# Patient Record
Sex: FEMALE | Race: WHITE | NOT HISPANIC OR LATINO | Employment: FULL TIME | URBAN - METROPOLITAN AREA
[De-identification: names, ages, dates, MRNs, and addresses within clinical notes are randomized per-mention and may not be internally consistent; named-entity substitution may affect disease eponyms.]

---

## 2017-03-24 ENCOUNTER — OFFICE VISIT (OUTPATIENT)
Dept: SLEEP MEDICINE | Facility: CLINIC | Age: 42
End: 2017-03-24
Attending: INTERNAL MEDICINE
Payer: COMMERCIAL

## 2017-03-24 VITALS
HEART RATE: 76 BPM | HEIGHT: 64 IN | DIASTOLIC BLOOD PRESSURE: 89 MMHG | BODY MASS INDEX: 28.85 KG/M2 | WEIGHT: 169 LBS | RESPIRATION RATE: 16 BRPM | SYSTOLIC BLOOD PRESSURE: 130 MMHG | OXYGEN SATURATION: 98 %

## 2017-03-24 DIAGNOSIS — J31.0 OTHER RHINITIS: ICD-10-CM

## 2017-03-24 DIAGNOSIS — F32.89 OTHER DEPRESSION: ICD-10-CM

## 2017-03-24 DIAGNOSIS — G47.50 PARASOMNIA: Primary | ICD-10-CM

## 2017-03-24 DIAGNOSIS — R06.83 SNORING: ICD-10-CM

## 2017-03-24 NOTE — PATIENT INSTRUCTIONS
actigraphy  3-4 wks after actigraphy or with sleep diaries  Your BMI is Body mass index is 29.01 kg/(m^2).  Weight management is a personal decision.  If you are interested in exploring weight loss strategies, the following discussion covers the approaches that may be successful. Body mass index (BMI) is one way to tell whether you are at a healthy weight, overweight, or obese. It measures your weight in relation to your height.  A BMI of 18.5 to 24.9 is in the healthy range. A person with a BMI of 25 to 29.9 is considered overweight, and someone with a BMI of 30 or greater is considered obese. More than two-thirds of American adults are considered overweight or obese.  Being overweight or obese increases the risk for further weight gain. Excess weight may lead to heart disease and diabetes.  Creating and following plans for healthy eating and physical activity may help you improve your health.  Weight control is part of healthy lifestyle and includes exercise, emotional health, and healthy eating habits. Careful eating habits lifelong are the mainstay of weight control. Though there are significant health benefits from weight loss, long-term weight loss with diet alone may be very difficult to achieve- studies show long-term success with dietary management in less than 10% of people. Attaining a healthy weight may be especially difficult to achieve in those with severe obesity. In some cases, medications, devices and surgical management might be considered.  What can you do?  If you are overweight or obese and are interested in methods for weight loss, you should discuss this with your provider.     Consider reducing daily calorie intake by 500 calories.     Keep a food journal.     Avoiding skipping meals, consider cutting portions instead.    Diet combined with exercise helps maintain muscle while optimizing fat loss. Strength training is particularly important for building and maintaining muscle mass. Exercise  helps reduce stress, increase energy, and improves fitness. Increasing exercise without diet control, however, may not burn enough calories to loose weight.       Start walking three days a week 10-20 minutes at a time    Work towards walking thirty minutes five days a week     Eventually, increase the speed of your walking for 1-2 minutes at time    In addition, we recommend that you review healthy lifestyles and methods for weight loss available through the National Institutes of Health patient information sites:  http://win.niddk.nih.gov/publications/index.htm    And look into health and wellness programs that may be available through your health insurance provider, employer, local community center, or gifty club.    Weight management plan: Patient was referred to their PCP to discuss a diet and exercise plan.

## 2017-03-24 NOTE — PROGRESS NOTES
Allergies:    Allergies   Allergen Reactions     Phenergan Vc [Promethazine Vc] Visual Disturbance       Medications:    Current Outpatient Prescriptions   Medication Sig Dispense Refill     rizatriptan (MAXALT-MLT) 5 MG disintegrating tablet Take 1-2 tablets (5-10 mg) by mouth at onset of headache for migraine May repeat dose in 2 hours.  Do not exceed 30 mg in 24 hours 10 tablet 1     norgestimate-ethinyl estradiol (ORTHO-CYCLEN, SPRINTEC) 0.25-35 MG-MCG per tablet Take 1 tablet by mouth daily 84 tablet 3     norethindrone (MICRONOR) 0.35 MG per tablet Take 1 tablet (0.35 mg) by mouth daily 84 tablet 3     ketoconazole (NIZORAL) 2 % shampoo Apply topically to scalp, lather, leave on for 3-5 minutes, then rinse.  Use ever other day 120 mL 12     loratadine (CLARITIN) 10 MG tablet Take 10 mg by mouth daily       Fluvoxamine Maleate 100 MG CP24 Take 200 mg by mouth daily.       amphetamine-dextroamphetamine (ADDERALL) 30 MG tablet Take 30 mg by mouth daily.       buPROPion (WELLBUTRIN XL) 150 MG 24 hr tablet Take 150 mg by mouth every morning.       CALCIUM PO Take  by mouth.       Multiple Vitamins-Minerals (MULTIPLE VITAMINS/WOMENS PO) Take  by mouth.       COD LIVER OIL PO Take  by mouth.       Probiotic Product (PROBIOTIC PO) Take  by mouth.         Problem List:  Patient Active Problem List    Diagnosis Date Noted     Hair loss 01/08/2015     Priority: Medium     Contraception -- abstinence 09/04/2013     Priority: Medium     Nexplanon info given.       Acne 01/08/2015     Cystic. RX spironolactone 1/8/15: start 25 mg x 7day, then 50 mg daily       Plantar fascial fibromatosis 11/10/2014     Diaphoresis 09/04/2013     Unknown cause.       Depression 02/08/2012     Controlled on meds followed by Psych  Problem list name updated by automated process. Provider to review and confirm       OCD (obsessive compulsive disorder) 02/08/2012     Controlled on meds w Psychiatrist Tanisha Becker       IBS (irritable bowel  "syndrome) 02/08/2012     Diarrhea-type       Migraines 02/08/2012     Exacerbated with menses. Currently 1-2 per month, lasting 3-4 days. Does have aura.       Hyperlipidemia 02/08/2012        Past Medical/Surgical History:  Past Medical History:   Diagnosis Date     Abnormal Pap smear 2006?    dysplasia  X 1; paps OK since then...     Arm DVT (deep venous thromboembolism), acute (H) 2008    hx with phlebitis in IVs after a surgery     Cholesterol serum elevated     runs in family     Complication of anesthesia 2000?, 2016    very slow to awake from both gen. anesth. & conscious sedati     Depression      Encounter for insertion of mirena IUD 2011    D/C'd w increased acne     IBS (irritable bowel syndrome) 2002    under control, better with probiotic     Menarche age 15    cycles q 1-2 months Xs 3-4 d w bad cramps     Migraines 2008    a lot better now, may be stress related     OCD (obsessive compulsive disorder)      Seasonal allergies      Past Surgical History:   Procedure Laterality Date     HEAD & NECK SURGERY  1990?, 2016    wisdom teeth extracted, gum tissue grafting/oral surgery     LASIK Bilateral 2008     MAMMOPLASTY REDUCTION BILATERAL  1197           Physical Examination:  Vitals: /89 (BP Location: Right arm, Patient Position: Supine, Cuff Size: Adult Regular)  Pulse 76  Resp 16  Ht 1.626 m (5' 4\")  Wt 76.7 kg (169 lb)  SpO2 98%  BMI 29.01 kg/m2  BMI= Body mass index is 29.01 kg/(m^2).         [place] Total Score 3/24/2017   Total score - [place] 12       GENERAL APPEARANCE: healthy, alert and mild distress  EYES: Eyes grossly normal to inspection  HENT: oropharynx crowded, soft palate dependent and redundant tissue, nares mildly congested  NECK: thyroid normal to palpation  RESP: lungs clear to auscultation - no rales, rhonchi or wheezes  CV: regular rates and rhythm, normal S1 S2, no S3 or S4 and no murmur, click or rub  Musculoskeletal:  Mallampati Class: III.  Tonsillar Stage: 1  hidden " by pillars.  Dental: Dentition in good condition.  Good advancement of lower jaw without tmd  Skin: without facial rash        CC: Jazzy Llamas

## 2017-03-24 NOTE — MR AVS SNAPSHOT
After Visit Summary   3/24/2017    Mireille Walls    MRN: [id number]           Patient Information     Date Of Birth          1975        Visit Information        Provider Department      3/24/2017 11:00 AM Brenna Avelar APRN [facility], [place], Sleep Study        Today's Diagnoses     Parasomnia    -  1    Chronic fatigue          Care Instructions      Your BMI is Body mass index is 29.01 kg/(m^2).  Weight management is a personal decision.  If you are interested in exploring weight loss strategies, the following discussion covers the approaches that may be successful. Body mass index (BMI) is one way to tell whether you are at a healthy weight, overweight, or obese. It measures your weight in relation to your height.  A BMI of 18.5 to 24.9 is in the healthy range. A person with a BMI of 25 to 29.9 is considered overweight, and someone with a BMI of 30 or greater is considered obese. More than two-thirds of American adults are considered overweight or obese.  Being overweight or obese increases the risk for further weight gain. Excess weight may lead to heart disease and diabetes.  Creating and following plans for healthy eating and physical activity may help you improve your health.  Weight control is part of healthy lifestyle and includes exercise, emotional health, and healthy eating habits. Careful eating habits lifelong are the mainstay of weight control. Though there are significant health benefits from weight loss, long-term weight loss with diet alone may be very difficult to achieve- studies show long-term success with dietary management in less than 10% of people. Attaining a healthy weight may be especially difficult to achieve in those with severe obesity. In some cases, medications, devices and surgical management might be considered.  What can you do?  If you are overweight or obese and are interested in methods for weight loss, you should discuss this with your provider.      Consider reducing daily calorie intake by 500 calories.     Keep a food journal.     Avoiding skipping meals, consider cutting portions instead.    Diet combined with exercise helps maintain muscle while optimizing fat loss. Strength training is particularly important for building and maintaining muscle mass. Exercise helps reduce stress, increase energy, and improves fitness. Increasing exercise without diet control, however, may not burn enough calories to loose weight.       Start walking three days a week 10-20 minutes at a time    Work towards walking thirty minutes five days a week     Eventually, increase the speed of your walking for 1-2 minutes at time    In addition, we recommend that you review healthy lifestyles and methods for weight loss available through the National Institutes of Health patient information sites:  http://win.niddk.nih.gov/publications/index.htm    And look into health and wellness programs that may be available through your health insurance provider, employer, local community center, or gifty club.    Weight management plan: Patient was referred to their PCP to discuss a diet and exercise plan.          Follow-ups after your visit        Follow-up notes from your care team     Return for actigraphy-1st available anywhere.      Future tests that were ordered for you today     Open Future Orders        Priority Expected Expires Ordered    Comprehensive Sleep Study Routine  9/20/2017 3/24/2017            Who to contact     If you have questions or need follow up information about today's clinic visit or your schedule [facility] directly at [phone].  Normal or non-critical lab and imaging results will be communicated to you by MyChart, letter or phone within 4 business days after the clinic has received the results. If you do not hear from us within 7 days, please contact the clinic through MyChart or phone. If you have a critical or abnormal lab  "result, we will notify you by phone as soon as possible.  Submit refill requests through [facility] or call your pharmacy and they will forward the refill request to us. Please allow 3 business days for your refill to be completed.          Additional Information About Your Visit        [facility] and [facility] Information     [facility] gives you secure access to your electronic health record. If you see a primary care provider, you can also send messages to your care team and make appointments. If you have questions, please call your primary care clinic.  If you do not have a primary care provider, please call [phone] and they will assist you.        Care EveryWhere ID     This is your Care EveryWhere ID. This could be used by other organizations to access your [facility] medical records  [id number]        Your Vitals Were     Pulse Respirations Height Pulse Oximetry BMI (Body Mass Index)       76 16 1.626 m (5' 4\") 98% 29.01 kg/m2        Blood Pressure from Last 3 Encounters:   03/24/17 130/89   09/16/16 124/86   09/09/16 (!) 131/91    Weight from Last 3 Encounters:   03/24/17 76.7 kg (169 lb)   09/16/16 73.4 kg (161 lb 14.4 oz)   09/09/16 73.9 kg (162 lb 14.4 oz)              We Performed the Following     SLEEP EVALUATION & MANAGEMENT REFERRAL - ADULT        Primary Care Provider    No Ref-Primary Verified       No address on file        Thank you!     Thank you for choosing [facility]  for your care. Our goal is always to provide you with excellent care. Hearing back from our patients is one way we can continue to improve our services. Please take a few minutes to complete the written survey that you may receive in the mail after your visit with us. Thank you!             Your Updated Medication List - Protect others around you: Learn how to safely use, store and throw away your medicines at www.disposemymeds.org.          This list is accurate as of: 3/24/17 12:26 PM.  Always use your most recent med list.    "                Brand Name Dispense Instructions for use    ADDERALL 30 MG per tablet   Generic drug:  amphetamine-dextroamphetamine      Take 30 mg by mouth daily.       CALCIUM PO      Take  by mouth.       CLARITIN 10 MG tablet   Generic drug:  loratadine      Take 10 mg by mouth daily       COD LIVER OIL PO      Take  by mouth.       fluvoxaMINE Maleate 100 MG 24 hr capsule    LUVOX CR     Take 200 mg by mouth daily.       ketoconazole 2 % shampoo    NIZORAL    120 mL    Apply topically to scalp, lather, leave on for 3-5 minutes, then rinse.  Use ever other day       MULTIPLE VITAMINS/WOMENS PO      Take  by mouth.       norethindrone 0.35 MG per tablet    MICRONOR    84 tablet    Take 1 tablet (0.35 mg) by mouth daily       norgestimate-ethinyl estradiol 0.25-35 MG-MCG per tablet    ORTHO-CYCLEN, SPRINTEC    84 tablet    Take 1 tablet by mouth daily       PROBIOTIC PO      Take  by mouth.       rizatriptan 5 MG ODT tab    MAXALT-MLT    10 tablet    Take 1-2 tablets (5-10 mg) by mouth at onset of headache for migraine May repeat dose in 2 hours.  Do not exceed 30 mg in 24 hours       WELLBUTRIN  MG 24 hr tablet   Generic drug:  buPROPion      Take 150 mg by mouth every morning.

## 2017-03-24 NOTE — NURSING NOTE
"Chief Complaint   Patient presents with     Consult     Discuss chronic fatigue, talking and yelling in sleep       Initial Ht 1.626 m (5' 4\")  Wt 76.7 kg (169 lb)  BMI 29.01 kg/m2 Estimated body mass index is 29.01 kg/(m^2) as calculated from the following:    Height as of this encounter: 1.626 m (5' 4\").    Weight as of this encounter: 76.7 kg (169 lb).  Medication Reconciliation: complete       Neck circumference: 14 inches.  35 cm      SHAE Styles        "

## 2017-03-25 NOTE — PROGRESS NOTES
DATE OF VISIT:  03/24/2017       CHIEF COMPLAINT:  Dr. Llamas has requested Ms. Mireille Walls to be seen in consultation for difficulties with sleep.      HISTORY OF PRESENT ILLNESS:  The patient reports continuing persistent fatigue associated with talking and yelling in her sleep.  She had a previous sleep study done at [facility] approximately 15 years ago without recommendations, was diagnosed with idiopathic hypersomnia.  Currently some reports from relatives that there may be some light snoring that occurs.  It is rare for her to wake up with a snort arousal.  Also reports a dry throat and trouble breathing through her nose.  There have been no witnessed apneas.  When she does have arousals from sleep she notices that she has been on her back.  She also sleeps on her sides.  No signs of orexin deficiency.  Behaviors at night can occur 1-2 times a week and they include talking, screaming, cussing, motioning, flailing about it.  If she has gone to bed at 1:00, 2:00 or 3:00 in the morning, they can occur somewhere between 3:00 and 5:00 a.m.  She sometimes will awaken after having heard perhaps a loud scream.  Otherwise, unless she is sleeping at her mother's, she is not aware of the frequency of these events.  She feels there has been no intentional re-creation of activities during a dream.  She flails.  She has kicked the wall and if she flails and connects with something, that may cause a wakeup. She is difficult to arouse when sleeping at her mother's after a screaming or flailing event.      She has been working for 6 years doing the p.m. shift, starting work, I believe somewhere around 3:00 and working until 11:00 or 12:00, then getting home and enters bed somewhere between 1:00 a.m. or 2:30 in the morning.  If she works the next day she exits bed at 11:00 a.m.  If she does not work her sleep schedule shows bedtime somewhere around 9:00 p.m. and she will have a wakeup around 11:00 a.m., take  her medications, go back to sleep and stay in bed until around 2:00 p.m.  Her staying asleep and spending a lot of time in bed is related to her depression and her OCD diagnosis and she is between providers for these concerns.  She will feel as if she just has not gotten enough sleep or quality of sleep.      Sleep latency 20-30 minutes.  One bathroom break.  It takes 5 minutes to fall back asleep with a wakeup.  On work nights she may get 10 hours, on weekends or days off ,17-22 hours.  There is some uncertainty as to how long she has been asleep.  Four to 5 days a week she may take what she considers to be a nap.  She may take a brief nap before she goes into work or on days off, her naps may be 3-5 hours, that is included in her total sleep time previously mentioned.  No activities in bed such as TV or screens.      SOCIAL AND PERSONAL HISTORY:  She is single.  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      SLEEP ENVIRONMENT:  Conducive to good sleep.  She denies any noise that may be triggers.        Family members have been diagnosed with ALIVIA; however, her brother is obese.  No use of alcohol, marijuana or over-the-counter sleep aids.  Does have some minimal caffeine.      [place] Sleepiness score today is 12/24 without difficulties with driving.  Sleepiness does affect her work, has had problems with attendance, she has used FMLA and currently her FMLA is not available to her any longer.  Thirty out of 30 days, problems with OCD and depression.  She is encouraged to get a new provider.  At this point, she feels provider is in the throes of jail.  Thirty out of 30 days tired and fatigued.        REVIEW OF SYSTEMS:  A 14-point comprehensive review of systems was completed and negative with the exception of the following:  She has allergies to dust, it is year around.  Takes Claritin.  Does also get some congestion if her Claritin has not  been taken where her eyes start running.        MENTAL HEALTH:  Depression, anxiety and OCD.      Allergies:    Allergies   Allergen Reactions     Phenergan Vc [Promethazine Vc] Visual Disturbance       Medications:    Current Outpatient Prescriptions   Medication Sig Dispense Refill     rizatriptan (MAXALT-MLT) 5 MG disintegrating tablet Take 1-2 tablets (5-10 mg) by mouth at onset of headache for migraine May repeat dose in 2 hours.  Do not exceed 30 mg in 24 hours 10 tablet 1     norgestimate-ethinyl estradiol (ORTHO-CYCLEN, SPRINTEC) 0.25-35 MG-MCG per tablet Take 1 tablet by mouth daily 84 tablet 3     norethindrone (MICRONOR) 0.35 MG per tablet Take 1 tablet (0.35 mg) by mouth daily 84 tablet 3     ketoconazole (NIZORAL) 2 % shampoo Apply topically to scalp, lather, leave on for 3-5 minutes, then rinse.  Use ever other day 120 mL 12     loratadine (CLARITIN) 10 MG tablet Take 10 mg by mouth daily       Fluvoxamine Maleate 100 MG CP24 Take 200 mg by mouth daily.       amphetamine-dextroamphetamine (ADDERALL) 30 MG tablet Take 30 mg by mouth daily.       buPROPion (WELLBUTRIN XL) 150 MG 24 hr tablet Take 150 mg by mouth every morning.       CALCIUM PO Take  by mouth.       Multiple Vitamins-Minerals (MULTIPLE VITAMINS/WOMENS PO) Take  by mouth.       COD LIVER OIL PO Take  by mouth.       Probiotic Product (PROBIOTIC PO) Take  by mouth.         Problem List:  Patient Active Problem List    Diagnosis Date Noted     Hair loss 01/08/2015     Priority: Medium     Contraception -- abstinence 09/04/2013     Priority: Medium     Nexplanon info given.       Acne 01/08/2015     Cystic. RX spironolactone 1/8/15: start 25 mg x 7day, then 50 mg daily       Plantar fascial fibromatosis 11/10/2014     Diaphoresis 09/04/2013     Unknown cause.       Depression 02/08/2012     Controlled on meds followed by Psych  Problem list name updated by automated process. Provider to review and confirm       OCD (obsessive compulsive  "disorder) 02/08/2012     Controlled on meds w Psychiatrist Tanisha Becker       IBS (irritable bowel syndrome) 02/08/2012     Diarrhea-type       Migraines 02/08/2012     Exacerbated with menses. Currently 1-2 per month, lasting 3-4 days. Does have aura.       Hyperlipidemia 02/08/2012        Past Medical/Surgical History:  Past Medical History:   Diagnosis Date     Abnormal Pap smear 2006?    dysplasia  X 1; paps OK since then...     Arm DVT (deep venous thromboembolism), acute (H) 2008    hx with phlebitis in IVs after a surgery     Cholesterol serum elevated     runs in family     Complication of anesthesia 2000?, 2016    very slow to awake from both gen. anesth. & conscious sedati     Depression      Encounter for insertion of mirena IUD 2011    D/C'd w increased acne     IBS (irritable bowel syndrome) 2002    under control, better with probiotic     Menarche age 15    cycles q 1-2 months Xs 3-4 d w bad cramps     Migraines 2008    a lot better now, may be stress related     OCD (obsessive compulsive disorder)      Seasonal allergies      Past Surgical History:   Procedure Laterality Date     HEAD & NECK SURGERY  1990?, 2016    wisdom teeth extracted, gum tissue grafting/oral surgery     LASIK Bilateral 2008     MAMMOPLASTY REDUCTION BILATERAL  1197           Physical Examination:  Vitals: /89 (BP Location: Right arm, Patient Position: Supine, Cuff Size: Adult Regular)  Pulse 76  Resp 16  Ht 1.626 m (5' 4\")  Wt 76.7 kg (169 lb)  SpO2 98%  BMI 29.01 kg/m2  BMI= Body mass index is 29.01 kg/(m^2).         [place] Total Score 3/24/2017   Total score - [place] 12       GENERAL APPEARANCE: healthy, alert and mild distress  EYES: Eyes grossly normal to inspection  HENT: oropharynx crowded, soft palate dependent and redundant tissue, nares mildly congested  NECK: thyroid normal to palpation  RESP: lungs clear to auscultation - no rales, rhonchi or wheezes  CV: regular rates and rhythm, normal S1 S2, no S3 " or S4 and no murmur, click or rub  Musculoskeletal:  Mallampati Class: III.  Tonsillar Stage: 1  hidden by pillars.  Dental: Dentition in good condition.  Good advancement of lower jaw without tmd  Skin: without facial rash    ASSESSMENT AND PLAN:  Ms. De La Pazs STOP-Bang score is likely, at her age, is 2 giving her no to an exceptionally low probability of obstructive sleep apnea; however, her sleep is disturbed by arousals of which she does not usually come to full consciousness unless she has hurt herself by thrashing and hitting her surroundings and sometimes there may be a vague dream of protecting relatives.  They occur in the early part of the evening and of interest the start of these behaviors (with our review today) an awareness that they have come upon her as she shifted her work from daytime to working evenings and sleeping into the daytime hours.  When coupled with her depression and anxiety and feeling that she cannot get enough sleep, she is spending most of her time in bed.The following plan of care has been developed:     1.  Parasomnias.  They do sound to be more of a night terror than confusional arousals with no evidence of leaving her bed with minimal signs of injury and nothing displaced in her home.  Will use actigraphy or sleep logs to identify patterns and frequency of events. She is willing to drive to any center to  actigraphy. To that end, we also discussed the possibility of going back to working day shift and she seems interested in that and will make some inquiry as to what that may look like considering the fact that she fully enjoys her role in her work.  She has MyChart and will get in touch with me if she is able to be provided actigraphy and we will keep in touch as she pursues gathering information about changing to day shift. Followup will be based on actigraphy availability.  Our plan is being seen again roughly in 3-4 weeks.   2.  Snoring and sometimes with daytime  congestion, I encouraged her to continue with her Claritin and treat some rhinitis.   3  Rhinitis.  I have demonstrated the use of Flonase.     4.  Depression with obsessive compulsive disorder.  She needs to be seen regularly so that we can add degree of activity to her daytime function when she is not working.      Thank you for allowing me to participate in this kind woman's care.  I will see her roughly in 1 month's time, sooner if needed.  We will pursue actigraphy; however, with the association of her change in shifts of sleep, it does sound like this is a case of misalignment of her circadian rhythm resulting in parasomnias.  Time spent with patient 60 minutes, of which greater than 50% was spent in counseling, education and coordination of care.         MINNIE PAVON, CNP             D: 2017 08:45   T: 2017 12:28   MT: sujatha      Name:     XI LYLES   MRN:      [id number]        Account:      [id number]   :      1975           Visit Date:   2017      Document: [id number]       cc: Jazzy Llamas MD

## 2017-04-11 ENCOUNTER — TELEPHONE (OUTPATIENT)
Dept: SLEEP MEDICINE | Facility: CLINIC | Age: 42
End: 2017-04-11

## 2017-04-11 NOTE — TELEPHONE ENCOUNTER
Record information rev'd from [facility] [place] stating that there are no records on pt's previous sleep study.  Sleep study done 10 years ago.    SHAE Styles

## 2017-04-26 DIAGNOSIS — G43.009 MIGRAINE WITHOUT AURA AND WITHOUT STATUS MIGRAINOSUS, NOT INTRACTABLE: ICD-10-CM

## 2017-04-26 RX ORDER — RIZATRIPTAN BENZOATE 5 MG/1
5-10 TABLET, ORALLY DISINTEGRATING ORAL
Qty: 10 TABLET | Refills: 1 | Status: SHIPPED | OUTPATIENT
Start: 2017-04-26 | End: 2017-12-04

## 2017-07-06 DIAGNOSIS — Z30.41 SURVEILLANCE OF PREVIOUSLY PRESCRIBED CONTRACEPTIVE PILL: Primary | ICD-10-CM

## 2017-07-06 RX ORDER — NORGESTIMATE AND ETHINYL ESTRADIOL 0.25-0.035
1 KIT ORAL DAILY
Qty: 30 TABLET | Refills: 0 | Status: SHIPPED | OUTPATIENT
Start: 2017-07-06 | End: 2017-07-28

## 2017-07-06 NOTE — TELEPHONE ENCOUNTER
Received refill request for OCP.  Last in clinic 6/2016.    Tried to reach Mireille but received personal voicemail.  Left message that one month refill can be sent to pharmacy but annual exam is due with midwives. Please call to schedule.

## 2017-07-13 ENCOUNTER — TELEPHONE (OUTPATIENT)
Dept: OBGYN | Facility: CLINIC | Age: 42
End: 2017-07-13

## 2017-07-13 DIAGNOSIS — Z12.31 ENCOUNTER FOR SCREENING MAMMOGRAM FOR BREAST CANCER: Primary | ICD-10-CM

## 2017-07-13 NOTE — TELEPHONE ENCOUNTER
Spoke with Mireille who is having mammogram tomorrow. In the past she has had regular mammograms but due to history of breast reduction, scar tissue shows up and then they need to do the 3D mammogram. So this year she scheduled the 3D one right away. She checked with her insurance and they state that it will be covered if there is a standing order for the 3D mammogram from her MD. Spoke with Dr. Tilley and she agreed to order. Will pend to her to sign.

## 2017-07-19 ENCOUNTER — TELEPHONE (OUTPATIENT)
Dept: SLEEP MEDICINE | Facility: CLINIC | Age: 42
End: 2017-07-19

## 2017-07-20 ENCOUNTER — TELEPHONE (OUTPATIENT)
Dept: SLEEP MEDICINE | Facility: CLINIC | Age: 42
End: 2017-07-20

## 2017-07-21 ENCOUNTER — RADIANT APPOINTMENT (OUTPATIENT)
Dept: MAMMOGRAPHY | Facility: CLINIC | Age: 42
End: 2017-07-21

## 2017-07-21 DIAGNOSIS — Z12.31 ENCOUNTER FOR SCREENING MAMMOGRAM FOR BREAST CANCER: ICD-10-CM

## 2017-07-27 ASSESSMENT — ENCOUNTER SYMPTOMS
LOSS OF CONSCIOUSNESS: 0
DIZZINESS: 1
DECREASED LIBIDO: 0
INSOMNIA: 0
DISTURBANCES IN COORDINATION: 0
HEADACHES: 1
DEPRESSION: 1
SEIZURES: 0
DECREASED CONCENTRATION: 0
NERVOUS/ANXIOUS: 1
MEMORY LOSS: 0
SPEECH CHANGE: 0
PARALYSIS: 0
TREMORS: 0
NUMBNESS: 0
WEAKNESS: 0
HOT FLASHES: 0
TINGLING: 0
PANIC: 0

## 2017-07-27 ASSESSMENT — ANXIETY QUESTIONNAIRES
7. FEELING AFRAID AS IF SOMETHING AWFUL MIGHT HAPPEN: NOT AT ALL
GAD7 TOTAL SCORE: 3
7. FEELING AFRAID AS IF SOMETHING AWFUL MIGHT HAPPEN: NOT AT ALL
4. TROUBLE RELAXING: SEVERAL DAYS
GAD7 TOTAL SCORE: 3
6. BECOMING EASILY ANNOYED OR IRRITABLE: NOT AT ALL
3. WORRYING TOO MUCH ABOUT DIFFERENT THINGS: NOT AT ALL
1. FEELING NERVOUS, ANXIOUS, OR ON EDGE: MORE THAN HALF THE DAYS
5. BEING SO RESTLESS THAT IT IS HARD TO SIT STILL: NOT AT ALL
2. NOT BEING ABLE TO STOP OR CONTROL WORRYING: NOT AT ALL
GAD7 TOTAL SCORE: 3

## 2017-07-27 ASSESSMENT — PATIENT HEALTH QUESTIONNAIRE - PHQ9
SUM OF ALL RESPONSES TO PHQ QUESTIONS 1-9: 11
10. IF YOU CHECKED OFF ANY PROBLEMS, HOW DIFFICULT HAVE THESE PROBLEMS MADE IT FOR YOU TO DO YOUR WORK, TAKE CARE OF THINGS AT HOME, OR GET ALONG WITH OTHER PEOPLE: EXTREMELY DIFFICULT
SUM OF ALL RESPONSES TO PHQ QUESTIONS 1-9: 11

## 2017-07-28 DIAGNOSIS — Z30.41 SURVEILLANCE OF PREVIOUSLY PRESCRIBED CONTRACEPTIVE PILL: ICD-10-CM

## 2017-07-28 RX ORDER — NORGESTIMATE AND ETHINYL ESTRADIOL 0.25-0.035
1 KIT ORAL DAILY
Qty: 30 TABLET | Refills: 0 | Status: SHIPPED | OUTPATIENT
Start: 2017-07-28 | End: 2017-08-08

## 2017-07-28 ASSESSMENT — PATIENT HEALTH QUESTIONNAIRE - PHQ9: SUM OF ALL RESPONSES TO PHQ QUESTIONS 1-9: 11

## 2017-07-28 ASSESSMENT — ANXIETY QUESTIONNAIRES: GAD7 TOTAL SCORE: 3

## 2017-08-08 ENCOUNTER — OFFICE VISIT (OUTPATIENT)
Dept: FAMILY MEDICINE | Facility: CLINIC | Age: 42
End: 2017-08-08
Attending: FAMILY MEDICINE
Payer: COMMERCIAL

## 2017-08-08 VITALS
DIASTOLIC BLOOD PRESSURE: 86 MMHG | HEIGHT: 64 IN | SYSTOLIC BLOOD PRESSURE: 138 MMHG | HEART RATE: 84 BPM | WEIGHT: 166 LBS | BODY MASS INDEX: 28.34 KG/M2

## 2017-08-08 DIAGNOSIS — F33.1 MAJOR DEPRESSIVE DISORDER, RECURRENT EPISODE, MODERATE (H): ICD-10-CM

## 2017-08-08 DIAGNOSIS — Z13.1 SCREENING FOR DIABETES MELLITUS: ICD-10-CM

## 2017-08-08 DIAGNOSIS — G47.10 HYPERSOMNIA: ICD-10-CM

## 2017-08-08 DIAGNOSIS — Z13.29 SCREENING FOR THYROID DISORDER: ICD-10-CM

## 2017-08-08 DIAGNOSIS — Z30.41 SURVEILLANCE OF PREVIOUSLY PRESCRIBED CONTRACEPTIVE PILL: ICD-10-CM

## 2017-08-08 DIAGNOSIS — Z00.00 ANNUAL PHYSICAL EXAM: Primary | ICD-10-CM

## 2017-08-08 DIAGNOSIS — E78.1 HYPERTRIGLYCERIDEMIA: ICD-10-CM

## 2017-08-08 PROBLEM — F41.8 ANXIETY ASSOCIATED WITH DEPRESSION: Status: ACTIVE | Noted: 2017-08-08

## 2017-08-08 PROCEDURE — 99213 OFFICE O/P EST LOW 20 MIN: CPT | Mod: ZF

## 2017-08-08 RX ORDER — NORGESTIMATE AND ETHINYL ESTRADIOL 0.25-0.035
1 KIT ORAL DAILY
Qty: 90 TABLET | Refills: 0 | Status: SHIPPED | OUTPATIENT
Start: 2017-08-08 | End: 2017-10-17

## 2017-08-08 ASSESSMENT — PAIN SCALES - GENERAL: PAINLEVEL: NO PAIN (0)

## 2017-08-08 NOTE — LETTER
2017       RE: Mireille Walls  [address] [place] [address]     Dear Colleague,    Thank you for referring your patient, Mireille Walls, to [facility] at [facility]. Please see a copy of my visit note below.    Mireille is a 42 year old female  that presents today for annual exam:   HPI:  Hypersomnia and difficulty with sleep:    Works evenings and gets home at midnight    Sleep late into the morning and no energy [In bed with cat is the best place].     Sleep specialist  Depression, longing, chronic/OCD    Luvox 200 mg     Wellbutrin 150     Adderall 30 mg daily   [medications from psychiatrist - Dr. Tanisha Becker]. Has a consult with [facility] in September.   HCM: mammogram/tomosynthesis: 2017; Pap/HPV 2016;    ROS:  General: long standing depression   Head/Eyes: none  Ears/Nose/Throat: none  Cardiovascular: none  Respiratory: none  Sexual Function: none  Musculoskeletal: body is sensitive   Skin: none  Neurological: none  Mental Health: none  Endocrine: none  PROBLEM LIST:  Patient Active Problem List   Diagnosis     Depression     OCD (obsessive compulsive disorder)     IBS (irritable bowel syndrome)     Migraines     Hyperlipidemia     Contraception -- abstinence     Diaphoresis     Plantar fascial fibromatosis     Acne     Hair loss   OB/GYN HISTORY:   Menses: OCP's continuously. Spotting off and on.   Obstetric History       T0      L0     SAB0   TAB0   Ectopic0   Multiple0   Live Births0       # Outcome Date GA Lbr Jeremiah/2nd Weight Sex Delivery Anes PTL Lv   1 AB               PAST MEDICAL HISTORY:  Past Medical History:   Diagnosis Date     Abnormal Pap smear ?    dysplasia  X 1; paps OK since then...     Arm DVT (deep venous thromboembolism), acute (H)     hx with phlebitis in IVs after a surgery     Cholesterol serum elevated     runs in family     Complication of anesthesia ?, 2016    very  slow to awake from both gen. anesth. & conscious sedati     Depression      Encounter for insertion of mirena IUD 2011    D/C'd w increased acne     IBS (irritable bowel syndrome) 2002    under control, better with probiotic     Menarche age 15    cycles q 1-2 months Xs 3-4 d w bad cramps     Migraines 2008    a lot better now, may be stress related     OCD (obsessive compulsive disorder)      Seasonal allergies    Life Style Modifiers:   Tobacco:  reports that she has never smoked. She has never used smokeless tobacco.   Alcohol:  reports that she does not drink alcohol.   Drug use:  reports that she does not use illicit drugs.  Exercise: limited because of fatigue. Very active at work.                    PAST SURGICAL HISTORY:  Past Surgical History:   Procedure Laterality Date     HEAD & NECK SURGERY  1990?, 2016    wisdom teeth extracted, gum tissue grafting/oral surgery     LASIK Bilateral 2008     MAMMOPLASTY REDUCTION BILATERAL  1197   FAMILY HISTORY:  Family History   Problem Relation Age of Onset     CANCER Father 57     bladder ca     Genitourinary Problems Father      Polycystic kidney     Cancer - colorectal Paternal Grandmother 75     MENTAL ILLNESS Other      depression, schizophrenia     CANCER Mother      skin cancer     Hyperlipidemia Mother      runs in Mom's "[employer].-fam. members meds high choleste     Alcohol/Drug Paternal Aunt      Xs 2     Thyroid Disease Maternal Grandmother      hypothyroid     Coronary Artery Disease Maternal Grandfather      heart attack     MENTAL ILLNESS Other      Schizophrenia     MENTAL ILLNESS Cousin      OCD     Depression Other      chronic and episodic     C.A.D. No family hx of      Hypertension No family hx of      Breast Cancer No family hx of    SOCIAL HISTORY:  Single. Loves animals.  Mother alive and lives in [place].   MEDICATIONS:  Current Outpatient Prescriptions   Medication Sig Dispense Refill     norgestimate-ethinyl estradiol (ORTHO-CYCLEN, SPRINTEC)  "0.25-35 MG-MCG per tablet Take 1 tablet by mouth daily 30 tablet 0     rizatriptan (MAXALT-MLT) 5 MG ODT tab Take 1-2 tablets (5-10 mg) by mouth at onset of headache for migraine May repeat dose in 2 hours.  Do not exceed 30 mg in 24 hours 10 tablet 1     norethindrone (MICRONOR) 0.35 MG per tablet Take 1 tablet (0.35 mg) by mouth daily 84 tablet 3     ketoconazole (NIZORAL) 2 % shampoo Apply topically to scalp, lather, leave on for 3-5 minutes, then rinse.  Use ever other day 120 mL 12     loratadine (CLARITIN) 10 MG tablet Take 10 mg by mouth daily       Fluvoxamine Maleate 100 MG CP24 Take 200 mg by mouth daily.       amphetamine-dextroamphetamine (ADDERALL) 30 MG tablet Take 30 mg by mouth daily.       buPROPion (WELLBUTRIN XL) 150 MG 24 hr tablet Take 150 mg by mouth every morning.       CALCIUM PO Take  by mouth.       Multiple Vitamins-Minerals (MULTIPLE VITAMINS/WOMENS PO) Take  by mouth.       COD LIVER OIL PO Take  by mouth.       Probiotic Product (PROBIOTIC PO) Take  by mouth.     ALLERGIES:  Phenergan vc [promethazine vc]  VITALS:  /86  Pulse 84  Ht 1.626 m (5' 4\")  Wt 75.3 kg (166 lb)  BMI 28.49 kg/m2  PHYSICAL EXAM:  Constitutional: Well appearing woman in no acute distress.   Psychological: appropriate mood.  Eyes: anicteric, normal extra-ocular movements,  pupils are equal and reactive to light.   Ears, Nose and Throat: tympanic membranes clear, nose clear and free of lesions, throat clear, moist mucous membrames, neck supple with full range of motion.    Neck: No thyroidmegaly. No jugular venous distension, no carotid bruits.  Cardiovascular: regular rate and rhythm, normal S1 and S2, no murmurs, rubs or gallops, peripheral pulses full and symmetric   Respiratory: clear to auscultation, no wheezes or crackles, normal breath sounds.  Breast: Symmetrical without visible distortion or swelling. No masses noted. No nipple inversion, no breast dimpling or puckering. Axillary area without " masses or lympadenapathy.   Gastrointestinal: positive bowel sounds, nontender, no hepatosplenomegaly, no masses. No guarding or rebound.  Genitourinary: N/A   Musculoskeletal: full range of motion    Skin: no concerning lesions, no jaundice.  Neurological: normal gait, no tremor.   Diagnoses and associated orders for this visit:  Annual physical exam  - Vitamin D every other day and Cod liver oil daily  - Mammogram up to date  - PAP/HPV completed 6/2016  Surveillance of previously prescribed contraceptive pill  -     norgestimate-ethinyl estradiol (ORTHO-CYCLEN, SPRINTEC) 0.25-35 MG-MCG per tablet; Take 1 tablet by mouth daily Continuously.  Hypersomnia  -  Follow with sleep clinic  Major depressive disorder, recurrent episode, moderate (H)  -  Seeing psychiatry   Hypertriglyceridemia  -     Lipid Panel; Future  Screening for diabetes mellitus  -     Basic Metabolic Panel; Future  Screening for thyroid disorder  -     TSH; Future  -     T3 Free; Future  -     T4 free; Future    Again, thank you for allowing me to participate in the care of your patient.      Sincerely,    Krupa Tilley MD

## 2017-08-08 NOTE — PROGRESS NOTES
Mireille is a 42 year old female  that presents today for annual exam:   HPI:  Hypersomnia and difficulty with sleep:    Works evenings and gets home at midnight    Sleep late into the morning and no energy [In bed with cat is the best place].     Sleep specialist  Depression, longing, chronic/OCD    Luvox 200 mg     Wellbutrin 150     Adderall 30 mg daily   [medications from psychiatrist - Dr. Tanisha Becker]. Has a consult with [facility] in September.   HCM: mammogram/tomosynthesis: 2017; Pap/HPV 2016;    ROS:  General: long standing depression   Head/Eyes: none  Ears/Nose/Throat: none  Cardiovascular: none  Respiratory: none  Sexual Function: none  Musculoskeletal: body is sensitive   Skin: none  Neurological: none  Mental Health: none  Endocrine: none  PROBLEM LIST:  Patient Active Problem List   Diagnosis     Depression     OCD (obsessive compulsive disorder)     IBS (irritable bowel syndrome)     Migraines     Hyperlipidemia     Contraception -- abstinence     Diaphoresis     Plantar fascial fibromatosis     Acne     Hair loss   OB/GYN HISTORY:   Menses: OCP's continuously. Spotting off and on.   Obstetric History       T0      L0     SAB0   TAB0   Ectopic0   Multiple0   Live Births0       # Outcome Date GA Lbr Jeremiah/2nd Weight Sex Delivery Anes PTL Lv   1 AB 1998              PAST MEDICAL HISTORY:  Past Medical History:   Diagnosis Date     Abnormal Pap smear ?    dysplasia  X 1; paps OK since then...     Arm DVT (deep venous thromboembolism), acute (H)     hx with phlebitis in IVs after a surgery     Cholesterol serum elevated     runs in family     Complication of anesthesia ?, 2016    very slow to awake from both gen. anesth. & conscious sedati     Depression      Encounter for insertion of mirena IUD     D/C'd w increased acne     IBS (irritable bowel syndrome)     under control, better with probiotic     Menarche age 15    cycles q 1-2 months Xs 3-4 d w bad cramps      Migraines 2008    a lot better now, may be stress related     OCD (obsessive compulsive disorder)      Seasonal allergies    Life Style Modifiers:   Tobacco:  reports that she has never smoked. She has never used smokeless tobacco.   Alcohol:  reports that she does not drink alcohol.   Drug use:  reports that she does not use illicit drugs.  Exercise: limited because of fatigue. Very active at work.                    PAST SURGICAL HISTORY:  Past Surgical History:   Procedure Laterality Date     HEAD & NECK SURGERY  1990?, 2016    wisdom teeth extracted, gum tissue grafting/oral surgery     LASIK Bilateral 2008     MAMMOPLASTY REDUCTION BILATERAL  1197   FAMILY HISTORY:  Family History   Problem Relation Age of Onset     CANCER Father 57     bladder ca     Genitourinary Problems Father      Polycystic kidney     Cancer - colorectal Paternal Grandmother 75     MENTAL ILLNESS Other      depression, schizophrenia     CANCER Mother      skin cancer     Hyperlipidemia Mother      runs in Mom's fam.-fam. members meds high choleste     Alcohol/Drug Paternal Aunt      Xs 2     Thyroid Disease Maternal Grandmother      hypothyroid     Coronary Artery Disease Maternal Grandfather      heart attack     MENTAL ILLNESS Other      Schizophrenia     MENTAL ILLNESS Cousin      OCD     Depression Other      chronic and episodic     C.A.D. No family hx of      Hypertension No family hx of      Breast Cancer No family hx of    SOCIAL HISTORY:  Single. Loves animals.  Mother alive and lives in [place].   MEDICATIONS:  Current Outpatient Prescriptions   Medication Sig Dispense Refill     norgestimate-ethinyl estradiol (ORTHO-CYCLEN, SPRINTEC) 0.25-35 MG-MCG per tablet Take 1 tablet by mouth daily 30 tablet 0     rizatriptan (MAXALT-MLT) 5 MG ODT tab Take 1-2 tablets (5-10 mg) by mouth at onset of headache for migraine May repeat dose in 2 hours.  Do not exceed 30 mg in 24 hours 10 tablet 1     norethindrone (MICRONOR) 0.35 MG per  "tablet Take 1 tablet (0.35 mg) by mouth daily 84 tablet 3     ketoconazole (NIZORAL) 2 % shampoo Apply topically to scalp, lather, leave on for 3-5 minutes, then rinse.  Use ever other day 120 mL 12     loratadine (CLARITIN) 10 MG tablet Take 10 mg by mouth daily       Fluvoxamine Maleate 100 MG CP24 Take 200 mg by mouth daily.       amphetamine-dextroamphetamine (ADDERALL) 30 MG tablet Take 30 mg by mouth daily.       buPROPion (WELLBUTRIN XL) 150 MG 24 hr tablet Take 150 mg by mouth every morning.       CALCIUM PO Take  by mouth.       Multiple Vitamins-Minerals (MULTIPLE VITAMINS/WOMENS PO) Take  by mouth.       COD LIVER OIL PO Take  by mouth.       Probiotic Product (PROBIOTIC PO) Take  by mouth.     ALLERGIES:  Phenergan vc [promethazine vc]  VITALS:  /86  Pulse 84  Ht 1.626 m (5' 4\")  Wt 75.3 kg (166 lb)  BMI 28.49 kg/m2  PHYSICAL EXAM:  Constitutional: Well appearing woman in no acute distress.   Psychological: appropriate mood.  Eyes: anicteric, normal extra-ocular movements,  pupils are equal and reactive to light.   Ears, Nose and Throat: tympanic membranes clear, nose clear and free of lesions, throat clear, moist mucous membrames, neck supple with full range of motion.    Neck: No thyroidmegaly. No jugular venous distension, no carotid bruits.  Cardiovascular: regular rate and rhythm, normal S1 and S2, no murmurs, rubs or gallops, peripheral pulses full and symmetric   Respiratory: clear to auscultation, no wheezes or crackles, normal breath sounds.  Breast: Symmetrical without visible distortion or swelling. No masses noted. No nipple inversion, no breast dimpling or puckering. Axillary area without masses or lympadenapathy.   Gastrointestinal: positive bowel sounds, nontender, no hepatosplenomegaly, no masses. No guarding or rebound.  Genitourinary: N/A   Musculoskeletal: full range of motion    Skin: no concerning lesions, no jaundice.  Neurological: normal gait, no tremor.   Diagnoses and " associated orders for this visit:  Annual physical exam  - Vitamin D every other day and Cod liver oil daily  - Mammogram up to date  - PAP/HPV completed 6/2016  Surveillance of previously prescribed contraceptive pill  -     norgestimate-ethinyl estradiol (ORTHO-CYCLEN, SPRINTEC) 0.25-35 MG-MCG per tablet; Take 1 tablet by mouth daily Continuously.  Hypersomnia  -  Follow with sleep clinic  Major depressive disorder, recurrent episode, moderate (H)  -  Seeing psychiatry   Hypertriglyceridemia  -     Lipid Panel; Future  Screening for diabetes mellitus  -     Basic Metabolic Panel; Future  Screening for thyroid disorder  -     TSH; Future  -     T3 Free; Future  -     T4 free; Future

## 2017-08-08 NOTE — MR AVS SNAPSHOT
After Visit Summary   8/8/2017    Mireille Walls    MRN: [id number]           Patient Information     Date Of Birth          1975        Visit Information        Provider Department      8/8/2017 12:40 PM Krupa Tilley MD [facility]        Today's Diagnoses     Annual physical exam    -  1    Surveillance of previously prescribed contraceptive pill        Hypersomnia        Major depressive disorder, recurrent episode, moderate (H)        Hypertriglyceridemia        Screening for diabetes mellitus        Screening for thyroid disorder           Follow-ups after your visit        Future tests that were ordered for you today     Open Future Orders        Priority Expected Expires Ordered    TSH Routine 8/9/2017 12/8/2017 8/8/2017    T3 Free Routine 8/9/2017 12/8/2017 8/8/2017    T4 free Routine 8/9/2017 12/8/2017 8/8/2017    Basic Metabolic Panel Routine 8/9/2017 12/8/2017 8/8/2017    Lipid Panel Routine 8/9/2017 12/8/2017 8/8/2017            Who to contact     Please call your clinic at [phone] to:    Ask questions about your health    Make or cancel appointments    Discuss your medicines    Learn about your test results    Speak to your doctor   If you have compliments or concerns about an experience at your clinic, or if you wish to file a complaint, please contact [facility] Patient Relations at [phone] or email us at [email or web address]         Additional Information About Your Visit        MyChart Information     [employer] gives you secure access to your electronic health record. If you see a primary care provider, you can also send messages to your care team and make appointments. If you have questions, please call your primary care clinic.  If you do not have a primary care provider, please call [phone] and they will assist you.      [employer] is an electronic gateway that provides easy, online access to your  "medical records. With [employer], you can request a clinic appointment, read your test results, renew a prescription or communicate with your care team.     To access your existing account, please contact your [facility] or call [phone] for assistance.        Care EveryWhere ID     This is your Care EveryWhere ID. This could be used by other organizations to access your [facility] medical records  [id number]        Your Vitals Were     Pulse Height BMI (Body Mass Index)             84 1.626 m (5' 4\") 28.49 kg/m2          Blood Pressure from Last 3 Encounters:   08/08/17 138/86   03/24/17 130/89   09/16/16 124/86    Weight from Last 3 Encounters:   08/08/17 75.3 kg (166 lb)   03/24/17 76.7 kg (169 lb)   09/16/16 73.4 kg (161 lb 14.4 oz)                 Today's Medication Changes          These changes are accurate as of: 8/8/17  2:35 PM.  If you have any questions, ask your nurse or doctor.               These medicines have changed or have updated prescriptions.        Dose/Directions    norgestimate-ethinyl estradiol 0.25-35 MG-MCG per tablet   Commonly known as:  ORTHO-CYCLEN, SPRINTEC   This may have changed:  additional instructions   Used for:  Surveillance of previously prescribed contraceptive pill   Changed by:  Krupa Tilley MD        Dose:  1 tablet   Take 1 tablet by mouth daily Continuously.   Quantity:  90 tablet   Refills:  0            Where to get your medicines      These medications were sent to [facility] [place], [place] - [address]  [address] [address]     Phone:  [phone]     norgestimate-ethinyl estradiol 0.25-35 MG-MCG per tablet                Primary Care Provider    No Ref-Primary Verified       No address on file        Equal Access to Services     ZULEMA PADILLA AH: Reynaldo Guzman, rivera ramirez, devika villa, karen finley. So [facility] " [phone].    ATENCIÓN: Si mariam bland, tiene a flores disposición servicios gratuitos de asistencia lingüística. Concetta julio [phone].    We comply with applicable federal civil rights laws and [place] laws. We do not discriminate on the basis of race, color, national origin, age, disability sex, sexual orientation or gender identity.            Thank you!     Thank you for choosing [facility]  for your care. Our goal is always to provide you with excellent care. Hearing back from our patients is one way we can continue to improve our services. Please take a few minutes to complete the written survey that you may receive in the mail after your visit with us. Thank you!             Your Updated Medication List - Protect others around you: Learn how to safely use, store and throw away your medicines at www.disposemymeds.org.          This list is accurate as of: 8/8/17  2:35 PM.  Always use your most recent med list.                   Brand Name Dispense Instructions for use Diagnosis    ADDERALL 30 MG per tablet   Generic drug:  amphetamine-dextroamphetamine      Take 30 mg by mouth daily.        CALCIUM PO      Take  by mouth.        CLARITIN 10 MG tablet   Generic drug:  loratadine      Take 10 mg by mouth daily        COD LIVER OIL PO      Take  by mouth.        fluvoxaMINE Maleate 100 MG 24 hr capsule    LUVOX CR     Take 200 mg by mouth daily.        ketoconazole 2 % shampoo    NIZORAL    120 mL    Apply topically to scalp, lather, leave on for 3-5 minutes, then rinse.  Use ever other day    Dermatitis, seborrheic       MULTIPLE VITAMINS/WOMENS PO      Take  by mouth.        norgestimate-ethinyl estradiol 0.25-35 MG-MCG per tablet    ORTHO-CYCLEN, SPRINTEC    90 tablet    Take 1 tablet by mouth daily Continuously.    Surveillance of previously prescribed contraceptive pill       PROBIOTIC PO      Take  by mouth.        rizatriptan 5 MG ODT tab    MAXALT-MLT    10 tablet    Take 1-2  tablets (5-10 mg) by mouth at onset of headache for migraine May repeat dose in 2 hours.  Do not exceed 30 mg in 24 hours    Migraine without aura and without status migrainosus, not intractable       WELLBUTRIN  MG 24 hr tablet   Generic drug:  buPROPion      Take 150 mg by mouth every morning.

## 2017-08-22 DIAGNOSIS — Z13.29 SCREENING FOR THYROID DISORDER: ICD-10-CM

## 2017-08-22 DIAGNOSIS — E78.1 HYPERTRIGLYCERIDEMIA: ICD-10-CM

## 2017-08-22 DIAGNOSIS — Z13.1 SCREENING FOR DIABETES MELLITUS: ICD-10-CM

## 2017-08-22 LAB
ANION GAP SERPL CALCULATED.3IONS-SCNC: 11 MMOL/L (ref 3–14)
BUN SERPL-MCNC: 13 MG/DL (ref 7–30)
CALCIUM SERPL-MCNC: 8.3 MG/DL (ref 8.5–10.1)
CHLORIDE SERPL-SCNC: 109 MMOL/L (ref 94–109)
CHOLEST SERPL-MCNC: 213 MG/DL
CO2 SERPL-SCNC: 24 MMOL/L (ref 20–32)
CREAT SERPL-MCNC: 0.66 MG/DL (ref 0.52–1.04)
GFR SERPL CREATININE-BSD FRML MDRD: >90 ML/MIN/1.7M2
GLUCOSE SERPL-MCNC: 88 MG/DL (ref 70–99)
HDLC SERPL-MCNC: 79 MG/DL
LDLC SERPL CALC-MCNC: 99 MG/DL
NONHDLC SERPL-MCNC: 134 MG/DL
POTASSIUM SERPL-SCNC: 4.2 MMOL/L (ref 3.4–5.3)
SODIUM SERPL-SCNC: 144 MMOL/L (ref 133–144)
T3FREE SERPL-MCNC: 2.6 PG/ML (ref 2.3–4.2)
T4 FREE SERPL-MCNC: 0.89 NG/DL (ref 0.76–1.46)
TRIGL SERPL-MCNC: 176 MG/DL
TSH SERPL DL<=0.005 MIU/L-ACNC: 2.36 MU/L (ref 0.4–4)

## 2017-08-22 PROCEDURE — 84439 ASSAY OF FREE THYROXINE: CPT | Performed by: FAMILY MEDICINE

## 2017-08-22 PROCEDURE — 80048 BASIC METABOLIC PNL TOTAL CA: CPT | Performed by: FAMILY MEDICINE

## 2017-08-22 PROCEDURE — 80061 LIPID PANEL: CPT | Performed by: FAMILY MEDICINE

## 2017-08-22 PROCEDURE — 36415 COLL VENOUS BLD VENIPUNCTURE: CPT | Performed by: FAMILY MEDICINE

## 2017-08-22 PROCEDURE — 84443 ASSAY THYROID STIM HORMONE: CPT | Performed by: FAMILY MEDICINE

## 2017-08-22 PROCEDURE — 84481 FREE ASSAY (FT-3): CPT | Performed by: FAMILY MEDICINE

## 2017-10-17 DIAGNOSIS — Z30.41 SURVEILLANCE OF PREVIOUSLY PRESCRIBED CONTRACEPTIVE PILL: ICD-10-CM

## 2017-10-17 RX ORDER — NORGESTIMATE AND ETHINYL ESTRADIOL 0.25-0.035
1 KIT ORAL DAILY
Qty: 90 TABLET | Refills: 3 | Status: SHIPPED | OUTPATIENT
Start: 2017-10-17 | End: 2018-07-10

## 2017-10-17 NOTE — TELEPHONE ENCOUNTER
Received refill request for OCP.  Last in clinic 8/2017 and PAP and HPV up to date. Refill sent per protocol.

## 2017-12-04 DIAGNOSIS — G43.009 MIGRAINE WITHOUT AURA AND WITHOUT STATUS MIGRAINOSUS, NOT INTRACTABLE: ICD-10-CM

## 2017-12-04 RX ORDER — RIZATRIPTAN BENZOATE 5 MG/1
5-10 TABLET, ORALLY DISINTEGRATING ORAL
Qty: 10 TABLET | Refills: 1 | Status: SHIPPED | OUTPATIENT
Start: 2017-12-04 | End: 2018-06-12

## 2017-12-04 NOTE — TELEPHONE ENCOUNTER
Received refill request for maxalt.  Last in clinic 8/2017 but this med not discussed. Prescribed previously by Dr. Kennedy in 9/2016.

## 2018-06-12 DIAGNOSIS — G43.009 MIGRAINE WITHOUT AURA AND WITHOUT STATUS MIGRAINOSUS, NOT INTRACTABLE: ICD-10-CM

## 2018-06-12 RX ORDER — RIZATRIPTAN BENZOATE 5 MG/1
5-10 TABLET, ORALLY DISINTEGRATING ORAL
Qty: 10 TABLET | Refills: 1 | Status: SHIPPED | OUTPATIENT
Start: 2018-06-12 | End: 2018-12-31

## 2018-06-18 ENCOUNTER — TELEPHONE (OUTPATIENT)
Dept: OBGYN | Facility: CLINIC | Age: 43
End: 2018-06-18

## 2018-06-18 NOTE — TELEPHONE ENCOUNTER
----- Message from Aarti Elias sent at 6/15/2018  1:27 PM CDT -----  Regarding: New Onset spotting on pill  Contact: [phone]  Takes 'dysmenorrhea' meds for a while now; last 2-3 months spotting every day.  Is that normal?

## 2018-06-18 NOTE — TELEPHONE ENCOUNTER
Mireille calling because she takes her OCP daily for painful periods . She has been taking it continuously for about 2 yrs. She doesn't stop every 3 months for bleed.She has had occasional times of spotting until the last two months in which she now has daily spotting.     Discussed with Dr Solis in clinic and recommended her first try to have a withdrawal bleed and if that doesn't help, call back for US with OB/GYN provider appt after.     Above Instructions given.Pt indicated understanding and agreed with plan.

## 2018-06-18 NOTE — TELEPHONE ENCOUNTER
Pt called and left message on triage voicemail re: spotting daily on OCP. Returned patient call but reached voicemail -- left message on patient voicemail to call triage to discuss symptoms

## 2018-07-10 DIAGNOSIS — Z30.41 SURVEILLANCE OF PREVIOUSLY PRESCRIBED CONTRACEPTIVE PILL: ICD-10-CM

## 2018-07-10 RX ORDER — NORGESTIMATE AND ETHINYL ESTRADIOL 0.25-0.035
1 KIT ORAL DAILY
Qty: 28 TABLET | Refills: 0 | Status: SHIPPED | OUTPATIENT
Start: 2018-07-10 | End: 2018-08-02

## 2018-07-10 NOTE — TELEPHONE ENCOUNTER
Received refill request for OCP.  Last in clinic 8/2017.    Tried to reach Mireille but received voicemail.  Left message that refill request was received and a one month supply can be sent to pharmacy but office visit is due for further refills. Please call [phone] to schedule.

## 2018-08-01 ENCOUNTER — RADIANT APPOINTMENT (OUTPATIENT)
Dept: MAMMOGRAPHY | Facility: CLINIC | Age: 43
End: 2018-08-01
Attending: FAMILY MEDICINE

## 2018-08-01 DIAGNOSIS — Z12.31 VISIT FOR SCREENING MAMMOGRAM: ICD-10-CM

## 2018-08-02 ENCOUNTER — TELEPHONE (OUTPATIENT)
Dept: OBGYN | Facility: CLINIC | Age: 43
End: 2018-08-02

## 2018-08-02 DIAGNOSIS — Z30.41 SURVEILLANCE OF PREVIOUSLY PRESCRIBED CONTRACEPTIVE PILL: ICD-10-CM

## 2018-08-02 RX ORDER — NORGESTIMATE AND ETHINYL ESTRADIOL 0.25-0.035
1 KIT ORAL DAILY
Qty: 84 TABLET | Refills: 0 | Status: SHIPPED | OUTPATIENT
Start: 2018-08-02 | End: 2018-09-24

## 2018-08-02 NOTE — TELEPHONE ENCOUNTER
Patient cancelled appt. Today but we still received PHQ9 which came back with elevated responses. Nurse left message for patient to call back nurse line and discuss rescheduling appt. With a provider ASAP. Has seen Dr. Tilley in the past.

## 2018-08-02 NOTE — TELEPHONE ENCOUNTER
Spoke with Mireille about her PHQ9 she is thankful for the concern but assures that she has had depression for a long time and she is under the care of a psychiatrist. She would like to reschedule her annual. Will need a few more refills of ocp until she can be seen. Rx sent.

## 2018-08-03 ENCOUNTER — TELEPHONE (OUTPATIENT)
Dept: BEHAVIORAL HEALTH | Facility: CLINIC | Age: 43
End: 2018-08-03

## 2018-08-03 NOTE — TELEPHONE ENCOUNTER
This writer reviewed the file and learned the RN contacted the patient and the patient was able to state her safety and her plan to address her long standing depression-in addition looks like she rescheduled the missed appointment.

## 2018-08-26 ENCOUNTER — TELEPHONE (OUTPATIENT)
Dept: URGENT CARE | Facility: URGENT CARE | Age: 43
End: 2018-08-26

## 2018-08-26 ENCOUNTER — NURSE TRIAGE (OUTPATIENT)
Dept: NURSING | Facility: CLINIC | Age: 43
End: 2018-08-26

## 2018-08-26 ENCOUNTER — OFFICE VISIT (OUTPATIENT)
Dept: URGENT CARE | Facility: URGENT CARE | Age: 43
End: 2018-08-26
Payer: COMMERCIAL

## 2018-08-26 VITALS
SYSTOLIC BLOOD PRESSURE: 136 MMHG | DIASTOLIC BLOOD PRESSURE: 88 MMHG | WEIGHT: 169.5 LBS | OXYGEN SATURATION: 97 % | TEMPERATURE: 98.9 F | HEART RATE: 74 BPM | BODY MASS INDEX: 29.09 KG/M2

## 2018-08-26 DIAGNOSIS — M54.9 ACUTE MID BACK PAIN: Primary | ICD-10-CM

## 2018-08-26 DIAGNOSIS — M54.9 ACUTE MID BACK PAIN: ICD-10-CM

## 2018-08-26 PROCEDURE — 99214 OFFICE O/P EST MOD 30 MIN: CPT | Performed by: PHYSICIAN ASSISTANT

## 2018-08-26 RX ORDER — NAPROXEN 500 MG/1
500 TABLET ORAL 2 TIMES DAILY PRN
Qty: 30 TABLET | Refills: 1 | Status: SHIPPED | OUTPATIENT
Start: 2018-08-26 | End: 2018-08-26

## 2018-08-26 RX ORDER — NAPROXEN 500 MG/1
500 TABLET ORAL 2 TIMES DAILY PRN
Qty: 30 TABLET | Refills: 1 | Status: SHIPPED | OUTPATIENT
Start: 2018-08-26 | End: 2018-12-04

## 2018-08-26 RX ORDER — METAXALONE 800 MG/1
800 TABLET ORAL 3 TIMES DAILY PRN
Qty: 30 TABLET | Refills: 1 | Status: SHIPPED | OUTPATIENT
Start: 2018-08-26 | End: 2018-08-26

## 2018-08-26 RX ORDER — METAXALONE 800 MG/1
800 TABLET ORAL 3 TIMES DAILY PRN
Qty: 30 TABLET | Refills: 1 | Status: SHIPPED | OUTPATIENT
Start: 2018-08-26 | End: 2018-12-04

## 2018-08-26 NOTE — TELEPHONE ENCOUNTER
Seen at [facility] today. 3 Rx sent to [facility] but when pt arrived the pharmacy had already closed. Wants to get at [facility], [facility]. Rxs for metaxalone, naproxen and diclofenac gel sent to [facility] per orders in EHR. Rabia Treviño RN/FNA

## 2018-08-26 NOTE — PROGRESS NOTES
SUBJECTIVE  HPI: Mireille Walsl is a 43 year old female who presents for evaluation of back pain  Symptoms began 11 day(s) ago, have been onset acute and are worse over the past 3 days .  Pain is located in the middle of back right region, with radiation to does not radiate, and are at worst a 8 on a scale of 1-10.  Recent injury: swivel hip move on a trampline  Personal hx of back pain is no prior back problems.  Pain is exacerbated by: sneezing, lifting, walking, lying, sitting, bending and changing position.  Pain is relieved by: OTC NSAIDs[unfilled] sx include: denies.  Red flag symptoms: negative.    Past Medical History:   Diagnosis Date     Abnormal Pap smear 2006?    dysplasia  X 1; paps OK since then...     Arm DVT (deep venous thromboembolism), acute (H) 2008    hx with phlebitis in IVs after a surgery     Cholesterol serum elevated     runs in family     Complication of anesthesia 2000?, 2016    very slow to awake from both gen. anesth. & conscious sedati     Depression      Encounter for insertion of mirena IUD 2011    D/C'd w increased acne     IBS (irritable bowel syndrome) 2002    under control, better with probiotic     Menarche age 15    cycles q 1-2 months Xs 3-4 d w bad cramps     Migraines 2008    a lot better now, may be stress related     OCD (obsessive compulsive disorder)      Seasonal allergies      Current Outpatient Prescriptions   Medication Sig Dispense Refill     vortioxetine (TRINTELLIX) 10 MG tablet Take 1 tablet (10 mg) by mouth daily       buPROPion (WELLBUTRIN XL) 150 MG 24 hr tablet Take 150 mg by mouth every morning.       CALCIUM PO Take  by mouth.       COD LIVER OIL PO Take  by mouth.       Fluvoxamine Maleate 100 MG CP24 Take 200 mg by mouth daily.       ketoconazole (NIZORAL) 2 % shampoo Apply topically to scalp, lather, leave on for 3-5 minutes, then rinse.  Use ever other day 120 mL 12     loratadine (CLARITIN) 10 MG tablet Take 10 mg by mouth daily       Multiple  Vitamins-Minerals (MULTIPLE VITAMINS/WOMENS PO) Take  by mouth.       norgestimate-ethinyl estradiol (ORTHO-CYCLEN, SPRINTEC) 0.25-35 MG-MCG per tablet Take 1 tablet by mouth daily Continuously. 84 tablet 0     Probiotic Product (PROBIOTIC PO) Take  by mouth.       rizatriptan (MAXALT-MLT) 5 MG ODT tab Take 1-2 tablets (5-10 mg) by mouth at onset of headache for migraine May repeat dose in 2 hours.  Do not exceed 30 mg in 24 hours 10 tablet 1     Social History   Substance Use Topics     Smoking status: Never Smoker     Smokeless tobacco: Never Used     Alcohol use No       ROS:  CONSTITUTIONAL:NEGATIVE for fever, chills, change in weight  MUSCULOSKELETAL: back pain    OBJECTIVE:  /88 (BP Location: Left arm, Cuff Size: Adult Regular)  Pulse 74  Temp 98.9  F (37.2  C) (Oral)  Wt 169 lb 8 oz (76.9 kg)  SpO2 97%  BMI 29.09 kg/m2  Back examination: positive findings: tenderness to palpation right mid back. FROM with moderate tenderness flexion, extension, lateral bend and rotation.   [unfilled] leg raise test: not done  GENERAL APPEARANCE: healthy, alert and no distress    ASSESSMENT/IMPRESSION:      1. Acute mid back pain    - naproxen (NAPROSYN) 500 MG tablet; Take 1 tablet (500 mg) by mouth 2 times daily as needed for moderate pain  Dispense: 30 tablet; Refill: 1  - metaxalone (SKELAXIN) 800 MG tablet; Take 1 tablet (800 mg) by mouth 3 times daily as needed for moderate pain  Dispense: 30 tablet; Refill: 1  - diclofenac (VOLTAREN) 1 % GEL topical gel; Apply 4 grams to back 3x daily  Dispense: 100 g; Refill: 1  - PHYSICAL THERAPY REFERRAL    PLAN:1) PLEASE SEE ORDER SUMMARY  [unfilled]:      1.  Continue stretching and strengthening exercises.       2.  Continue prn heat or ice application. As above. She will make PT appointment.

## 2018-08-26 NOTE — TELEPHONE ENCOUNTER
"Needed 3 Rxs from today at  to be sent to different pharmacy. [facility] closed. Sent to [facility]. See 8/26 ELHAM Treviño RN/FNA    Additional Information    Negative: Drug overdose and nurse unable to answer question    Negative: Caller requesting information not related to medicine    Negative: Caller requesting a prescription for Strep throat and has a positive culture result    Negative: Rash while taking a medication or within 3 days of stopping it    Negative: Immunization reaction suspected    Negative: [1] Asthma and [2] having symptoms of asthma (cough, wheezing, etc)    Negative: MORE THAN A DOUBLE DOSE of a prescription or over-the-counter (OTC) drug    Negative: [1] DOUBLE DOSE (an extra dose or lesser amount) of over-the-counter (OTC) drug AND [2] any symptoms (e.g., dizziness, nausea, pain, sleepiness)    Negative: [1] DOUBLE DOSE (an extra dose or lesser amount) of prescription drug AND [2] any symptoms (e.g., dizziness, nausea, pain, sleepiness)    Negative: Took another person's prescription drug    Negative: [1] DOUBLE DOSE (an extra dose or lesser amount) of prescription drug AND [2] NO symptoms (Exception: a double dose of antibiotics)    Negative: Diabetes drug error or overdose (e.g., insulin or extra dose)    Negative: [1] Request for URGENT new prescription or refill of \"essential\" medication (i.e., likelihood of harm to patient if not taken) AND [2] triager unable to fill per unit policy    Negative: [1] Prescription not at pharmacy AND [2] was prescribed today by PCP    Negative: Pharmacy calling with prescription questions and triager unable to answer question    Negative: Caller has URGENT medication question about med that PCP prescribed and triager unable to answer question    Negative: Caller has NON-URGENT medication question about med that PCP prescribed and triager unable to answer question    Negative: Caller requesting a NON-URGENT new prescription or refill and triager " unable to refill per unit policy    Negative: Caller has medication question about med not prescribed by PCP and triager unable to answer question (e.g., compatibility with other med, storage)    Negative: [1] DOUBLE DOSE (an extra dose or lesser amount) of over-the-counter (OTC) drug AND [2] NO symptoms (all triage questions negative)    Negative: [1] DOUBLE DOSE (an extra dose or lesser amount) of antibiotic drug AND [2] NO symptoms (all triage questions negative)    Negative: Caller has medication question only, adult not sick, and triager answers question    Caller has medication question, adult has minor symptoms, caller declines triage, and triager answers question    Protocols used: MEDICATION QUESTION CALL-ADULT-

## 2018-08-26 NOTE — MR AVS SNAPSHOT
"              After Visit Summary   8/26/2018    Mireille Walls    MRN: [id number]           Patient Information     Date Of Birth          1975        Visit Information        Provider Department      8/26/2018 6:00 PM Pb Guadarrama PA-C [facility]        Today's Diagnoses     Acute mid back pain    -  1       Follow-ups after your visit        Additional Services     PHYSICAL THERAPY REFERRAL       *This therapy referral will be filtered to a centralized scheduling office at [facility] and the patient will receive a call to schedule an appointment at a [place] location most convenient for them. *     [facility] provides Physical Therapy evaluation and treatment and many specialty services across the [place] system.  If requesting a specialty program, please choose from the list below.    If you have not heard from the scheduling office within 2 business days, please call [phone] for all locations, with the exception of [place], please call [phone] and [facility], please call [phone]  Treatment: Evaluation & Treatment  Special Instructions/Modalities:   Special Programs: None    Please be aware that coverage of these services is subject to the terms and limitations of your health insurance plan.  Call member services at your health plan with any benefit or coverage questions.      **Note to Provider:  If you are referring outside of [place] for the therapy appointment, please list the name of the location in the \"special instructions\" above, print the referral and give to the patient to schedule the appointment.                  Who to contact     If you have questions or need follow up information about today's clinic visit or your schedule please contact [facility] directly at [phone].  Normal or non-critical lab and imaging results will be communicated to you by MyChart, letter or phone within " 4 business days after the clinic has received the results. If you do not hear from us within 7 days, please contact the clinic through [facility] or phone. If you have a critical or abnormal lab result, we will notify you by phone as soon as possible.  Submit refill requests through Edgar or call your pharmacy and they will forward the refill request to us. Please allow 3 business days for your refill to be completed.          Additional Information About Your Visit        [facility] Information     Edgar gives you secure access to your electronic health record. If you see a primary care provider, you can also send messages to your care team and make appointments. If you have questions, please call your primary care clinic.  If you do not have a primary care provider, please call [phone] and they will assist you.        Care EveryWhere ID     This is your Care EveryWhere ID. This could be used by other organizations to access your [facility] medical records  [id number]        Your Vitals Were     Pulse Temperature Pulse Oximetry BMI (Body Mass Index)          74 98.9  F (37.2  C) (Oral) 97% 29.09 kg/m2         Blood Pressure from Last 3 Encounters:   08/26/18 136/88   08/08/17 138/86   03/24/17 130/89    Weight from Last 3 Encounters:   08/26/18 169 lb 8 oz (76.9 kg)   08/08/17 166 lb (75.3 kg)   03/24/17 169 lb (76.7 kg)              We Performed the Following     PHYSICAL THERAPY REFERRAL          Today's Medication Changes          These changes are accurate as of 8/26/18  6:28 PM.  If you have any questions, ask your nurse or doctor.               Start taking these medicines.        Dose/Directions    diclofenac 1 % Gel topical gel   Commonly known as:  VOLTAREN   Used for:  Acute mid back pain   Started by:  Pb Guadarrama PA-C        Apply 4 grams to back 3x daily   Quantity:  100 g   Refills:  1       metaxalone 800 MG tablet   Commonly known as:  SKELAXIN   Used for:  Acute mid back pain   Started by:   Pb Guadarrama PA-C        Dose:  800 mg   Take 1 tablet (800 mg) by mouth 3 times daily as needed for moderate pain   Quantity:  30 tablet   Refills:  1       naproxen 500 MG tablet   Commonly known as:  NAPROSYN   Used for:  Acute mid back pain   Started by:  Pb Guadarrama PA-C        Dose:  500 mg   Take 1 tablet (500 mg) by mouth 2 times daily as needed for moderate pain   Quantity:  30 tablet   Refills:  1            Where to get your medicines      These medications were sent to [id number] - [place], [place] - [address] AT [facility] & [address]  [address], [place] [address]     Phone:  [phone]     diclofenac 1 % Gel topical gel    metaxalone 800 MG tablet    naproxen 500 MG tablet                Primary Care Provider Fax #    Physician No Ref-Primary [phone]       No address on file        Equal Access to Services     [facility] : Reynaldo Guzman, waaxda luradha, qaybta kaalmada allen, karen preciado . So [facility] [phone].    ATENCIÓN: Si habla español, tiene a flores disposición servicios gratuitos de asistencia lingüística. Concetta al [phone].    We comply with applicable federal civil rights laws and [place] laws. We do not discriminate on the basis of race, color, national origin, age, disability, sex, sexual orientation, or gender identity.            Thank you!     Thank you for choosing [facility]  for your care. Our goal is always to provide you with excellent care. Hearing back from our patients is one way we can continue to improve our services. Please take a few minutes to complete the written survey that you may receive in the mail after your visit with us. Thank you!             Your Updated Medication List - Protect others around you: Learn how to safely use, store and throw away your medicines at www.disposemymeds.org.          This list is accurate as of 8/26/18  6:28 PM.  Always  use your most recent med list.                   Brand Name Dispense Instructions for use Diagnosis    CALCIUM PO      Take  by mouth.        CLARITIN 10 MG tablet   Generic drug:  loratadine      Take 10 mg by mouth daily        COD LIVER OIL PO      Take  by mouth.        diclofenac 1 % Gel topical gel    VOLTAREN    100 g    Apply 4 grams to back 3x daily    Acute mid back pain       fluvoxaMINE Maleate 100 MG 24 hr capsule    LUVOX CR     Take 200 mg by mouth daily.        ketoconazole 2 % shampoo    NIZORAL    120 mL    Apply topically to scalp, lather, leave on for 3-5 minutes, then rinse.  Use ever other day    Dermatitis, seborrheic       metaxalone 800 MG tablet    SKELAXIN    30 tablet    Take 1 tablet (800 mg) by mouth 3 times daily as needed for moderate pain    Acute mid back pain       MULTIPLE VITAMINS/WOMENS PO      Take  by mouth.        naproxen 500 MG tablet    NAPROSYN    30 tablet    Take 1 tablet (500 mg) by mouth 2 times daily as needed for moderate pain    Acute mid back pain       norgestimate-ethinyl estradiol 0.25-35 MG-MCG per tablet    ORTHO-CYCLEN, SPRINTEC    84 tablet    Take 1 tablet by mouth daily Continuously.    Surveillance of previously prescribed contraceptive pill       PROBIOTIC PO      Take  by mouth.        rizatriptan 5 MG ODT tab    MAXALT-MLT    10 tablet    Take 1-2 tablets (5-10 mg) by mouth at onset of headache for migraine May repeat dose in 2 hours.  Do not exceed 30 mg in 24 hours    Migraine without aura and without status migrainosus, not intractable       TRINTELLIX 10 MG tablet   Generic drug:  vortioxetine      Take 1 tablet (10 mg) by mouth daily        WELLBUTRIN  MG 24 hr tablet   Generic drug:  buPROPion      Take 150 mg by mouth every morning.

## 2018-08-28 ENCOUNTER — THERAPY VISIT (OUTPATIENT)
Dept: PHYSICAL THERAPY | Facility: CLINIC | Age: 43
End: 2018-08-28
Attending: PHYSICIAN ASSISTANT
Payer: COMMERCIAL

## 2018-08-28 DIAGNOSIS — M54.50 RIGHT-SIDED LOW BACK PAIN WITHOUT SCIATICA: Primary | ICD-10-CM

## 2018-08-28 PROBLEM — M54.6 MIDLINE THORACIC BACK PAIN: Status: ACTIVE | Noted: 2018-08-28

## 2018-08-28 PROBLEM — M54.6 MIDLINE THORACIC BACK PAIN: Status: RESOLVED | Noted: 2018-08-28 | Resolved: 2018-08-28

## 2018-08-28 PROCEDURE — 97161 PT EVAL LOW COMPLEX 20 MIN: CPT | Mod: GP | Performed by: PHYSICAL THERAPIST

## 2018-08-28 PROCEDURE — 97110 THERAPEUTIC EXERCISES: CPT | Mod: GP | Performed by: PHYSICAL THERAPIST

## 2018-08-28 PROCEDURE — 97530 THERAPEUTIC ACTIVITIES: CPT | Mod: GP | Performed by: PHYSICAL THERAPIST

## 2018-08-28 NOTE — PROGRESS NOTES
"Physical Therapy Initial Evaluation  August 28, 2018     MD Instructions/Precautions/Restrictions: PT eval and treat.     Therapist Impression:   Mireille Walls presents with findings consistent with mechanical LBP, with related impairments limiting her ability to sit, bend, lie down/sleep. Skilled PT services are necessary in order to reduce impairments and improve independent function.     Subjective:   Date of Onset: 8/15/18  C/C: Mid/lower R back pain, denies radiation to buttocks or down leg. Had one episode of severe spasms a few days ago.    Quality of pain is dull and aching. Pains are described as intermittent in nature. Pain is worse: not dependent on time of day. Pain is rated 7/10.   History of symptoms: Pains began gradually as the result of insidious onset - possibly related to \"swivel hips\" maneuver at [employer]. Since onset, symptoms are same.  Worsened by: Being in bed for too long, sitting>standing, bending down especially if to the R, cough/sneeze, transitional movements rolling in bed, lying on either side.    Alleviated by: Lying on back, standing. Was given naproxen and muscle relaxant and seem to be helping.    General health as reported by patient: good  Pertinent medical/surgical history: Refer to health history in EMR. Imaging: none. Current occupational status: Nursing station technician. Patient's goals are: decrease pain. Return to MD:  PRN.     Objective:  LUMBAR EXAMINATION    Posture: Slightly increased lumbar lordosis. No lateral shift present.     Active ROM Limitation   Flexion Pain during movement near end of motion, no loss of motion   Extension Pain during middle of motion, no loss of motion    L R   Rotation     Sidebend     Sideglide Nil, NE Nil, NE   REIL: decreases pain, better after    Neurological testing (myotomes, sensation, reflexes, nerve tension) not indicated at this time.    Spine Education: Discussed with patient postural correction with instruction in use of " lumbar/towel roll and sitting posture when unsupported. Education provided regarding impact of posture and body mechanics on symptom production. Patient encouraged to monitor this correlation as part of overall self management. Also discussed goal of avoiding postures/activities/stretches that at this time as may exacerbate current symptoms. (Therapeutic Activities: 12min)    Assessment/Plan:    The patient is a 43 year old female with chief complaint of lumbar pain.    The patient has the following significant findings with corresponding treatment plan.  Diagnosis 1:  Mechanical LBP    Pain -  hot/cold therapy, US, electric stimulation, mechanical traction, manual therapy, splint/taping/bracing/orthotics, self management, education, directional preference exercise and home program  Decreased ROM/flexibility - manual therapy, therapeutic exercise and home program  Decreased joint mobility - manual therapy, therapeutic exercise and home program  Decreased strength - therapeutic exercise, therapeutic activities and home program  Impaired muscle performance - neuro re-education and home program  Decreased function - therapeutic activities and home program  Impaired posture - neuro re-education, therapeutic activities and home program  Instability -  Therapeutic Activity, Therapeutic Exercise, Neuromuscular Re-education, Splinting/Taping/Bracing/Orthotic, home program    Therapy Evaluation Codes:   1) History comprised of:   Personal factors that impact the plan of care:      Please refer to health history in EMR.    Comorbidity factors that impact the plan of care are:      Please refer to health history in EMR.     Medications impacting care: None.  2) Examination of Body Systems comprised of:   Body structures and functions that impact the plan of care:      Lumbar spine.   Activity limitations that impact the plan of care are:      Bending, Sitting, Squatting/kneeling, Sleeping and Laying down.   Clinical  presentation characteristics are:    Stable/Uncomplicated.  3) Presentation comprised of:   Presentation scored as Low complexity with uncomplicated characteristics..  4) Decision-Making    Low complexity using standardized patient assessment instrument and/or measureable assessment of functional outcome.  Cumulative Therapy Evaluation is: Low complexity.    Previous and current functional limitations:  (See Goal Flow Sheet for this information)    Short term and Long term goals: (See Goal Flow Sheet for this information)     Communication ability:  Patient appears to be able to clearly communicate and understand verbal and written communication and follow directions correctly.  Treatment Explanation - The following has been discussed with the patient: RX ordered/plan of care, anticipated outcomes, and possible risks and side effects.  This patient would benefit from PT intervention to resume normal activities.   Rehab potential is good.    Frequency:  1 X week, once daily  Duration:  for 4 weeks  Discharge Plan: Achieve all LTGs, be independent in home treatment program, and reach maximal therapeutic benefit.    Please refer to the daily flowsheet for treatment today, total treatment time and time spent performing 1:1 timed codes.

## 2018-08-28 NOTE — MR AVS SNAPSHOT
After Visit Summary   8/28/2018    Mireille Walls    MRN: [id number]           Patient Information     Date Of Birth          1975        Visit Information        Provider Department      8/28/2018 12:50 PM Jovanni De Jesus PT [facility] [place]        Today's Diagnoses     Right-sided low back pain without sciatica    -  1       Follow-ups after your visit        Your next 10 appointments already scheduled     Sep 10, 2018 12:40 PM CDT   [facility] with Jovanni De Jesus PT   [facility] [place] ([address] [id number] [phone]              Who to contact     If you have questions or need follow up information about today's clinic visit or your schedule please contact [facility] directly at [phone].  Normal or non-critical lab and imaging results will be communicated to you by [facility], letter or phone within 4 business days after the clinic has received the results. If you do not hear from us within 7 days, please contact the clinic through [facility] or phone. If you have a critical or abnormal lab result, we will notify you by phone as soon as possible.  Submit refill requests through [employer] or call your pharmacy and they will forward the refill request to us. Please allow 3 business days for your refill to be completed.          Additional Information About Your Visit        MyChart Information     [employer] gives you secure access to your electronic health record. If you see a primary care provider, you can also send messages to your care team and make appointments. If you have questions, please call your primary care clinic.  If you do not have a primary care provider, please call [phone] and they will assist you.        Care EveryWhere ID     This is your Care EveryWhere ID. This could be used by other organizations to access your [facility]  records  [id number]         Blood Pressure from Last 3 Encounters:   08/26/18 136/88   08/08/17 138/86   03/24/17 130/89    Weight from Last 3 Encounters:   08/26/18 76.9 kg (169 lb 8 oz)   08/08/17 75.3 kg (166 lb)   03/24/17 76.7 kg (169 lb)              We Performed the Following     HC PT EVAL, LOW COMPLEXITY     [facility] INITIAL EVAL REPORT     THERAPEUTIC ACTIVITIES     THERAPEUTIC EXERCISES        Primary Care Provider Fax #    Physician No Ref-Primary [phone]       No address on file        Equal Access to Services     [facility] : Hadii phill Guzman, wagabino ramirez, devika villa, karen preciado . So [facility] [phone].    ATENCIÓN: Si habla español, tiene a flores disposición servicios gratuitos de asistencia lingüística. Ll[facility] [phone].    We comply with applicable federal civil rights laws and [place] laws. We do not discriminate on the basis of race, color, national origin, age, disability, sex, sexual orientation, or gender identity.            Thank you!     Thank you for choosing [facility]  for your care. Our goal is always to provide you with excellent care. Hearing back from our patients is one way we can continue to improve our services. Please take a few minutes to complete the written survey that you may receive in the mail after your visit with us. Thank you!             Your Updated Medication List - Protect others around you: Learn how to safely use, store and throw away your medicines at www.disposemymeds.org.          This list is accurate as of 8/28/18  1:24 PM.  Always use your most recent med list.                   Brand Name Dispense Instructions for use Diagnosis    CALCIUM PO      Take  by mouth.        CLARITIN 10 MG tablet   Generic drug:  loratadine      Take 10 mg by mouth daily        COD LIVER OIL PO      Take  by mouth.        diclofenac 1 % Gel topical gel    VOLTAREN    100 g    Apply  4 grams to back 3x daily    Acute mid back pain       fluvoxaMINE Maleate 100 MG 24 hr capsule    LUVOX CR     Take 200 mg by mouth daily.        ketoconazole 2 % shampoo    NIZORAL    120 mL    Apply topically to scalp, lather, leave on for 3-5 minutes, then rinse.  Use ever other day    Dermatitis, seborrheic       metaxalone 800 MG tablet    SKELAXIN    30 tablet    Take 1 tablet (800 mg) by mouth 3 times daily as needed for moderate pain    Acute mid back pain       MULTIPLE VITAMINS/WOMENS PO      Take  by mouth.        naproxen 500 MG tablet    NAPROSYN    30 tablet    Take 1 tablet (500 mg) by mouth 2 times daily as needed for moderate pain    Acute mid back pain       norgestimate-ethinyl estradiol 0.25-35 MG-MCG per tablet    ORTHO-CYCLEN, SPRINTEC    84 tablet    Take 1 tablet by mouth daily Continuously.    Surveillance of previously prescribed contraceptive pill       PROBIOTIC PO      Take  by mouth.        rizatriptan 5 MG ODT tab    MAXALT-MLT    10 tablet    Take 1-2 tablets (5-10 mg) by mouth at onset of headache for migraine May repeat dose in 2 hours.  Do not exceed 30 mg in 24 hours    Migraine without aura and without status migrainosus, not intractable       TRINTELLIX 10 MG tablet   Generic drug:  vortioxetine      Take 1 tablet (10 mg) by mouth daily        WELLBUTRIN  MG 24 hr tablet   Generic drug:  buPROPion      Take 150 mg by mouth every morning.

## 2018-09-24 DIAGNOSIS — Z30.41 SURVEILLANCE OF PREVIOUSLY PRESCRIBED CONTRACEPTIVE PILL: ICD-10-CM

## 2018-09-24 RX ORDER — NORGESTIMATE AND ETHINYL ESTRADIOL 0.25-0.035
1 KIT ORAL DAILY
Qty: 84 TABLET | Refills: 4 | OUTPATIENT
Start: 2018-09-24 | End: 2018-10-02

## 2018-09-24 NOTE — TELEPHONE ENCOUNTER
Received refill request for PRISCILLA.  Last in clinic 8/2017. HPV neg PAP NIL 2016 so due 2021, refill sent.

## 2018-10-02 ENCOUNTER — TELEPHONE (OUTPATIENT)
Dept: OBGYN | Facility: CLINIC | Age: 43
End: 2018-10-02

## 2018-10-02 DIAGNOSIS — Z30.41 SURVEILLANCE OF PREVIOUSLY PRESCRIBED CONTRACEPTIVE PILL: ICD-10-CM

## 2018-10-02 RX ORDER — NORGESTIMATE AND ETHINYL ESTRADIOL 0.25-0.035
1 KIT ORAL DAILY
Qty: 112 TABLET | Refills: 3 | Status: SHIPPED | OUTPATIENT
Start: 2018-10-02 | End: 2018-12-04

## 2018-10-02 NOTE — TELEPHONE ENCOUNTER
"----- Message from Aarti Elias sent at 10/2/2018 11:21 AM CDT -----  Regarding: BC refill was sent to pharm as \"injection\"??  [facility] calling - pts bc refill was apparently sent as injection so never made it to their refill pool.  Can her bc refill be resent please?  "

## 2018-10-02 NOTE — TELEPHONE ENCOUNTER
Tried to reach Mireille but received voicemail.  Left message that a new order is being sent to [facility]. Please check with them later today and call us with any questions.

## 2018-11-06 ASSESSMENT — ANXIETY QUESTIONNAIRES
1. FEELING NERVOUS, ANXIOUS, OR ON EDGE: NEARLY EVERY DAY
3. WORRYING TOO MUCH ABOUT DIFFERENT THINGS: NOT AT ALL
7. FEELING AFRAID AS IF SOMETHING AWFUL MIGHT HAPPEN: NOT AT ALL
GAD7 TOTAL SCORE: 5
7. FEELING AFRAID AS IF SOMETHING AWFUL MIGHT HAPPEN: NOT AT ALL
GAD7 TOTAL SCORE: 5
6. BECOMING EASILY ANNOYED OR IRRITABLE: NOT AT ALL
4. TROUBLE RELAXING: MORE THAN HALF THE DAYS
2. NOT BEING ABLE TO STOP OR CONTROL WORRYING: NOT AT ALL
5. BEING SO RESTLESS THAT IT IS HARD TO SIT STILL: NOT AT ALL

## 2018-11-06 ASSESSMENT — ENCOUNTER SYMPTOMS
NERVOUS/ANXIOUS: 1
PANIC: 1
DEPRESSION: 1
DECREASED CONCENTRATION: 0
DECREASED LIBIDO: 0
HOT FLASHES: 0
INSOMNIA: 0

## 2018-11-06 ASSESSMENT — PATIENT HEALTH QUESTIONNAIRE - PHQ9
10. IF YOU CHECKED OFF ANY PROBLEMS, HOW DIFFICULT HAVE THESE PROBLEMS MADE IT FOR YOU TO DO YOUR WORK, TAKE CARE OF THINGS AT HOME, OR GET ALONG WITH OTHER PEOPLE: EXTREMELY DIFFICULT
SUM OF ALL RESPONSES TO PHQ QUESTIONS 1-9: 14
SUM OF ALL RESPONSES TO PHQ QUESTIONS 1-9: 14

## 2018-11-08 ENCOUNTER — OFFICE VISIT (OUTPATIENT)
Dept: OBGYN | Facility: CLINIC | Age: 43
End: 2018-11-08
Attending: OBSTETRICS & GYNECOLOGY
Payer: COMMERCIAL

## 2018-11-08 VITALS
SYSTOLIC BLOOD PRESSURE: 123 MMHG | WEIGHT: 175 LBS | BODY MASS INDEX: 29.88 KG/M2 | HEART RATE: 81 BPM | DIASTOLIC BLOOD PRESSURE: 84 MMHG | HEIGHT: 64 IN

## 2018-11-08 DIAGNOSIS — Z01.419 ENCOUNTER FOR GYNECOLOGICAL EXAMINATION WITHOUT ABNORMAL FINDING: Primary | ICD-10-CM

## 2018-11-08 DIAGNOSIS — Z13.220 LIPID SCREENING: ICD-10-CM

## 2018-11-08 DIAGNOSIS — Z82.49 FAMILY HISTORY OF DEEP VENOUS THROMBOSIS: ICD-10-CM

## 2018-11-08 DIAGNOSIS — Z83.2 FAMILY HISTORY OF FACTOR V LEIDEN MUTATION: ICD-10-CM

## 2018-11-08 DIAGNOSIS — N93.9 ABNORMAL UTERINE BLEEDING (AUB): ICD-10-CM

## 2018-11-08 DIAGNOSIS — Z13.21 ENCOUNTER FOR VITAMIN DEFICIENCY SCREENING: ICD-10-CM

## 2018-11-08 DIAGNOSIS — Z13.1 SCREENING FOR DIABETES MELLITUS: ICD-10-CM

## 2018-11-08 LAB
CHOLEST SERPL-MCNC: 256 MG/DL
GLUCOSE SERPL-MCNC: 77 MG/DL (ref 70–99)
HBA1C MFR BLD: 4.9 % (ref 0–5.6)
HDLC SERPL-MCNC: 66 MG/DL
LDLC SERPL CALC-MCNC: 119 MG/DL
NONHDLC SERPL-MCNC: 190 MG/DL
PROLACTIN SERPL-MCNC: 6 UG/L (ref 3–27)
TRIGL SERPL-MCNC: 357 MG/DL
TSH SERPL DL<=0.005 MIU/L-ACNC: 3.6 MU/L (ref 0.4–4)

## 2018-11-08 PROCEDURE — 80061 LIPID PANEL: CPT | Performed by: OBSTETRICS & GYNECOLOGY

## 2018-11-08 PROCEDURE — 81241 F5 GENE: CPT | Performed by: OBSTETRICS & GYNECOLOGY

## 2018-11-08 PROCEDURE — 84443 ASSAY THYROID STIM HORMONE: CPT | Performed by: OBSTETRICS & GYNECOLOGY

## 2018-11-08 PROCEDURE — G0463 HOSPITAL OUTPT CLINIC VISIT: HCPCS | Mod: ZF

## 2018-11-08 PROCEDURE — 82306 VITAMIN D 25 HYDROXY: CPT | Performed by: OBSTETRICS & GYNECOLOGY

## 2018-11-08 PROCEDURE — 83036 HEMOGLOBIN GLYCOSYLATED A1C: CPT | Performed by: OBSTETRICS & GYNECOLOGY

## 2018-11-08 PROCEDURE — 84146 ASSAY OF PROLACTIN: CPT | Performed by: OBSTETRICS & GYNECOLOGY

## 2018-11-08 PROCEDURE — 36415 COLL VENOUS BLD VENIPUNCTURE: CPT | Performed by: OBSTETRICS & GYNECOLOGY

## 2018-11-08 PROCEDURE — 82947 ASSAY GLUCOSE BLOOD QUANT: CPT | Performed by: OBSTETRICS & GYNECOLOGY

## 2018-11-08 ASSESSMENT — ANXIETY QUESTIONNAIRES
5. BEING SO RESTLESS THAT IT IS HARD TO SIT STILL: NOT AT ALL
3. WORRYING TOO MUCH ABOUT DIFFERENT THINGS: NOT AT ALL
1. FEELING NERVOUS, ANXIOUS, OR ON EDGE: MORE THAN HALF THE DAYS
2. NOT BEING ABLE TO STOP OR CONTROL WORRYING: NOT AT ALL
GAD7 TOTAL SCORE: 3
6. BECOMING EASILY ANNOYED OR IRRITABLE: NOT AT ALL
7. FEELING AFRAID AS IF SOMETHING AWFUL MIGHT HAPPEN: NOT AT ALL

## 2018-11-08 ASSESSMENT — PAIN SCALES - GENERAL: PAINLEVEL: NO PAIN (0)

## 2018-11-08 ASSESSMENT — PATIENT HEALTH QUESTIONNAIRE - PHQ9: 5. POOR APPETITE OR OVEREATING: SEVERAL DAYS

## 2018-11-08 NOTE — PROGRESS NOTES
Progress Note    SUBJECTIVE:  Mireille Walls is an 43 year old  , who requests a breast and pelvic exam.    Patient is followed by [facility] for primary care.    Concerns today include: Has been on combined oral contraceptives for years to control dysmenorrhea and acne. She has a history of superficial phlebitis and migraine WITHOUT aura.  She has been counseled on risk of combined contraceptive methods and has done well on birth control pills.  Starting this spring she started having more breakthrough bleeding.  She spoke with our RN team and in  took a week off the pill to have a withdrawal bleed.  This helped for about one month.  She since then has gone back to having daily spotting.  She manages this, but she wants to make sure nothing is wrong.    She has noted weight gain over the last year as she has worked with her psychiatrist to treat her depression.  She developed constipation related to these medications and developed a hemorrhoid.  She managed this conservatively and does not feel it is an issue, but this has been frustrating.    Brother was diagnosed with DVT this year and found to be positive for the Factor V Leiden mutation.  Mireille would like to be screened.    Menstrual History:  Menstrual History 2013   LAST MENSTRUAL PERIOD 2013 - - 10/26/2015 -   Menarche age - 13 15 - 13   Period Cycle (Days) - very sporadic irregular - on continious birth control for dysmenorrhea  occ spottting   Period Duration (Days) - 4-5 3-4 - -   Method of Contraception - - None - -   Period Pattern - Irregular Irregular - Irregular   Menstrual Flow - Moderate Light - Light   Menstrual Control - - Tampon - -   Dysmenorrhea - Severe Mild - None   PMS Symptoms - None Cramping - -   Reviewed Today - Yes Yes - Yes   Comments - States for the last 10-15 years menses have been so sporadic she can't even tell us when her last one was. - - -       Last     Lab Results   Component Value Date    PAP NIL 06/16/2016     History of abnormal Pap smear: remote (not in our records) history of + HPV, clear on follow-up    Last   Lab Results   Component Value Date    HPV16 Negative 06/16/2016     Last   Lab Results   Component Value Date    HPV18 Negative 06/16/2016     Last   Lab Results   Component Value Date    HRHPV Negative 06/16/2016       Mammogram current: yes    HISTORY:  Prescription Medications as of 11/8/2018             buPROPion (WELLBUTRIN XL) 150 MG 24 hr tablet Take 150 mg by mouth every morning.    CALCIUM PO Take  by mouth.    COD LIVER OIL PO Take  by mouth.    diclofenac (VOLTAREN) 1 % GEL topical gel Apply 4 grams to back 3x daily    Fluvoxamine Maleate 100 MG CP24 Take 200 mg by mouth daily.    ketoconazole (NIZORAL) 2 % shampoo Apply topically to scalp, lather, leave on for 3-5 minutes, then rinse.  Use ever other day    loratadine (CLARITIN) 10 MG tablet Take 10 mg by mouth daily    metaxalone (SKELAXIN) 800 MG tablet Take 1 tablet (800 mg) by mouth 3 times daily as needed for moderate pain    Multiple Vitamins-Minerals (MULTIPLE VITAMINS/WOMENS PO) Take  by mouth.    naproxen (NAPROSYN) 500 MG tablet Take 1 tablet (500 mg) by mouth 2 times daily as needed for moderate pain    norgestimate-ethinyl estradiol (ORTHO-CYCLEN, SPRINTEC) 0.25-35 MG-MCG per tablet Take 1 tablet by mouth daily Continuously.    Probiotic Product (PROBIOTIC PO) Take  by mouth.    rizatriptan (MAXALT-MLT) 5 MG ODT tab Take 1-2 tablets (5-10 mg) by mouth at onset of headache for migraine May repeat dose in 2 hours.  Do not exceed 30 mg in 24 hours    vortioxetine (TRINTELLIX) 10 MG tablet Take 1 tablet (10 mg) by mouth daily        Allergies   Allergen Reactions     Phenergan Vc [Promethazine Vc] Visual Disturbance     Immunization History   Administered Date(s) Administered     Influenza (IIV3) PF 10/18/2012, 10/23/2014     Tdap (Adacel,Boostrix) 03/02/2010       Obstetric  History       T0      L0     SAB0   TAB0   Ectopic0   Multiple0   Live Births0      Past Medical History:   Diagnosis Date     Abnormal Pap smear 2006?    dysplasia  X 1; paps OK since then...     Arm DVT (deep venous thromboembolism), acute (H) 2008    hx with phlebitis in IVs after a surgery     Breast disorder 2016    2D mammo, then 3D & u/s to r/o = scar tissue from reduction     Cholesterol serum elevated     runs in family     Complication of anesthesia 2000?, 2016    very slow to awake from both gen. anesth. & conscious sedati     Depression      Encounter for insertion of mirena IUD     D/C'd w increased acne     Hypertension 2017&2018    ajmin. during exercise, caused by stimulant for depression?     IBS (irritable bowel syndrome)     under control, better with probiotic     Menarche age 15    cycles q 1-2 months Xs 3-4 d w bad cramps     Migraines 2008    a lot better now, may be stress related     OCD (obsessive compulsive disorder)      Seasonal allergies      Past Surgical History:   Procedure Laterality Date     BIOPSY      dermatology-moles     HEAD & NECK SURGERY  ?, 2016    wisdom teeth extracted, gum tissue grafting/oral surgery     LASIK Bilateral 2008     MAMMOPLASTY REDUCTION BILATERAL  1197     Family History   Problem Relation Age of Onset     Cancer Father 57     bladder ca     Genitourinary Problems Father      Polycystic kidney     Other Cancer Father      bladder cancer     Genetic Disorder Father      polycystic kidneys     Cancer - colorectal Paternal Grandmother 75     Colon Cancer Paternal Grandmother      Mental Illness Other      SeeBelow     Cancer Mother      skin cancer     Hyperlipidemia Mother      runs in Mom's family-several     Other Cancer Mother      skin cancer     Asthma Mother      runs in Mom's family     Alcohol/Drug Paternal Aunt      Xs 2     Thyroid Disease Maternal Grandmother      hypothyroid     Obesity Maternal Grandmother       Coronary Artery Disease Maternal Grandfather      heart attack     Mental Illness Other      Schizophrenia     Mental Illness Cousin      OCD     Depression Other      chronic and episodic     Mental Illness Other      ChronicMajorDepression     Anxiety Disorder Other      gen. anxiety disorder     Mental Illness Other      OCD     Substance Abuse Other      alcoholism-runs in Dad's family     Genetic Disorder Other      polycystic kidneys     Genetic Disorder Brother      [id number]     Asthma Brother      Genetic Disorder Other      [id number]     C.A.D. No family hx of      Hypertension No family hx of      Breast Cancer No family hx of      Social History     Social History     Marital status: Single     Spouse name: N/A     Number of children: N/A     Years of education: N/A     Occupational History     [employer]     Social History Main Topics     Smoking status: Never Smoker     Smokeless tobacco: Never Used     Alcohol use No     Drug use: No     Sexual activity: Not Currently     Partners: Male     Birth control/ protection: Abstinence, Pill     Other Topics Concern      Service No     Blood Transfusions No     Caffeine Concern No     Occupational Exposure No     Hobby Hazards No     Sleep Concern Yes     sleeps 18+ hours r/t depression     Stress Concern No     Weight Concern Yes     Special Diet No     Back Care No     Exercise Yes     Social History Narrative    How much exercise per week? Daily actvities Very littleHow much calcium per day? 600mg   How much caffeine per day? 1 can diet sodaHow much vitamin D per day? SupplementDo you/your family wear seatbelts?  YesDo you/your family use safety helmets? YesDo you/your family use sunscreen? YesDo you/your family keep firearms in the home? NoDo you/your family have a smoke detector(s)? YesDo you feel safe in your home? YesHas anyone ever touched you in an unwanted manner? No Explain Carmen Morrissey CMA 01/08/2015     "Reviewed [facility] 6-       ROS  Answers for HPI/ROS submitted by the patient on 11/6/2018   CHRISTELLE 7 TOTAL SCORE: 5  PHQ-2 Score: 5  If you checked off any problems, how difficult have these problems made it for you to do your work, take care of things at home, or get along with other people?: Extremely difficult  PHQ9 TOTAL SCORE: 14  Patient is under care of psychiatry and denies SI/HI    EXAM:  Blood pressure 123/84, pulse 81, height 1.626 m (5' 4\"), weight 79.4 kg (175 lb), not currently breastfeeding. Body mass index is 30.04 kg/(m^2).  General appearance: Pleasant female in no acute distress.     BREAST EXAM:  Breast: Without visible skin changes, well healed scars from prior reduction mammoplasty. No dimpling or lesions seen.   Breasts supple, non-tender with palpation, no dominant mass, nodularity, or nipple discharge noted bilaterally. Axillary nodes negative.      PELVIC EXAM:  EG/BUS: Normal genital architecture without lesions, erythema or abnormal secretions Bartholin's, Urethra, [place]'s normal   Urethral meatus: normal    Urethra: no masses, tenderness, or scarring    Bladder: no masses or tenderness    Vagina: moist, pink, rugae with creamy, white and odorless  secretions  Cervix: no lesions and pink, moist, closed, without lesion or CMT  Uterus: anteverted,  and small, smooth, firm, mobile w/o pain  Adnexa: Within normal limits and No masses, nodularity, tenderness  Rectum:anus normal, no visible hemorrhoid       ASSESSMENT:  Encounter Diagnoses   Name Primary?     Encounter for gynecological examination without abnormal finding Yes     Family history of deep venous thrombosis      Family history of factor V Leiden mutation      Screening for diabetes mellitus      Abnormal uterine bleeding (AUB)      Lipid screening      Encounter for vitamin deficiency screening       43 year old Female Pelvic and Breast Exam  AUB    PLAN:   Orders Placed This Encounter   Procedures     Pelvic and Breast Exam " Procedure []     US Pelvic Complete with Transvaginal     25- OH-Vitamin D     Prolactin     TSH with free T4 reflex     Hemoglobin A1c     Glucose     Lipid Profile     Factor 5 leiden mutation analysis     Discussed initial work-up for AUB, most likely breakthrough bleeding on OCP.  Given age and weight gain will screen for structural causes with ultrasound and consider EMB based on those results.  Will return to clinic after ultrasound to discuss results and discuss plan.  If work-up is normal patient is comfortable managing bleeding and continuing OCPs given other benefits.    Will screen for Factor V Leiden given new familial diagnosis.  Patient aware if positive will need to discontinue OCPs.    Return after ultrasound     Verbalized understanding and agreement with visit plan.    Milagro Sandhu MD

## 2018-11-08 NOTE — LETTER
2018       RE: Mireille Walls  [address] [place] [address]     Dear Colleague,    Thank you for referring your patient, Mireille Walls, to the [facility] at [facility]. Please see a copy of my visit note below.        Progress Note    SUBJECTIVE:  Mireille Walls is an 43 year old  , who requests a breast and pelvic exam.    Patient is followed by [facility] for primary care.    Concerns today include: Has been on combined oral contraceptives for years to control dysmenorrhea and acne. She has a history of superficial phlebitis and migraine WITHOUT aura.  She has been counseled on risk of combined contraceptive methods and has done well on birth control pills.  Starting this spring she started having more breakthrough bleeding.  She spoke with our RN team and in  took a week off the pill to have a withdrawal bleed.  This helped for about one month.  She since then has gone back to having daily spotting.  She manages this, but she wants to make sure nothing is wrong.    She has noted weight gain over the last year as she has worked with her psychiatrist to treat her depression.  She developed constipation related to these medications and developed a hemorrhoid.  She managed this conservatively and does not feel it is an issue, but this has been frustrating.    Brother was diagnosed with DVT this year and found to be positive for the Factor V Leiden mutation.  Mireille would like to be screened.    Menstrual History:  Menstrual History 2013   LAST MENSTRUAL PERIOD 2013 - - 10/26/2015 -   Menarche age - 13 15 - 13   Period Cycle (Days) - very sporadic irregular - on continious birth control for dysmenorrhea  occ spottting   Period Duration (Days) - 4-5 3-4 - -   Method of Contraception - - None - -   Period Pattern - Irregular Irregular - Irregular   Menstrual Flow -  Moderate Light - Light   Menstrual Control - - Tampon - -   Dysmenorrhea - Severe Mild - None   PMS Symptoms - None Cramping - -   Reviewed Today - Yes Yes - Yes   Comments - States for the last 10-15 years menses have been so sporadic she can't even tell us when her last one was. - - -       Last    Lab Results   Component Value Date    PAP NIL 06/16/2016     History of abnormal Pap smear: remote (not in our records) history of + HPV, clear on follow-up    Last   Lab Results   Component Value Date    HPV16 Negative 06/16/2016     Last   Lab Results   Component Value Date    HPV18 Negative 06/16/2016     Last   Lab Results   Component Value Date    HRHPV Negative 06/16/2016       Mammogram current: yes    HISTORY:  Prescription Medications as of 11/8/2018             buPROPion (WELLBUTRIN XL) 150 MG 24 hr tablet Take 150 mg by mouth every morning.    CALCIUM PO Take  by mouth.    COD LIVER OIL PO Take  by mouth.    diclofenac (VOLTAREN) 1 % GEL topical gel Apply 4 grams to back 3x daily    Fluvoxamine Maleate 100 MG CP24 Take 200 mg by mouth daily.    ketoconazole (NIZORAL) 2 % shampoo Apply topically to scalp, lather, leave on for 3-5 minutes, then rinse.  Use ever other day    loratadine (CLARITIN) 10 MG tablet Take 10 mg by mouth daily    metaxalone (SKELAXIN) 800 MG tablet Take 1 tablet (800 mg) by mouth 3 times daily as needed for moderate pain    Multiple Vitamins-Minerals (MULTIPLE VITAMINS/WOMENS PO) Take  by mouth.    naproxen (NAPROSYN) 500 MG tablet Take 1 tablet (500 mg) by mouth 2 times daily as needed for moderate pain    norgestimate-ethinyl estradiol (ORTHO-CYCLEN, SPRINTEC) 0.25-35 MG-MCG per tablet Take 1 tablet by mouth daily Continuously.    Probiotic Product (PROBIOTIC PO) Take  by mouth.    rizatriptan (MAXALT-MLT) 5 MG ODT tab Take 1-2 tablets (5-10 mg) by mouth at onset of headache for migraine May repeat dose in 2 hours.  Do not exceed 30 mg in 24 hours    vortioxetine (TRINTELLIX) 10 MG  tablet Take 1 tablet (10 mg) by mouth daily        Allergies   Allergen Reactions     Phenergan Vc [Promethazine Vc] Visual Disturbance     Immunization History   Administered Date(s) Administered     Influenza (IIV3) PF 10/18/2012, 10/23/2014     Tdap (Adacel,Boostrix) 2010       Obstetric History       T0      L0     SAB0   TAB0   Ectopic0   Multiple0   Live Births0      Past Medical History:   Diagnosis Date     Abnormal Pap smear ?    dysplasia  X 1; paps OK since then...     Arm DVT (deep venous thromboembolism), acute (H)     hx with phlebitis in IVs after a surgery     Breast disorder 2016    2D mammo, then 3D & u/s to r/o = scar tissue from reduction     Cholesterol serum elevated     runs in family     Complication of anesthesia ?, 2016    very slow to awake from both gen. anesth. & conscious sedati     Depression      Encounter for insertion of mirena IUD     D/C'd w increased acne     Hypertension &2018    jamin. during exercise, caused by stimulant for depression?     IBS (irritable bowel syndrome)     under control, better with probiotic     Menarche age 15    cycles q 1-2 months Xs 3-4 d w bad cramps     Migraines 2008    a lot better now, may be stress related     OCD (obsessive compulsive disorder)      Seasonal allergies      Past Surgical History:   Procedure Laterality Date     BIOPSY      dermatology-moles     HEAD & NECK SURGERY  ?, 2016    wisdom teeth extracted, gum tissue grafting/oral surgery     LASIK Bilateral      MAMMOPLASTY REDUCTION BILATERAL  1197     Family History   Problem Relation Age of Onset     Cancer Father 57     bladder ca     Genitourinary Problems Father      Polycystic kidney     Other Cancer Father      bladder cancer     Genetic Disorder Father      polycystic kidneys     Cancer - colorectal Paternal Grandmother 75     Colon Cancer Paternal Grandmother      Mental Illness Other      SeeBelow     Cancer Mother       skin cancer     Hyperlipidemia Mother      runs in Mom's family-several     Other Cancer Mother      skin cancer     Asthma Mother      runs in Mom's family     Alcohol/Drug Paternal Aunt      Xs 2     Thyroid Disease Maternal Grandmother      hypothyroid     Obesity Maternal Grandmother      Coronary Artery Disease Maternal Grandfather      heart attack     Mental Illness Other      Schizophrenia     Mental Illness Cousin      OCD     Depression Other      chronic and episodic     Mental Illness Other      ChronicMajorDepression     Anxiety Disorder Other      gen. anxiety disorder     Mental Illness Other      OCD     Substance Abuse Other      alcoholism-runs in Dad's family     Genetic Disorder Other      polycystic kidneys     Genetic Disorder Brother      [id number]     Asthma Brother      Genetic Disorder Other      [id number]     C.A.D. No family hx of      Hypertension No family hx of      Breast Cancer No family hx of      Social History     Social History     Marital status: Single     Spouse name: N/A     Number of children: N/A     Years of education: N/A     Occupational History     [employer]     Social History Main Topics     Smoking status: Never Smoker     Smokeless tobacco: Never Used     Alcohol use No     Drug use: No     Sexual activity: Not Currently     Partners: Male     Birth control/ protection: Abstinence, Pill     Other Topics Concern      Service No     Blood Transfusions No     Caffeine Concern No     Occupational Exposure No     Hobby Hazards No     Sleep Concern Yes     sleeps 18+ hours r/t depression     Stress Concern No     Weight Concern Yes     Special Diet No     Back Care No     Exercise Yes     Social History Narrative    How much exercise per week? Daily actvities Very littleHow much calcium per day? 600mg   How much caffeine per day? 1 can diet sodaHow much vitamin D per day? SupplementDo you/your family wear seatbelts?  YesDo you/your  "family use safety helmets? YesDo you/your family use sunscreen? YesDo you/your family keep firearms in the home? NoDo you/your family have a smoke detector(s)? YesDo you feel safe in your home? YesHas anyone ever touched you in an unwanted manner? No Explain Carmen Morrissey, [facility] 01/08/2015    [facility]       ROS  Answers for HPI/ROS submitted by the patient on 11/6/2018   CHRISTELLE 7 TOTAL SCORE: 5  PHQ-2 Score: 5  If you checked off any problems, how difficult have these problems made it for you to do your work, take care of things at home, or get along with other people?: Extremely difficult  PHQ9 TOTAL SCORE: 14  Patient is under care of psychiatry and denies SI/HI    EXAM:  Blood pressure 123/84, pulse 81, height 1.626 m (5' 4\"), weight 79.4 kg (175 lb), not currently breastfeeding. Body mass index is 30.04 kg/(m^2).  General appearance: Pleasant female in no acute distress.     BREAST EXAM:  Breast: Without visible skin changes, well healed scars from prior reduction mammoplasty. No dimpling or lesions seen.   Breasts supple, non-tender with palpation, no dominant mass, nodularity, or nipple discharge noted bilaterally. Axillary nodes negative.      PELVIC EXAM:  EG/BUS: Normal genital architecture without lesions, erythema or abnormal secretions Bartholin's, Urethra, [place]'s normal   Urethral meatus: normal    Urethra: no masses, tenderness, or scarring    Bladder: no masses or tenderness    Vagina: moist, pink, rugae with creamy, white and odorless  secretions  Cervix: no lesions and pink, moist, closed, without lesion or CMT  Uterus: anteverted,  and small, smooth, firm, mobile w/o pain  Adnexa: Within normal limits and No masses, nodularity, tenderness  Rectum:anus normal, no visible hemorrhoid       ASSESSMENT:  Encounter Diagnoses   Name Primary?     Encounter for gynecological examination without abnormal finding Yes     Family history of deep venous thrombosis      Family history of factor V " Leiden mutation      Screening for diabetes mellitus      Abnormal uterine bleeding (AUB)      Lipid screening      Encounter for vitamin deficiency screening       43 year old Female Pelvic and Breast Exam  AUB    PLAN:   Orders Placed This Encounter   Procedures     Pelvic and Breast Exam Procedure []     US Pelvic Complete with Transvaginal     25- OH-Vitamin D     Prolactin     TSH with free T4 reflex     Hemoglobin A1c     Glucose     Lipid Profile     Factor 5 leiden mutation analysis     Discussed initial work-up for AUB, most likely breakthrough bleeding on OCP.  Given age and weight gain will screen for structural causes with ultrasound and consider EMB based on those results.  Will return to clinic after ultrasound to discuss results and discuss plan.  If work-up is normal patient is comfortable managing bleeding and continuing OCPs given other benefits.    Will screen for Factor V Leiden given new familial diagnosis.  Patient aware if positive will need to discontinue OCPs.    Return after ultrasound     Verbalized understanding and agreement with visit plan.    Milagro Sandhu MD

## 2018-11-08 NOTE — MR AVS SNAPSHOT
After Visit Summary   11/8/2018    Mireille Walls    MRN: [id number]           Patient Information     Date Of Birth          1975        Visit Information        Provider Department      11/8/2018 11:00 AM Milagro Sandhu MD [facility]        Today's Diagnoses     Encounter for gynecological examination without abnormal finding    -  1    Family history of deep venous thrombosis        Family history of factor V Leiden mutation        Screening for diabetes mellitus        Abnormal uterine bleeding (AUB)        Lipid screening        Encounter for vitamin deficiency screening           Follow-ups after your visit        Follow-up notes from your care team     Return for Follow-up ultrasound and visit.      Who to contact     Please call your clinic at [phone] to:    Ask questions about your health    Make or cancel appointments    Discuss your medicines    Learn about your test results    Speak to your doctor            Additional Information About Your Visit        MyChart Information     [employer] gives you secure access to your electronic health record. If you see a primary care provider, you can also send messages to your care team and make appointments. If you have questions, please call your primary care clinic.  If you do not have a primary care provider, please call [phone] and they will assist you.      [employer] is an electronic gateway that provides easy, online access to your medical records. With [employer], you can request a clinic appointment, read your test results, renew a prescription or communicate with your care team.     To access your existing account, please contact your [facility] or call [phone] for assistance.        Care EveryWhere ID     This is your Care EveryWhere ID. This could be used by other organizations to access your [place] medical records  [id number]        Your Vitals Were     Pulse Height  "Breastfeeding? BMI (Body Mass Index)          81 1.626 m (5' 4\") No 30.04 kg/m2         Blood Pressure from Last 3 Encounters:   11/08/18 123/84   08/26/18 136/88   08/08/17 138/86    Weight from Last 3 Encounters:   11/08/18 79.4 kg (175 lb)   08/26/18 76.9 kg (169 lb 8 oz)   08/08/17 75.3 kg (166 lb)              We Performed the Following     25- [place]-Vitamin D     Factor 5 leiden mutation analysis     Glucose     Hemoglobin A1c     Lipid Profile     Pelvic and Breast Exam Procedure []     Prolactin     TSH with free T4 reflex        Primary Care Provider Fax #    Physician No Ref-Primary [phone]       No address on file        Equal Access to Services     ZULEMA PADILLA : Reynaldo Guzman, rivera ramirez, devika henningalanthony villa, karen preciado . So [facility] [phone].    ATENCIÓN: Si habla español, tiene a flores disposición servicios gratuitos de asistencia lingüística. Llame al [phone].    We comply with applicable federal civil rights laws and [place] laws. We do not discriminate on the basis of race, color, national origin, age, disability, sex, sexual orientation, or gender identity.            Thank you!     Thank you for choosing [facility]  for your care. Our goal is always to provide you with excellent care. Hearing back from our patients is one way we can continue to improve our services. Please take a few minutes to complete the written survey that you may receive in the mail after your visit with us. Thank you!             Your Updated Medication List - Protect others around you: Learn how to safely use, store and throw away your medicines at www.disposemymeds.org.          This list is accurate as of 11/8/18  3:40 PM.  Always use your most recent med list.                   Brand Name Dispense Instructions for use Diagnosis    CALCIUM PO      Take  by mouth.        CLARITIN 10 MG tablet   Generic drug:  loratadine      Take 10 " mg by mouth daily        COD LIVER OIL PO      Take  by mouth.        diclofenac 1 % Gel topical gel    VOLTAREN    100 g    Apply 4 grams to back 3x daily    Acute mid back pain       fluvoxaMINE Maleate 100 MG 24 hr capsule    LUVOX CR     Take 200 mg by mouth daily.        ketoconazole 2 % shampoo    NIZORAL    120 mL    Apply topically to scalp, lather, leave on for 3-5 minutes, then rinse.  Use ever other day    Dermatitis, seborrheic       metaxalone 800 MG tablet    SKELAXIN    30 tablet    Take 1 tablet (800 mg) by mouth 3 times daily as needed for moderate pain    Acute mid back pain       MULTIPLE VITAMINS/WOMENS PO      Take  by mouth.        naproxen 500 MG tablet    NAPROSYN    30 tablet    Take 1 tablet (500 mg) by mouth 2 times daily as needed for moderate pain    Acute mid back pain       norgestimate-ethinyl estradiol 0.25-35 MG-MCG per tablet    ORTHO-CYCLEN, SPRINTEC    112 tablet    Take 1 tablet by mouth daily Continuously.    Surveillance of previously prescribed contraceptive pill       PROBIOTIC PO      Take  by mouth.        rizatriptan 5 MG ODT tab    MAXALT-MLT    10 tablet    Take 1-2 tablets (5-10 mg) by mouth at onset of headache for migraine May repeat dose in 2 hours.  Do not exceed 30 mg in 24 hours    Migraine without aura and without status migrainosus, not intractable       TRINTELLIX 10 MG tablet   Generic drug:  vortioxetine      Take 1 tablet (10 mg) by mouth daily        WELLBUTRIN  MG 24 hr tablet   Generic drug:  buPROPion      Take 150 mg by mouth every morning.

## 2018-11-09 LAB — DEPRECATED CALCIDIOL+CALCIFEROL SERPL-MC: 61 UG/L (ref 20–75)

## 2018-11-12 LAB — COPATH REPORT: NORMAL

## 2018-11-28 ENCOUNTER — RADIANT APPOINTMENT (OUTPATIENT)
Dept: ULTRASOUND IMAGING | Facility: CLINIC | Age: 43
End: 2018-11-28
Payer: COMMERCIAL

## 2018-11-28 DIAGNOSIS — N93.9 ABNORMAL UTERINE BLEEDING: ICD-10-CM

## 2018-11-28 PROCEDURE — 76830 TRANSVAGINAL US NON-OB: CPT

## 2018-12-03 ASSESSMENT — PATIENT HEALTH QUESTIONNAIRE - PHQ9
SUM OF ALL RESPONSES TO PHQ QUESTIONS 1-9: 13
SUM OF ALL RESPONSES TO PHQ QUESTIONS 1-9: 13
10. IF YOU CHECKED OFF ANY PROBLEMS, HOW DIFFICULT HAVE THESE PROBLEMS MADE IT FOR YOU TO DO YOUR WORK, TAKE CARE OF THINGS AT HOME, OR GET ALONG WITH OTHER PEOPLE: EXTREMELY DIFFICULT
SUM OF ALL RESPONSES TO PHQ QUESTIONS 1-9: 13
10. IF YOU CHECKED OFF ANY PROBLEMS, HOW DIFFICULT HAVE THESE PROBLEMS MADE IT FOR YOU TO DO YOUR WORK, TAKE CARE OF THINGS AT HOME, OR GET ALONG WITH OTHER PEOPLE: EXTREMELY DIFFICULT
SUM OF ALL RESPONSES TO PHQ QUESTIONS 1-9: 13

## 2018-12-03 ASSESSMENT — ANXIETY QUESTIONNAIRES
2. NOT BEING ABLE TO STOP OR CONTROL WORRYING: NOT AT ALL
5. BEING SO RESTLESS THAT IT IS HARD TO SIT STILL: NOT AT ALL
1. FEELING NERVOUS, ANXIOUS, OR ON EDGE: MORE THAN HALF THE DAYS
7. FEELING AFRAID AS IF SOMETHING AWFUL MIGHT HAPPEN: NOT AT ALL
7. FEELING AFRAID AS IF SOMETHING AWFUL MIGHT HAPPEN: NOT AT ALL
GAD7 TOTAL SCORE: 3
4. TROUBLE RELAXING: SEVERAL DAYS
GAD7 TOTAL SCORE: 3
6. BECOMING EASILY ANNOYED OR IRRITABLE: NOT AT ALL
1. FEELING NERVOUS, ANXIOUS, OR ON EDGE: MORE THAN HALF THE DAYS
7. FEELING AFRAID AS IF SOMETHING AWFUL MIGHT HAPPEN: NOT AT ALL
6. BECOMING EASILY ANNOYED OR IRRITABLE: NOT AT ALL
3. WORRYING TOO MUCH ABOUT DIFFERENT THINGS: NOT AT ALL
7. FEELING AFRAID AS IF SOMETHING AWFUL MIGHT HAPPEN: NOT AT ALL
2. NOT BEING ABLE TO STOP OR CONTROL WORRYING: NOT AT ALL
3. WORRYING TOO MUCH ABOUT DIFFERENT THINGS: NOT AT ALL
5. BEING SO RESTLESS THAT IT IS HARD TO SIT STILL: NOT AT ALL
4. TROUBLE RELAXING: SEVERAL DAYS
GAD7 TOTAL SCORE: 3
GAD7 TOTAL SCORE: 3

## 2018-12-04 ENCOUNTER — OFFICE VISIT (OUTPATIENT)
Dept: INTERNAL MEDICINE | Facility: CLINIC | Age: 43
End: 2018-12-04
Attending: INTERNAL MEDICINE
Payer: COMMERCIAL

## 2018-12-04 VITALS
DIASTOLIC BLOOD PRESSURE: 79 MMHG | BODY MASS INDEX: 29.97 KG/M2 | WEIGHT: 174.6 LBS | HEART RATE: 84 BPM | SYSTOLIC BLOOD PRESSURE: 117 MMHG

## 2018-12-04 DIAGNOSIS — E78.2 MIXED HYPERLIPIDEMIA: Primary | ICD-10-CM

## 2018-12-04 PROCEDURE — G0463 HOSPITAL OUTPT CLINIC VISIT: HCPCS | Mod: ZF

## 2018-12-04 ASSESSMENT — ANXIETY QUESTIONNAIRES
GAD7 TOTAL SCORE: 3
GAD7 TOTAL SCORE: 3

## 2018-12-04 ASSESSMENT — PATIENT HEALTH QUESTIONNAIRE - PHQ9
SUM OF ALL RESPONSES TO PHQ QUESTIONS 1-9: 13
SUM OF ALL RESPONSES TO PHQ QUESTIONS 1-9: 13

## 2018-12-04 NOTE — PROGRESS NOTES
HPI  Patient is here for follow up on recent lipid panel. She reports that she has been trying to increase physical activity. She denies family history of premature coronary artery disease.     Review of Systems     Constitutional:  Negative for fever, chills and fatigue.   HENT:  Negative for dry mouth and sinus congestion.    Eyes:  Negative for decreased vision.   Respiratory:   Negative for cough, shortness of breath, dyspnea on exertion and postural dyspnea.    Cardiovascular:  Negative for chest pain, dyspnea on exertion and edema.   Gastrointestinal:  Negative for nausea, vomiting, abdominal pain, diarrhea and constipation.   Musculoskeletal:  Negative for back pain, arthralgias and bone pain.   Skin:  Negative for itching and rash.   Neurological:  Negative for tremors, loss of consciousness and headaches.   Endo/Heme:  Negative for anemia, swollen glands and bruises/bleeds easily.   Psychiatric/Behavioral:  Negative for depression, decreased concentration, mood swings and panic attacks.    Endocrine:  Negative for altered temperature regulation, polyphagia, polydipsia, unwanted hair growth and change in facial hair.    Current Outpatient Prescriptions   Medication     buPROPion (WELLBUTRIN XL) 150 MG 24 hr tablet     CALCIUM PO     COD LIVER OIL PO     Cholecalciferol (D3 VITAMIN PO)     ketoconazole (NIZORAL) 2 % shampoo     loratadine (CLARITIN) 10 MG tablet     Multiple Vitamins-Minerals (MULTIPLE VITAMINS/WOMENS PO)     Probiotic Product (PROBIOTIC PO)     rizatriptan (MAXALT-MLT) 5 MG ODT tab     vortioxetine (TRINTELLIX) 10 MG tablet     No current facility-administered medications for this visit.      Vitals:    12/04/18 1110   BP: 117/79   BP Location: Left arm   Patient Position: Chair   Pulse: 84   Weight: 79.2 kg (174 lb 9.6 oz)         Physical Exam   Constitutional: She is oriented to person, place, and time and well-developed, well-nourished, and in no distress.   HENT:   Head: Normocephalic  and atraumatic.   Eyes: Conjunctivae are normal. Pupils are equal, round, and reactive to light.   Neck: Normal range of motion.   Cardiovascular: Normal rate.   Pulmonary/Chest: Effort normal.   Musculoskeletal: Normal range of motion. She exhibits no edema.   Neurological: She is alert and oriented to person, place, and time.   Psychiatric: Mood, memory, affect and judgment normal.   Vitals reviewed.        Answers for HPI/ROS submitted by the patient on 12/3/2018   CHRISTELLE 7 TOTAL SCORE: 3  If you checked off any problems, how difficult have these problems made it for you to do your work, take care of things at home, or get along with other people?: Extremely difficult  PHQ9 TOTAL SCORE: 13    Assessment and Plan:  Mireille was seen today for follow up for.    Diagnoses and all orders for this visit:    Mixed hyperlipidemia. Reviewed lifestyle modifications aimed at lipid lowering. Patient was advised on regular exercise and diet change. Recommend recheck in 6-12 months. Patient is in agreement with the plan.   -     TSH with free T4 reflex; Future      Total time spent 15 minutes.  More than 50% of the time spent with Ms. Walls on counseling / coordinating her care    Jazzy Llamas MD

## 2018-12-04 NOTE — NURSING NOTE
Chief Complaint   Patient presents with     Follow Up For     Chronic fatigue, depression       See FELIPE Gill 12/4/2018

## 2018-12-04 NOTE — LETTER
12/4/2018       RE: Mireille Walls  [address] [place] [address]     Dear Colleague,    Thank you for referring your patient, Mireille Walls, to the [facility]  at [facility]. Please see a copy of my visit note below.    HPI  Patient is here for follow up on recent lipid panel. She reports that she has been trying to increase physical activity. She denies family history of premature coronary artery disease.     Review of Systems     Constitutional:  Negative for fever, chills and fatigue.   HENT:  Negative for dry mouth and sinus congestion.    Eyes:  Negative for decreased vision.   Respiratory:   Negative for cough, shortness of breath, dyspnea on exertion and postural dyspnea.    Cardiovascular:  Negative for chest pain, dyspnea on exertion and edema.   Gastrointestinal:  Negative for nausea, vomiting, abdominal pain, diarrhea and constipation.   Musculoskeletal:  Negative for back pain, arthralgias and bone pain.   Skin:  Negative for itching and rash.   Neurological:  Negative for tremors, loss of consciousness and headaches.   Endo/Heme:  Negative for anemia, swollen glands and bruises/bleeds easily.   Psychiatric/Behavioral:  Negative for depression, decreased concentration, mood swings and panic attacks.    Endocrine:  Negative for altered temperature regulation, polyphagia, polydipsia, unwanted hair growth and change in facial hair.    Current Outpatient Prescriptions   Medication     buPROPion (WELLBUTRIN XL) 150 MG 24 hr tablet     CALCIUM PO     COD LIVER OIL PO     Cholecalciferol (D3 VITAMIN PO)     ketoconazole (NIZORAL) 2 % shampoo     loratadine (CLARITIN) 10 MG tablet     Multiple Vitamins-Minerals (MULTIPLE VITAMINS/WOMENS PO)     Probiotic Product (PROBIOTIC PO)     rizatriptan (MAXALT-MLT) 5 MG ODT tab     vortioxetine (TRINTELLIX) 10 MG tablet     No current facility-administered medications for this visit.      Vitals:     12/04/18 1110   BP: 117/79   BP Location: Left arm   Patient Position: Chair   Pulse: 84   Weight: 79.2 kg (174 lb 9.6 oz)         Physical Exam   Constitutional: She is oriented to person, place, and time and well-developed, well-nourished, and in no distress.   HENT:   Head: Normocephalic and atraumatic.   Eyes: Conjunctivae are normal. Pupils are equal, round, and reactive to light.   Neck: Normal range of motion.   Cardiovascular: Normal rate.   Pulmonary/Chest: Effort normal.   Musculoskeletal: Normal range of motion. She exhibits no edema.   Neurological: She is alert and oriented to person, place, and time.   Psychiatric: Mood, memory, affect and judgment normal.   Vitals reviewed.        Answers for HPI/ROS submitted by the patient on 12/3/2018   CHRISTELLE 7 TOTAL SCORE: 3  If you checked off any problems, how difficult have these problems made it for you to do your work, take care of things at home, or get along with other people?: Extremely difficult  PHQ9 TOTAL SCORE: 13    Assessment and Plan:  Mireille was seen today for follow up for.    Diagnoses and all orders for this visit:    Mixed hyperlipidemia. Reviewed lifestyle modifications aimed at lipid lowering. Patient was advised on regular exercise and diet change. Recommend recheck in 6-12 months. Patient is in agreement with the plan.   -     TSH with free T4 reflex; Future      Total time spent 15 minutes.  More than 50% of the time spent with Ms. Walls on counseling / coordinating her care    Jazzy Llamas MD              Again, thank you for allowing me to participate in the care of your patient.      Sincerely,    Jazzy Llamas MD

## 2018-12-06 ENCOUNTER — OFFICE VISIT (OUTPATIENT)
Dept: OBGYN | Facility: CLINIC | Age: 43
End: 2018-12-06
Attending: OBSTETRICS & GYNECOLOGY
Payer: COMMERCIAL

## 2018-12-06 VITALS
DIASTOLIC BLOOD PRESSURE: 80 MMHG | SYSTOLIC BLOOD PRESSURE: 112 MMHG | HEART RATE: 76 BPM | WEIGHT: 175 LBS | BODY MASS INDEX: 30.04 KG/M2

## 2018-12-06 DIAGNOSIS — R93.89 INCREASED ENDOMETRIAL STRIPE THICKNESS: Primary | ICD-10-CM

## 2018-12-06 PROCEDURE — 58100 BIOPSY OF UTERUS LINING: CPT | Mod: ZF | Performed by: OBSTETRICS & GYNECOLOGY

## 2018-12-06 PROCEDURE — 88305 TISSUE EXAM BY PATHOLOGIST: CPT | Performed by: OBSTETRICS & GYNECOLOGY

## 2018-12-06 PROCEDURE — G0463 HOSPITAL OUTPT CLINIC VISIT: HCPCS | Mod: 25

## 2018-12-06 ASSESSMENT — PAIN SCALES - GENERAL: PAINLEVEL: NO PAIN (0)

## 2018-12-06 NOTE — LETTER
"2018       RE: Mireille Walls  [address] [place] [address]     Dear Colleague,    Thank you for referring your patient, Mireille Walls, to the [facility] at [facility]. Please see a copy of my visit note below.    Mireille returns to discuss ultrasound results and for endometrial biopsy.  Patient had ultrasound given increased breakthrough bleeding on OCPs.  Ultrasound noted thickened endometrium given OCP use.  Additionally she had screening for Factor V Leiden due to family history and was found to be heterozygous.  She has since stopped the OCP.  She had some light bleeding after stopping.  She plans to wait and see how her menses go (history of dysmenorrhea) off the pill.  She is not sexually active and does not need contraception.  She agrees to endometrial biopsy today.    Endometrial Biopsy    Menstrual History:  Menstrual History 2015   LAST MENSTRUAL PERIOD - 10/26/2015 -   Menarche age 15 - 13   Period Cycle (Days) irregular - on continious birth control for dysmenorrhea  occ spottting   Period Duration (Days) 3-4 - -   Method of Contraception None - -   Period Pattern Irregular - Irregular   Menstrual Flow Light - Light   Menstrual Control Tampon - -   Dysmenorrhea Mild - None   PMS Symptoms Cramping - -   Reviewed Today Yes - Yes   Comments - - -       Time Out - \"Pause for the Cause\"  Just before the procedure begins, through verbal and active participation of team members, verify:                      Initials   Patient Name mip   Patient  mip   Procedure to be performed mip                                                                                                                                      Indication: Thickened endometrium, breakthrough bleeding on OCP  Faculty:      Pregnancy test: not done, not sexually active    Consent: Risks, benefits of treatment, and no treatment were discussed. "  Patient's questions were elicited and answered.  and Written consent signed and scanned into medical record.    Using a medium Graves speculum, the cervix was visualized. The cervix was prepped with Betadine.  No tenaculum was needed. The endometrial pipelle was advanced through the cervix to 7 cm without difficulty and a sample collected. No additional pass was made.  There was no significant bleeding    EBL: scant    Complications:  None apparent  Pathology: EMB sample was sent to pathology.  Tolerance of Procedure:  Patient did tolerate the procedure well.     Patient was instructed to call if she experiences any heavy bleeding, severe cramping, or abnormal vaginal discharge.  May take ibuprofen 400-800 mg PO TID PRN or naproxen 500 mg PO BID for cramping.    Will notify patient of results.  Plan to watch for abnormal bleeding or dysmenorrhea.  Advise against estrogen containing medications.  Will call to discuss contraception further if becomes sexually active.    Milagro Sandhu MD

## 2018-12-06 NOTE — MR AVS SNAPSHOT
After Visit Summary   12/6/2018    Mireille Walls    MRN: [id number]           Patient Information     Date Of Birth          1975        Visit Information        Provider Department      12/6/2018 10:15 AM Milagro Sandhu MD [facility]        Today's Diagnoses     Increased endometrial stripe thickness    -  1       Follow-ups after your visit        Follow-up notes from your care team     Return if symptoms worsen or fail to improve.      Who to contact     Please call your clinic at [phone] to:    Ask questions about your health    Make or cancel appointments    Discuss your medicines    Learn about your test results    Speak to your doctor            Additional Information About Your Visit        MyChart Information     [employer] gives you secure access to your electronic health record. If you see a primary care provider, you can also send messages to your care team and make appointments. If you have questions, please call your primary care clinic.  If you do not have a primary care provider, please call [phone] and they will assist you.      [employer] is an electronic gateway that provides easy, online access to your medical records. With [employer], you can request a clinic appointment, read your test results, renew a prescription or communicate with your care team.     To access your existing account, please contact your [facility] or call [phone] for assistance.        Care EveryWhere ID     This is your Care EveryWhere ID. This could be used by other organizations to access your [place] medical records  [id number]        Your Vitals Were     Pulse Breastfeeding? BMI (Body Mass Index)             76 No 30.04 kg/m2          Blood Pressure from Last 3 Encounters:   12/06/18 112/80   12/04/18 117/79   11/08/18 123/84    Weight from Last 3 Encounters:   12/06/18 79.4 kg (175 lb)   12/04/18 79.2 kg (174 lb 9.6 oz)   11/08/18 79.4  kg (175 lb)              We Performed the Following     Endometrial Biopsy without Cervical Dilation [[id number]]     Surgical pathology exam [[id number]]        Primary Care Provider Fax #    Physician No Ref-Primary [phone]       No address on file        Equal Access to Services     ZULEMA RANDY FINLEY: Reynaldo pollock juliann boaz Guzman, walisada luqadaha, qaybta kaalmada allen, karen vosscoral finley. So [facility] [phone].    ATENCIÓN: Si habla español, tiene a flores disposición servicios gratuitos de asistencia lingüística. Llame al [phone].    We comply with applicable federal civil rights laws and [place] laws. We do not discriminate on the basis of race, color, national origin, age, disability, sex, sexual orientation, or gender identity.            Thank you!     Thank you for choosing [facility]  for your care. Our goal is always to provide you with excellent care. Hearing back from our patients is one way we can continue to improve our services. Please take a few minutes to complete the written survey that you may receive in the mail after your visit with us. Thank you!             Your Updated Medication List - Protect others around you: Learn how to safely use, store and throw away your medicines at www.disposemymeds.org.          This list is accurate as of 12/6/18 11:59 PM.  Always use your most recent med list.                   Brand Name Dispense Instructions for use Diagnosis    CALCIUM PO      Take  by mouth.        CLARITIN 10 MG tablet   Generic drug:  loratadine      Take 10 mg by mouth daily        COD LIVER OIL PO      Take  by mouth.        D3 VITAMIN PO           ketoconazole 2 % external shampoo    NIZORAL    120 mL    Apply topically to scalp, lather, leave on for 3-5 minutes, then rinse.  Use ever other day    Dermatitis, seborrheic       MULTIPLE VITAMINS/WOMENS PO      Take  by mouth.        PROBIOTIC PO      Take  by mouth.        rizatriptan 5 MG  ODT    MAXALT-MLT    10 tablet    Take 1-2 tablets (5-10 mg) by mouth at onset of headache for migraine May repeat dose in 2 hours.  Do not exceed 30 mg in 24 hours    Migraine without aura and without status migrainosus, not intractable       TRINTELLIX 10 MG tablet   Generic drug:  vortioxetine      Take 1 tablet (10 mg) by mouth daily        WELLBUTRIN  MG 24 hr tablet   Generic drug:  buPROPion      Take 150 mg by mouth every morning.

## 2018-12-06 NOTE — PROGRESS NOTES
"Mireille returns to discuss ultrasound results and for endometrial biopsy.  Patient had ultrasound given increased breakthrough bleeding on OCPs.  Ultrasound noted thickened endometrium given OCP use.  Additionally she had screening for Factor V Leiden due to family history and was found to be heterozygous.  She has since stopped the OCP.  She had some light bleeding after stopping.  She plans to wait and see how her menses go (history of dysmenorrhea) off the pill.  She is not sexually active and does not need contraception.  She agrees to endometrial biopsy today.    Endometrial Biopsy    Menstrual History:  Menstrual History 2015   LAST MENSTRUAL PERIOD - 10/26/2015 -   Menarche age 15 - 13   Period Cycle (Days) irregular - on continious birth control for dysmenorrhea  occ spottting   Period Duration (Days) 3-4 - -   Method of Contraception None - -   Period Pattern Irregular - Irregular   Menstrual Flow Light - Light   Menstrual Control Tampon - -   Dysmenorrhea Mild - None   PMS Symptoms Cramping - -   Reviewed Today Yes - Yes   Comments - - -       Time Out - \"Pause for the Cause\"  Just before the procedure begins, through verbal and active participation of team members, verify:                      Initials   Patient Name mip   Patient  mip   Procedure to be performed mip                                                                                                                                      Indication: Thickened endometrium, breakthrough bleeding on OCP  Faculty:      Pregnancy test: not done, not sexually active    Consent: Risks, benefits of treatment, and no treatment were discussed.  Patient's questions were elicited and answered.  and Written consent signed and scanned into medical record.    Using a medium Graves speculum, the cervix was visualized. The cervix was prepped with Betadine.  No tenaculum was needed. The endometrial pipelle was advanced through the cervix " to 7 cm without difficulty and a sample collected. No additional pass was made.  There was no significant bleeding    EBL: scant    Complications:  None apparent  Pathology: EMB sample was sent to pathology.  Tolerance of Procedure:  Patient did tolerate the procedure well.     Patient was instructed to call if she experiences any heavy bleeding, severe cramping, or abnormal vaginal discharge.  May take ibuprofen 400-800 mg PO TID PRN or naproxen 500 mg PO BID for cramping.    Will notify patient of results.  Plan to watch for abnormal bleeding or dysmenorrhea.  Advise against estrogen containing medications.  Will call to discuss contraception further if becomes sexually active.    Milagro Sandhu MD

## 2018-12-09 ASSESSMENT — ENCOUNTER SYMPTOMS
FATIGUE: 0
PANIC: 0
ABDOMINAL PAIN: 0
POLYPHAGIA: 0
COUGH: 0
SHORTNESS OF BREATH: 0
FEVER: 0
LOSS OF CONSCIOUSNESS: 0
SWOLLEN GLANDS: 0
DYSPNEA ON EXERTION: 0
DECREASED CONCENTRATION: 0
DIARRHEA: 0
CONSTIPATION: 0
BACK PAIN: 0
BRUISES/BLEEDS EASILY: 0
ARTHRALGIAS: 0
HEADACHES: 0
ALTERED TEMPERATURE REGULATION: 0
NAUSEA: 0
SINUS CONGESTION: 0
POSTURAL DYSPNEA: 0
CHILLS: 0
INSOMNIA: 0
DEPRESSION: 0
TREMORS: 0
VOMITING: 0
NERVOUS/ANXIOUS: 0
POLYDIPSIA: 0

## 2018-12-11 LAB — COPATH REPORT: NORMAL

## 2018-12-29 DIAGNOSIS — G43.009 MIGRAINE WITHOUT AURA AND WITHOUT STATUS MIGRAINOSUS, NOT INTRACTABLE: ICD-10-CM

## 2018-12-31 RX ORDER — RIZATRIPTAN BENZOATE 5 MG/1
5-10 TABLET, ORALLY DISINTEGRATING ORAL
Qty: 10 TABLET | Refills: 1 | Status: SHIPPED | OUTPATIENT
Start: 2018-12-31

## 2019-01-28 ENCOUNTER — OFFICE VISIT (OUTPATIENT)
Dept: DERMATOLOGY | Facility: CLINIC | Age: 44
End: 2019-01-28
Payer: COMMERCIAL

## 2019-01-28 DIAGNOSIS — L21.9 DERMATITIS, SEBORRHEIC: Primary | ICD-10-CM

## 2019-01-28 DIAGNOSIS — D22.5 MELANOCYTIC NEVUS OF TRUNK: ICD-10-CM

## 2019-01-28 RX ORDER — KETOCONAZOLE 20 MG/ML
SHAMPOO TOPICAL
Qty: 120 ML | Refills: 6 | Status: SHIPPED | OUTPATIENT
Start: 2019-01-29 | End: 2020-06-03

## 2019-01-28 RX ORDER — ARIPIPRAZOLE 5 MG/1
TABLET ORAL
COMMUNITY
Start: 2018-12-01 | End: 2020-02-21

## 2019-01-28 ASSESSMENT — PAIN SCALES - GENERAL: PAINLEVEL: NO PAIN (0)

## 2019-01-28 NOTE — LETTER
1/28/2019       RE: Mireille Walls  [address] [place] [address]     Dear Colleague,    Thank you for referring your patient, Mireille Walls, to the [facility] DERMATOLOGY at [facility]. Please see a copy of my visit note below.    [facility] Dermatology Note      Dermatology Problem List:    Specialty Problems        Dermatology Diagnoses    Diaphoresis        Acne        Hair loss            CC:   Skin Check (Mireille is here for skin check.Spot on back of her arm and chest.)    Encounter Date: Jan 28, 2019    History of Present Illness:  Ms. Mireille Walls is a 44 year old female who presents as a referral from Self.    Many freckles since young and get easily burned by sun. Therefore never exposed much to sun    Past Medical History:   Patient Active Problem List   Diagnosis     Depression     OCD (obsessive compulsive disorder)     IBS (irritable bowel syndrome)     Migraines     Hyperlipidemia     Contraception -- abstinence     Diaphoresis     Plantar fascial fibromatosis     Acne     Hair loss     Anxiety associated with depression     Right-sided low back pain without sciatica     Past Medical History:   Diagnosis Date     Abnormal Pap smear 2006?    dysplasia  X 1; paps OK since then...     Arm DVT (deep venous thromboembolism), acute (H) 2008    hx with phlebitis in IVs after a surgery     Breast disorder 2016    2D mammo, then 3D & u/s to r/o = scar tissue from reduction     Cholesterol serum elevated     runs in family     Complication of anesthesia 2000?, 2016    very slow to awake from both gen. anesth. & conscious sedati     Depression      Encounter for insertion of mirena IUD 2011    D/C'd w increased acne     Hypertension 2017&2018    jamin. during exercise, caused by stimulant for depression?     IBS (irritable bowel syndrome) 2002    under control, better with probiotic     Menarche age 15    cycles q 1-2 months Xs 3-4 d w bad cramps      Migraines 2008    a lot better now, may be stress related     OCD (obsessive compulsive disorder)      Seasonal allergies      Allergy History:     Allergies   Allergen Reactions     Phenergan Vc [Promethazine Vc] Visual Disturbance     Social History:     reports that  has never smoked. she has never used smokeless tobacco. She reports that she does not drink alcohol or use drugs.    Family History:  Family History   Problem Relation Age of Onset     Cancer Father 57        bladder ca     Genitourinary Problems Father         Polycystic kidney     Other Cancer Father         bladder cancer     Genetic Disorder Father         polycystic kidneys     Cancer - colorectal Paternal Grandmother 75     Colon Cancer Paternal Grandmother      Mental Illness Other         SeeBelow     Cancer Mother         skin cancer     Hyperlipidemia Mother         runs in Mom's family-several     Other Cancer Mother         skin cancer     Asthma Mother         runs in Mom's family     Skin Cancer Mother      Alcohol/Drug Paternal Aunt         Xs 2     Thyroid Disease Maternal Grandmother         hypothyroid     Obesity Maternal Grandmother      Coronary Artery Disease Maternal Grandfather         heart attack     Mental Illness Other         Schizophrenia     Mental Illness Cousin         OCD     Depression Other         chronic and episodic     Mental Illness Other         ChronicMajorDepression     Anxiety Disorder Other         gen. anxiety disorder     Mental Illness Other         OCD     Substance Abuse Other         alcoholism-runs in Dad's family     Genetic Disorder Other         polycystic kidneys     Genetic Disorder Brother         [id number]     Asthma Brother      Deep Vein Thrombosis (DVT) Brother      Genetic Disorder Other         [id number]     C.A.D. No family hx of      Hypertension No family hx of      Breast Cancer No family hx of      Medications:  Current Outpatient Medications   Medication Sig Dispense  Refill     ARIPiprazole (ABILIFY) 5 MG tablet Pt takes half       buPROPion (WELLBUTRIN XL) 150 MG 24 hr tablet Take 150 mg by mouth every morning.       CALCIUM PO Take  by mouth.       Cholecalciferol (D3 VITAMIN PO)        COD LIVER OIL PO Take  by mouth.       ketoconazole (NIZORAL) 2 % shampoo Apply topically to scalp, lather, leave on for 3-5 minutes, then rinse.  Use ever other day 120 mL 12     loratadine (CLARITIN) 10 MG tablet Take 10 mg by mouth daily       Multiple Vitamins-Minerals (MULTIPLE VITAMINS/WOMENS PO) Take  by mouth.       Probiotic Product (PROBIOTIC PO) Take  by mouth.       rizatriptan (MAXALT-MLT) 5 MG ODT Take 1-2 tablets (5-10 mg) by mouth at onset of headache for migraine May repeat dose in 2 hours.  Do not exceed 30 mg in 24 hours 10 tablet 1     vortioxetine (TRINTELLIX) 10 MG tablet Take 1 tablet (10 mg) by mouth daily       Review of Systems:  -As per HPI  -Constitutional: The patient denies fatigue, fevers, chills, unintended weight loss, and night sweats.  -HEENT: Patient denies nonhealing oral sores.  -Skin: As above in HPI. No additional skin concerns.    Physical exam:  Vitals: There were no vitals taken for this visit.  GEN: This is a well developed, well-nourished female in no acute distress, in a pleasant mood.    SKIN: Full skin, which includes the head/face, both arms, chest, back, abdomen,both legs, genitalia and/or groin buttocks, digits and/or nails, was examined.  On entire body freckles, not only on sun exposed areas  Right breast a 3mm diameter pigmented lesion with some assymmetry. However, in dermatoscope some features of a seborrhoic keratosis ==> observe and if really bigger, then do biopsy  Many unsuspicious moles all over the body, most of them regularly light brown  desqumation on scalp    -No other lesions of concern on areas examined.     Impression/Plan:    1) Melanocytic naevi    Observe, particularly that one on right breast (maybe make photo) --> if any  changes, then biopsy    2) seborrhoic dermatitis scalp    Ketokonazole Shampoo    Follow-up in 1-2 years    Again, thank you for allowing me to participate in the care of your patient.      Sincerely,    Jeff Roca MD

## 2019-01-28 NOTE — PROGRESS NOTES
[facility] Dermatology Note      Dermatology Problem List:    Specialty Problems        Dermatology Diagnoses    Diaphoresis        Acne        Hair loss              CC:   Skin Check (Mireille is here for skin check.Spot on back of her arm and chest.)        Encounter Date: Jan 28, 2019    History of Present Illness:  Ms. Mireille Walls is a 44 year old female who presents as a referral from Self.    Many freckles since young and get easily burned by sun. Therefore never exposed much to sun    Past Medical History:   Patient Active Problem List   Diagnosis     Depression     OCD (obsessive compulsive disorder)     IBS (irritable bowel syndrome)     Migraines     Hyperlipidemia     Contraception -- abstinence     Diaphoresis     Plantar fascial fibromatosis     Acne     Hair loss     Anxiety associated with depression     Right-sided low back pain without sciatica     Past Medical History:   Diagnosis Date     Abnormal Pap smear 2006?    dysplasia  X 1; paps OK since then...     Arm DVT (deep venous thromboembolism), acute (H) 2008    hx with phlebitis in IVs after a surgery     Breast disorder 2016    2D mammo, then 3D & u/s to r/o = scar tissue from reduction     Cholesterol serum elevated     runs in family     Complication of anesthesia 2000?, 2016    very slow to awake from both gen. anesth. & conscious sedati     Depression      Encounter for insertion of mirena IUD 2011    D/C'd w increased acne     Hypertension 2017&2018    jaimn. during exercise, caused by stimulant for depression?     IBS (irritable bowel syndrome) 2002    under control, better with probiotic     Menarche age 15    cycles q 1-2 months Xs 3-4 d w bad cramps     Migraines 2008    a lot better now, may be stress related     OCD (obsessive compulsive disorder)      Seasonal allergies        Allergy History:     Allergies   Allergen Reactions     Phenergan Vc [Promethazine Vc] Visual Disturbance       Social History:       reports  that  has never smoked. she has never used smokeless tobacco. She reports that she does not drink alcohol or use drugs.      Family History:  Family History   Problem Relation Age of Onset     Cancer Father 57        bladder ca     Genitourinary Problems Father         Polycystic kidney     Other Cancer Father         bladder cancer     Genetic Disorder Father         polycystic kidneys     Cancer - colorectal Paternal Grandmother 75     Colon Cancer Paternal Grandmother      Mental Illness Other         SeeBelow     Cancer Mother         skin cancer     Hyperlipidemia Mother         runs in Mom's family-several     Other Cancer Mother         skin cancer     Asthma Mother         runs in Mom's family     Skin Cancer Mother      Alcohol/Drug Paternal Aunt         Xs 2     Thyroid Disease Maternal Grandmother         hypothyroid     Obesity Maternal Grandmother      Coronary Artery Disease Maternal Grandfather         heart attack     Mental Illness Other         Schizophrenia     Mental Illness Cousin         OCD     Depression Other         chronic and episodic     Mental Illness Other         ChronicMajorDepression     Anxiety Disorder Other         gen. anxiety disorder     Mental Illness Other         OCD     Substance Abuse Other         alcoholism-runs in Dad's family     Genetic Disorder Other         polycystic kidneys     Genetic Disorder Brother         [id number]     Asthma Brother      Deep Vein Thrombosis (DVT) Brother      Genetic Disorder Other         [id number]     C.A.D. No family hx of      Hypertension No family hx of      Breast Cancer No family hx of        Medications:  Current Outpatient Medications   Medication Sig Dispense Refill     ARIPiprazole (ABILIFY) 5 MG tablet Pt takes half       buPROPion (WELLBUTRIN XL) 150 MG 24 hr tablet Take 150 mg by mouth every morning.       CALCIUM PO Take  by mouth.       Cholecalciferol (D3 VITAMIN PO)        COD LIVER OIL PO Take  by mouth.        ketoconazole (NIZORAL) 2 % shampoo Apply topically to scalp, lather, leave on for 3-5 minutes, then rinse.  Use ever other day 120 mL 12     loratadine (CLARITIN) 10 MG tablet Take 10 mg by mouth daily       Multiple Vitamins-Minerals (MULTIPLE VITAMINS/WOMENS PO) Take  by mouth.       Probiotic Product (PROBIOTIC PO) Take  by mouth.       rizatriptan (MAXALT-MLT) 5 MG ODT Take 1-2 tablets (5-10 mg) by mouth at onset of headache for migraine May repeat dose in 2 hours.  Do not exceed 30 mg in 24 hours 10 tablet 1     vortioxetine (TRINTELLIX) 10 MG tablet Take 1 tablet (10 mg) by mouth daily             Review of Systems:  -As per HPI  -Constitutional: The patient denies fatigue, fevers, chills, unintended weight loss, and night sweats.  -HEENT: Patient denies nonhealing oral sores.  -Skin: As above in HPI. No additional skin concerns.    Physical exam:  Vitals: There were no vitals taken for this visit.  GEN: This is a well developed, well-nourished female in no acute distress, in a pleasant mood.    SKIN: Full skin, which includes the head/face, both arms, chest, back, abdomen,both legs, genitalia and/or groin buttocks, digits and/or nails, was examined.  On entire body freckles, not only on sun exposed areas  Right breast a 3mm diameter pigmented lesion with some assymmetry. However, in dermatoscope some features of a seborrhoic keratosis ==> observe and if really bigger, then do biopsy  Many unsuspicious moles all over the body, most of them regularly light brown  desqumation on scalp    -No other lesions of concern on areas examined.     Impression/Plan:    1) Melanocytic naevi    Observe, particularly that one on right breast (maybe make photo) --> if any changes, then biopsy    2) seborrhoic dermatitis scalp    Ketokonazole Shampoo        Follow-up in 1-2 years

## 2019-01-28 NOTE — NURSING NOTE
Dermatology Rooming Note    Mireille Walls's goals for this visit include:   Chief Complaint   Patient presents with     Skin Check     Mireille is here for skin check.Spot on back of her arm and chest.     Lary Carlisle, CMA

## 2019-06-24 ENCOUNTER — PRE VISIT (OUTPATIENT)
Dept: SLEEP MEDICINE | Facility: CLINIC | Age: 44
End: 2019-06-24

## 2019-06-24 NOTE — TELEPHONE ENCOUNTER
"Follow up from previous visit to discuss sleep issues.      1.  Reason for the visit:  Discuss Hyporsomnia  2.  Referring provider and clinic name:  Dr. Farhad Manuel  3.  Previous Sleep Doctor or Pulmonlogist (clinic name)?  Sleep [facility]  4.  Records, Procedures, Imaging, and Labs (see below)  KULWANT needed        All NOTES from previous office visits that pertain to why they are being seen in the Sleep Center    Previous Sleep Studies, Chest CT, Echos and reports that pertain to why they are seeing Sleep Center    All Sleep records that have been done in the last 2 years that pertain to why they are seeing Sleep Center            Are they being seen for continuation of care for Cpap/Bipap/Avap/Trilogy/Dental Device? None    If yes to above Who and Where was Device issued/currently getting supplies from? na    Are you currently on \"Supplemental Oxygen\" during the day or night?   na                                                                                                                                                      Please remind pt to bring Cpap machine and ask to arrive 15 minutes early to appointment due traffic and congestion                                                 5. Pt Sleep Center Packet received Message left asking pt to arrive 30 minutes early to appointment if no packet received.        Yes: \"please make sure that you bring this to your appointment completed, either the doctor will not see you until this completed or you may be asked to reschedule your appointment.\"     No: mail or email to the pt and explain, \"please make sure that you bring this to your appointment completed, either the doctor will not see you until this completed or you may be asked to reschedule your appointment.\"     ~If pt coming early to fill packet out, ask that they come 30 minutes prior to their appointment~     6. Has the pt's medication list been updated and preferred pharmacy added?     7. Has " "the allergy list been reviewed?    \"Thank you for choosing [facility] and we look forward to seeing you at your upcoming appointment\"     "

## 2019-07-11 ENCOUNTER — OFFICE VISIT (OUTPATIENT)
Dept: DERMATOLOGY | Facility: CLINIC | Age: 44
End: 2019-07-11
Payer: COMMERCIAL

## 2019-07-11 DIAGNOSIS — L82.0 INFLAMED SEBORRHEIC KERATOSIS: ICD-10-CM

## 2019-07-11 DIAGNOSIS — L30.0 NUMMULAR DERMATITIS: Primary | ICD-10-CM

## 2019-07-11 RX ORDER — HYDROCORTISONE 2.5 %
CREAM (GRAM) TOPICAL 2 TIMES DAILY
Qty: 30 G | Refills: 1 | Status: SHIPPED | OUTPATIENT
Start: 2019-07-11 | End: 2022-04-20

## 2019-07-11 ASSESSMENT — PAIN SCALES - GENERAL: PAINLEVEL: NO PAIN (0)

## 2019-07-11 NOTE — PATIENT INSTRUCTIONS
Cryotherapy    What is it?    Use of a very cold liquid, such as liquid nitrogen, to freeze and destroy abnormal skin cells that need to be removed    What should I expect?    Tenderness and redness    A small blister that might grow and fill with dark purple blood. There may be crusting.    More than one treatment may be needed if the lesions do not go away.    How do I care for the treated area?    Gently wash the area with your hands when bathing.    Use a thin layer of Vaseline to help with healing. You may use a Band-Aid.     The area should heal within 7-10 days and may leave behind a pink or lighter color.     Do not use an antibiotic or Neosporin ointment.     You may take acetaminophen (Tylenol) for pain.     Call your Doctor if you have:    Severe pain    Signs of infection (warmth, redness, cloudy yellow drainage, and or a bad smell)    Questions or concerns    Who should I call with questions?       [facility]: [phone]       [facility]: [phone]       For urgent needs outside of business hours call the [facility] at [phone]        and ask for the dermatology resident on call    We will see you back in 4-6 weeks. Thank you!!

## 2019-07-11 NOTE — NURSING NOTE
Dermatology Rooming Note    Mireille Walls's goals for this visit include:   Chief Complaint   Patient presents with     Derm Problem     Mireille  is here for redness onher forehead and left arm.     Lary Carlisle, CMA

## 2019-07-11 NOTE — LETTER
7/11/2019       RE: Mireille Walls  [address] [place] [address]     Dear Colleague,    Thank you for referring your patient, Mireille Walls, to the [facility] DERMATOLOGY at [facility]. Please see a copy of my visit note below.    [facility] Dermatology Note      Dermatology Problem List:    [facility] patient of Dr. Mulugeta Hubbard  1.  Seborrheic dermatitis: Ketoconazole shampoo 3 times weekly  2.  Nummular dermatitis of the central forehead with a negative KOH scraping on July 11, 2019: Hydrocortisone 2.5% cream twice daily for 2 to 3 weeks  3.  Irritated seborrheic keratosis of the left forearm status post cryotherapy on July 11, 2018    Encounter Date: Jul 11, 2019    CC:   Chief Complaint   Patient presents with     Derm Problem     Mireille  is here for redness onher forehead and left arm.       History of Present Illness:  Ms. Mireille Walls is a 44 year old female who presents for evaluation of a new rash of the central forehead.  She says rash onset was roughly 2 to 3 months ago.  It is not associated with any symptoms.  She notes that it is red, scaly but does not weep.  She reports a history of seasonal allergies, but otherwise denies atopy.  She has not used any medications for the rash.  She uses Neutrogena foundation almost daily.  She uses ketoconazole shampoo for seborrheic dermatitis 2-3 times a week.  She denies rash elsewhere on the body.  She also notes today at the left forearm a roughly 6 mm stuck on papule that is irritated and wonders if this could be treated somehow.  She denies any other dermatologic problems or constitutional symptoms..     Past Medical History:   Patient Active Problem List   Diagnosis     Depression     OCD (obsessive compulsive disorder)     IBS (irritable bowel syndrome)     Migraines     Hyperlipidemia     Contraception -- abstinence     Diaphoresis     Plantar fascial fibromatosis     Acne     Hair  loss     Anxiety associated with depression     Right-sided low back pain without sciatica     Past Medical History:   Diagnosis Date     Abnormal Pap smear 2006?    dysplasia  X 1; paps OK since then...     Arm DVT (deep venous thromboembolism), acute (H) 2008    hx with phlebitis in IVs after a surgery     Breast disorder 2016    2D mammo, then 3D & u/s to r/o = scar tissue from reduction     Cholesterol serum elevated     runs in family     Complication of anesthesia 2000?, 2016    very slow to awake from both gen. anesth. & conscious sedati     Depression      Encounter for insertion of mirena IUD 2011    D/C'd w increased acne     Hypertension 2017&2018    jamin. during exercise, caused by stimulant for depression?     IBS (irritable bowel syndrome) 2002    under control, better with probiotic     Menarche age 15    cycles q 1-2 months Xs 3-4 d w bad cramps     Migraines 2008    a lot better now, may be stress related     OCD (obsessive compulsive disorder)      Seasonal allergies      Past Surgical History:   Procedure Laterality Date     BIOPSY  2000    dermatology-moles     HEAD & NECK SURGERY  1990?, 2016    wisdom teeth extracted, gum tissue grafting/oral surgery     LASIK Bilateral 2008     MAMMOPLASTY REDUCTION BILATERAL  1197       Social History:  Patient reports that she has never smoked. She has never used smokeless tobacco. She reports that she does not drink alcohol or use drugs.    Family History:  Family History   Problem Relation Age of Onset     Cancer Father 57        bladder ca     Genitourinary Problems Father         Polycystic kidney     Other Cancer Father         bladder cancer     Genetic Disorder Father         polycystic kidneys     Cancer - colorectal Paternal Grandmother 75     Colon Cancer Paternal Grandmother      Mental Illness Other         SeeBelow     Cancer Mother         skin cancer     Hyperlipidemia Mother         runs in Mom's family-several     Other Cancer Mother          skin cancer     Asthma Mother         runs in Mom's family     Skin Cancer Mother      Alcohol/Drug Paternal Aunt         Xs 2     Thyroid Disease Maternal Grandmother         hypothyroid     Obesity Maternal Grandmother      Coronary Artery Disease Maternal Grandfather         heart attack     Mental Illness Other         Schizophrenia     Mental Illness Cousin         OCD     Depression Other         chronic and episodic     Mental Illness Other         ChronicMajorDepression     Anxiety Disorder Other         gen. anxiety disorder     Mental Illness Other         OCD     Substance Abuse Other         alcoholism-runs in Dad's family     Genetic Disorder Other         polycystic kidneys     Genetic Disorder Brother         [id number]     Asthma Brother      Deep Vein Thrombosis (DVT) Brother      Genetic Disorder Other         [id number]     C.A.D. No family hx of      Hypertension No family hx of      Breast Cancer No family hx of        Medications:  Current Outpatient Medications   Medication Sig Dispense Refill     ARIPiprazole (ABILIFY) 5 MG tablet Pt takes half       buPROPion (WELLBUTRIN XL) 150 MG 24 hr tablet Take 150 mg by mouth every morning.       CALCIUM PO Take  by mouth.       Cholecalciferol (D3 VITAMIN PO)        COD LIVER OIL PO Take  by mouth.       ketoconazole (NIZORAL) 2 % external shampoo Apply topically three times a week 120 mL 6     loratadine (CLARITIN) 10 MG tablet Take 10 mg by mouth daily       Multiple Vitamins-Minerals (MULTIPLE VITAMINS/WOMENS PO) Take  by mouth.       Probiotic Product (PROBIOTIC PO) Take  by mouth.       rizatriptan (MAXALT-MLT) 5 MG ODT Take 1-2 tablets (5-10 mg) by mouth at onset of headache for migraine May repeat dose in 2 hours.  Do not exceed 30 mg in 24 hours 10 tablet 1     vortioxetine (TRINTELLIX) 10 MG tablet Take 1 tablet (10 mg) by mouth daily       ketoconazole (NIZORAL) 2 % shampoo Apply topically to scalp, lather, leave on for 3-5 minutes,  then rinse.  Use ever other day (Patient not taking: Reported on 7/11/2019) 120 mL 12        Allergies   Allergen Reactions     Phenergan Vc [Promethazine Vc] Visual Disturbance         Review of Systems:  -Skin Establ Pt: The patient denies any new rash, pruritus, or lesions that are symptomatic, changing or bleeding, except as per HPI.  -Constitutional: Otherwise feeling well today, in usual state of health.  -HEENT: Patient denies nonhealing oral sores.  -Skin: As above in HPI. No additional skin concerns.    Physical exam:  Vitals: There were no vitals taken for this visit.  GEN: This is a well developed, well-nourished female in no acute distress, in a pleasant mood.    SKIN: Waist-up skin, which includes the head/face, neck, both arms, chest, back, abdomen, digits and/or nails was examined.  -At the central forehead, there is a roughly 2 to 3 cm erythematous and scaly thin plaque.  There is no rash on other areas examined.  There are no nail changes.  - Scalp scale noted.  -At the left dorsal forearm, there is a 7 mm light brown stuck on papule that appears irritated.  -No other lesions of concern on areas examined.  -KOH scraping of central forehead rash was negative for fungus (I reviewed the KOH and it was negative.  KB)    Impression/Plan:  1. Rash at central forehead    The differential diagnosis includes nummular dermatitis versus seborrheic dermatitis. We are less suspicious for tinea faceii given the negative KOH scraping.  We will treat her for nummular dermatitis or seborrheic dermatitis with a topical steroid.    Ordered hydrocortisone 2.5% cream twice daily for up to 2 to 3 weeks    Return to clinic in 4 to 6 weeks for reevaluation    2. Seborrheic keratosis, inflamed, left dorsal forearm    Cryotherapy procedure note: After verbal consent and discussion of risks and benefits including but no limited to dyspigmentation/scar, blister, and pain, 1 lesion was treated with 1-2mm freeze border for 2  cycles with liquid nitrogen. Post cryotherapy instructions were provided.    CC Referred Butch, MD  No address on file on close of this encounter.  Follow-up in 4-6 weeks, earlier for new or changing lesions.     Dr. Lucas staffed the patient.    Staff Involved:  Resident(Dr. Mulugeta Hubbard)/Staff  .I, Melanie Lucas MD, saw this patient with the resident and agree with the resident s findings and plan of care as documented in the resident s note.    Again, thank you for allowing me to participate in the care of your patient.      Sincerely,    Magdiel Hubbard MD

## 2019-07-13 ENCOUNTER — OFFICE VISIT (OUTPATIENT)
Dept: URGENT CARE | Facility: URGENT CARE | Age: 44
End: 2019-07-13
Payer: COMMERCIAL

## 2019-07-13 VITALS
WEIGHT: 182.8 LBS | BODY MASS INDEX: 31.38 KG/M2 | SYSTOLIC BLOOD PRESSURE: 111 MMHG | DIASTOLIC BLOOD PRESSURE: 79 MMHG | TEMPERATURE: 98.1 F | OXYGEN SATURATION: 96 % | HEART RATE: 73 BPM

## 2019-07-13 DIAGNOSIS — H66.011 NON-RECURRENT ACUTE SUPPURATIVE OTITIS MEDIA OF RIGHT EAR WITH SPONTANEOUS RUPTURE OF TYMPANIC MEMBRANE: Primary | ICD-10-CM

## 2019-07-13 DIAGNOSIS — H61.21 IMPACTED CERUMEN OF RIGHT EAR: ICD-10-CM

## 2019-07-13 PROCEDURE — 99213 OFFICE O/P EST LOW 20 MIN: CPT | Mod: 25 | Performed by: NURSE PRACTITIONER

## 2019-07-13 PROCEDURE — 69209 REMOVE IMPACTED EAR WAX UNI: CPT | Mod: RT | Performed by: NURSE PRACTITIONER

## 2019-07-13 RX ORDER — AMOXICILLIN 500 MG/1
500 CAPSULE ORAL 2 TIMES DAILY
Qty: 20 CAPSULE | Refills: 0 | Status: SHIPPED | OUTPATIENT
Start: 2019-07-13 | End: 2019-07-30

## 2019-07-13 ASSESSMENT — ENCOUNTER SYMPTOMS
WHEEZING: 0
RHINORRHEA: 0
FATIGUE: 0
APPETITE CHANGE: 0
ACTIVITY CHANGE: 0
COUGH: 0
SORE THROAT: 0
MUSCULOSKELETAL NEGATIVE: 1
FEVER: 0
SHORTNESS OF BREATH: 0
CHILLS: 0

## 2019-07-13 NOTE — PATIENT INSTRUCTIONS
Patient Education     Middle Ear Infection (Adult)  You have an infection of the middle ear, the space behind the eardrum. This is also called acute otitis media (AOM). Sometimes it is caused by the common cold. This is because congestion can block the internal passage (eustachian tube) that drains fluid from the middle ear. When the middle ear fills with fluid, bacteria can grow there and cause an infection. Oral antibiotics are used to treat this illness, not ear drops. Symptoms usually start to improve within 1 to 2 days of treatment.    Home care  The following are general care guidelines:    Finish all of the antibiotic medicine given, even though you may feel better after the first few days.    You may use over-the-counter medicine, such as acetaminophen or ibuprofen, to control pain and fever, unless something else was prescribed. If you have chronic liver or kidney disease or have ever had a stomach ulcer or gastrointestinal bleeding, talk with your healthcare provider before using these medicines. Do not give aspirin to anyone under 18 years of age who has a fever. It may cause severe illness or death.  Follow-up care  Follow up with your healthcare provider, or as advised, in 2 weeks if all symptoms have not gotten better, or if hearing doesn't go back to normal within 1 month.  When to seek medical advice  Call your healthcare provider right away if any of these occur:    Ear pain gets worse or does not improve after 3 days of treatment    Unusual drowsiness or confusion    Neck pain, stiff neck, or headache    Fluid or blood draining from the ear canal    Fever of 100.4 F (38 C) or as advised     Seizure  Date Last Reviewed: 6/1/2016 2000-2018 [employer]. [address] [place] [address], [place] [address]. All rights reserved. This information is not intended as a substitute for professional medical care. Always follow your healthcare professional's instructions.           Patient Education        Earwax, Home Treatment    Everyone produces earwax from the lining of the ear canal. It serves to lubricate and protect the ear. The wax that forms in the canal naturally moves toward the outside of the ear and falls out. Sometimes the ear canal may contain too much wax. This can cause a blockage and loss of hearing. Directions are given below for home treatment.  Home care  If your doctor has advised you to remove a wax blockage yourself, follow these directions:    Unless a medicine was prescribed, you may use an over-the-counter product made for clearing earwax. These contain carbamide peroxide. Lie down with the blocked ear facing upward. Apply one dropper full of medicine and wait a few minutes. Grasp the outer ear and wiggle it to help the solution enter the canal.    Lean over a sink or basin with the blocked ear facing downward. Use a bulb syringe filled with warm (not hot or cold) water to rinse the ear several times. Use gentle pressure only.    If you are having trouble draining the water out of your ear canal, put a few drops of rubbing alcohol (isopropyl alcohol) into the ear canal. This will help remove the remaining water.    Repeat this procedure once a day for up to three days, or until your hearing is back to normal. Do not use this treatment for more than three days in a row.  Don ts    Don t use cold water to rinse the ear. This will make you dizzy.    Don t perform this procedure if you have an ear infection.    Don t perform this procedure if you have a ruptured eardrum.    Don t use cotton swabs, matches, hairpins, keys, or other objects to  clean  the ear canal. This can cause infection of the ear canal or rupture the eardrum. Because of their size and shape, cotton swabs can push earwax deeper into the ear canal instead of removing it.  Follow-up care  Follow up with your health care provider if you are not improving after three cleaning attempts, or as advised.  When to seek medical  advice  Call your health care provider right away if any of these occur:    Worsening ear pain    Fever of 101 F (38.3 C) or higher, or as directed by your health care provider    Hearing does not return to normal after three days of treatment    Fluid drainage or bleeding from the ear canal    Swelling, redness, or tenderness of the outer ear    Headache, neck pain, or stiff neck    7185-3334 [employer]. [address] [place] [address]. All rights reserved. This information is not intended as a substitute for professional medical care. Always follow your healthcare professional's instructions.

## 2019-07-13 NOTE — PROGRESS NOTES
SUBJECTIVE:   Mireille Walls is a 44 year old female presenting with a chief complaint of   Chief Complaint   Patient presents with     Otalgia     Patient complains of pressure in right ear        She is an established patient of [facility].    URI Adult    Onset of symptoms was today  Course of illness is worsening  Severity moderate  Current and Associated symptoms: ear pain RIGHT  Treatment measures tried include Debrox OTC with minimal relief and Claritin daily  Predisposing factors include seasonal allergies.  Reports no changes in activity level, appetite, sleep, bowel or bladder habits.      Review of Systems   Constitutional: Negative for activity change, appetite change, chills, fatigue and fever.   HENT: Positive for congestion and ear pain. Negative for postnasal drip, rhinorrhea and sore throat.    Respiratory: Negative for cough, shortness of breath and wheezing.    Musculoskeletal: Negative.    Skin: Negative.    All other systems reviewed and are negative.      Past Medical History:   Diagnosis Date     Abnormal Pap smear 2006?    dysplasia  X 1; paps OK since then...     Arm DVT (deep venous thromboembolism), acute (H) 2008    hx with phlebitis in IVs after a surgery     Breast disorder 2016    2D mammo, then 3D & u/s to r/o = scar tissue from reduction     Cholesterol serum elevated     runs in family     Complication of anesthesia 2000?, 2016    very slow to awake from both gen. anesth. & conscious sedati     Depression      Encounter for insertion of mirena IUD 2011    D/C'd w increased acne     Hypertension 2017&2018    jamin. during exercise, caused by stimulant for depression?     IBS (irritable bowel syndrome) 2002    under control, better with probiotic     Menarche age 15    cycles q 1-2 months Xs 3-4 d w bad cramps     Migraines 2008    a lot better now, may be stress related     OCD (obsessive compulsive disorder)      Seasonal allergies      Family History   Problem Relation Age of Onset      Cancer Father 57        bladder ca     Genitourinary Problems Father         Polycystic kidney     Other Cancer Father         bladder cancer     Genetic Disorder Father         polycystic kidneys     Cancer - colorectal Paternal Grandmother 75     Colon Cancer Paternal Grandmother      Mental Illness Other         SeeBelow     Cancer Mother         skin cancer     Hyperlipidemia Mother         runs in Mom's family-several     Other Cancer Mother         skin cancer     Asthma Mother         runs in Mom's family     Skin Cancer Mother      Alcohol/Drug Paternal Aunt         Xs 2     Thyroid Disease Maternal Grandmother         hypothyroid     Obesity Maternal Grandmother      Coronary Artery Disease Maternal Grandfather         heart attack     Mental Illness Other         Schizophrenia     Mental Illness Cousin         OCD     Depression Other         chronic and episodic     Mental Illness Other         ChronicMajorDepression     Anxiety Disorder Other         gen. anxiety disorder     Mental Illness Other         OCD     Substance Abuse Other         alcoholism-runs in Dad's family     Genetic Disorder Other         polycystic kidneys     Genetic Disorder Brother         [id number]     Asthma Brother      Deep Vein Thrombosis (DVT) Brother      Genetic Disorder Other         [id number]     C.A.D. No family hx of      Hypertension No family hx of      Breast Cancer No family hx of      Current Outpatient Medications   Medication Sig Dispense Refill     amoxicillin (AMOXIL) 500 MG capsule Take 1 capsule (500 mg) by mouth 2 times daily 20 capsule 0     ARIPiprazole (ABILIFY) 5 MG tablet Pt takes half       buPROPion (WELLBUTRIN XL) 150 MG 24 hr tablet Take 150 mg by mouth every morning.       CALCIUM PO Take  by mouth.       Cholecalciferol (D3 VITAMIN PO)        COD LIVER OIL PO Take  by mouth.       hydrocortisone 2.5 % cream Apply topically 2 times daily To the area on the forehead for up to 2-3 weeks 30 g  1     ketoconazole (NIZORAL) 2 % external shampoo Apply topically three times a week 120 mL 6     ketoconazole (NIZORAL) 2 % shampoo Apply topically to scalp, lather, leave on for 3-5 minutes, then rinse.  Use ever other day 120 mL 12     loratadine (CLARITIN) 10 MG tablet Take 10 mg by mouth daily       Multiple Vitamins-Minerals (MULTIPLE VITAMINS/WOMENS PO) Take  by mouth.       Probiotic Product (PROBIOTIC PO) Take  by mouth.       rizatriptan (MAXALT-MLT) 5 MG ODT Take 1-2 tablets (5-10 mg) by mouth at onset of headache for migraine May repeat dose in 2 hours.  Do not exceed 30 mg in 24 hours 10 tablet 1     vortioxetine (TRINTELLIX) 10 MG tablet Take 1 tablet (10 mg) by mouth daily       Social History     Tobacco Use     Smoking status: Never Smoker     Smokeless tobacco: Never Used   Substance Use Topics     Alcohol use: No       OBJECTIVE  /79 (BP Location: Left arm, Patient Position: Chair, Cuff Size: Adult Regular)   Pulse 73   Temp 98.1  F (36.7  C) (Oral)   Wt 82.9 kg (182 lb 12.8 oz)   SpO2 96%   BMI 31.38 kg/m      Physical Exam   Constitutional: She is oriented to person, place, and time. No distress.   HENT:   Left Ear: External ear normal.   Ears: RIGHT TM not visualized cerumen impaction    POST EAR IRRIGATION: RIGHT TM with cloudy fluid, erythemic, dull; canal clear   Neck: Neck supple.   Cardiovascular: Normal rate, regular rhythm, normal heart sounds and intact distal pulses. Exam reveals no gallop and no friction rub.   No murmur heard.  Pulmonary/Chest: Effort normal and breath sounds normal. No respiratory distress. She has no wheezes.   Lymphadenopathy:     She has no cervical adenopathy.   Neurological: She is alert and oriented to person, place, and time.   Skin: Skin is warm. Capillary refill takes less than 2 seconds. No rash noted.   Psychiatric: She has a normal mood and affect.       Labs:  No results found for this or any previous visit (from the past 24  hour(s)).    ASSESSMENT:    ICD-10-CM    1. Non-recurrent acute suppurative otitis media of right ear with spontaneous rupture of tympanic membrane H66.011 amoxicillin (AMOXIL) 500 MG capsule   2. Impacted cerumen of right ear H61.21 HC REMOVAL IMPACTED CERUMEN IRRIGATION/LVG UNILAT        Medical Decision Making:    Differential Diagnosis:  URI Adult/Peds:  Acute right otitis media, Allergic rhinitis and cerumen impaction    Serious Comorbid Conditions:  Adult:  None    PLAN: Discussed ear infection findings post ear irrigation, plan and treatment with antibiotics, advised completion of full course and follow up if symptoms do not improve or persist past 3 days.   Discussed cares for cerumen impaction, and post irrigation ear cares.     Symptomatic cares with OTC tylenol or NSAIDs as needed for pain. Education provided. Follow up with PCP as needed. Patient agreed to the plan of care.     MINNIE Mendes, CNP      Patient Instructions     Patient Education     Middle Ear Infection (Adult)  You have an infection of the middle ear, the space behind the eardrum. This is also called acute otitis media (AOM). Sometimes it is caused by the common cold. This is because congestion can block the internal passage (eustachian tube) that drains fluid from the middle ear. When the middle ear fills with fluid, bacteria can grow there and cause an infection. Oral antibiotics are used to treat this illness, not ear drops. Symptoms usually start to improve within 1 to 2 days of treatment.    Home care  The following are general care guidelines:    Finish all of the antibiotic medicine given, even though you may feel better after the first few days.    You may use over-the-counter medicine, such as acetaminophen or ibuprofen, to control pain and fever, unless something else was prescribed. If you have chronic liver or kidney disease or have ever had a stomach ulcer or gastrointestinal bleeding, talk with your healthcare  provider before using these medicines. Do not give aspirin to anyone under 18 years of age who has a fever. It may cause severe illness or death.  Follow-up care  Follow up with your healthcare provider, or as advised, in 2 weeks if all symptoms have not gotten better, or if hearing doesn't go back to normal within 1 month.  When to seek medical advice  Call your healthcare provider right away if any of these occur:    Ear pain gets worse or does not improve after 3 days of treatment    Unusual drowsiness or confusion    Neck pain, stiff neck, or headache    Fluid or blood draining from the ear canal    Fever of 100.4 F (38 C) or as advised     Seizure  Date Last Reviewed: 6/1/2016 2000-2018 [employer]. [address] [place] [address], [place] [address]. All rights reserved. This information is not intended as a substitute for professional medical care. Always follow your healthcare professional's instructions.           Patient Education       Earwax, Home Treatment    Everyone produces earwax from the lining of the ear canal. It serves to lubricate and protect the ear. The wax that forms in the canal naturally moves toward the outside of the ear and falls out. Sometimes the ear canal may contain too much wax. This can cause a blockage and loss of hearing. Directions are given below for home treatment.  Home care  If your doctor has advised you to remove a wax blockage yourself, follow these directions:    Unless a medicine was prescribed, you may use an over-the-counter product made for clearing earwax. These contain carbamide peroxide. Lie down with the blocked ear facing upward. Apply one dropper full of medicine and wait a few minutes. Grasp the outer ear and wiggle it to help the solution enter the canal.    Lean over a sink or basin with the blocked ear facing downward. Use a bulb syringe filled with warm (not hot or cold) water to rinse the ear several times. Use gentle pressure only.    If you are  having trouble draining the water out of your ear canal, put a few drops of rubbing alcohol (isopropyl alcohol) into the ear canal. This will help remove the remaining water.    Repeat this procedure once a day for up to three days, or until your hearing is back to normal. Do not use this treatment for more than three days in a row.  Don ts    Don t use cold water to rinse the ear. This will make you dizzy.    Don t perform this procedure if you have an ear infection.    Don t perform this procedure if you have a ruptured eardrum.    Don t use cotton swabs, matches, hairpins, keys, or other objects to  clean  the ear canal. This can cause infection of the ear canal or rupture the eardrum. Because of their size and shape, cotton swabs can push earwax deeper into the ear canal instead of removing it.  Follow-up care  Follow up with your health care provider if you are not improving after three cleaning attempts, or as advised.  When to seek medical advice  Call your health care provider right away if any of these occur:    Worsening ear pain    Fever of 101 F (38.3 C) or higher, or as directed by your health care provider    Hearing does not return to normal after three days of treatment    Fluid drainage or bleeding from the ear canal    Swelling, redness, or tenderness of the outer ear    Headache, neck pain, or stiff neck    [id number] [employer]. [address] [place] [address], [place] [address]. All rights reserved. This information is not intended as a substitute for professional medical care. Always follow your healthcare professional's instructions.

## 2019-07-29 NOTE — PROGRESS NOTES
"      Follow up from previous visit to discuss sleep issues.      1.  Reason for the visit:  Discuss Hyporsomnia  2.  Referring provider and clinic name:  Dr. Farhad Manuel  3.  Previous Sleep Doctor or Pulmonlogist (clinic name)?  Sleep Study done at [facility] [place]  4.  Records, Procedures, Imaging, and Labs (see below)  KULWANT needed        All NOTES from previous office visits that pertain to why they are being seen in the Sleep Center    Previous Sleep Studies, Chest CT, Echos and reports that pertain to why they are seeing Sleep Center    All Sleep records that have been done in the last 2 years that pertain to why they are seeing Sleep Center            Are they being seen for continuation of care for Cpap/Bipap/Avap/Trilogy/Dental Device? None    If yes to above Who and Where was Device issued/currently getting supplies from? na    Are you currently on \"Supplemental Oxygen\" during the day or night?   na                                                                                                                                                      Please remind pt to bring Cpap machine and ask to arrive 15 minutes early to appointment due traffic and congestion                                                 5. Pt Sleep Center Packet received Message left asking pt to arrive 30 minutes early to appointment if no packet received.        Yes: \"please make sure that you bring this to your appointment completed, either the doctor will not see you until this completed or you may be asked to reschedule your appointment.\"     No: mail or email to the pt and explain, \"please make sure that you bring this to your appointment completed, either the doctor will not see you until this completed or you may be asked to reschedule your appointment.\"     ~If pt coming early to fill packet out, ask that they come 30 minutes prior to their appointment~     6. Has the pt's medication list been updated and preferred pharmacy added?     7. " "Has the allergy list been reviewed?    \"Thank you for choosing [facility] and we look forward to seeing you at your upcoming appointment\"   Allergies:    Allergies   Allergen Reactions     Phenergan Vc [Promethazine Vc] Visual Disturbance       Medications:    Current Outpatient Medications   Medication Sig Dispense Refill     amoxicillin (AMOXIL) 500 MG capsule Take 1 capsule (500 mg) by mouth 2 times daily 20 capsule 0     ARIPiprazole (ABILIFY) 5 MG tablet Pt takes half       buPROPion (WELLBUTRIN XL) 150 MG 24 hr tablet Take 150 mg by mouth every morning.       CALCIUM PO Take  by mouth.       Cholecalciferol (D3 VITAMIN PO)        COD LIVER OIL PO Take  by mouth.       hydrocortisone 2.5 % cream Apply topically 2 times daily To the area on the forehead for up to 2-3 weeks 30 g 1     ketoconazole (NIZORAL) 2 % external shampoo Apply topically three times a week 120 mL 6     ketoconazole (NIZORAL) 2 % shampoo Apply topically to scalp, lather, leave on for 3-5 minutes, then rinse.  Use ever other day 120 mL 12     loratadine (CLARITIN) 10 MG tablet Take 10 mg by mouth daily       Multiple Vitamins-Minerals (MULTIPLE VITAMINS/WOMENS PO) Take  by mouth.       Probiotic Product (PROBIOTIC PO) Take  by mouth.       rizatriptan (MAXALT-MLT) 5 MG ODT Take 1-2 tablets (5-10 mg) by mouth at onset of headache for migraine May repeat dose in 2 hours.  Do not exceed 30 mg in 24 hours 10 tablet 1     vortioxetine (TRINTELLIX) 10 MG tablet Take 1 tablet (10 mg) by mouth daily         Problem List:  Patient Active Problem List    Diagnosis Date Noted     Right-sided low back pain without sciatica 08/28/2018     Priority: Medium     Anxiety associated with depression 08/08/2017     Priority: Medium     Acne 01/08/2015     Priority: Medium     Cystic. RX spironolactone 1/8/15: start 25 mg x 7day, then 50 mg daily       Hair loss 01/08/2015     Priority: Medium     Plantar fascial fibromatosis 11/10/2014     Priority: " Medium     Contraception -- abstinence 09/04/2013     Priority: Medium     Nexplanon info given.       Diaphoresis 09/04/2013     Priority: Medium     Unknown cause.       Depression 02/08/2012     Priority: Medium     Controlled on meds followed by Psych  Problem list name updated by automated process. Provider to review and confirm       OCD (obsessive compulsive disorder) 02/08/2012     Priority: Medium     Controlled on meds w Psychiatrist Tanisha Becker       IBS (irritable bowel syndrome) 02/08/2012     Priority: Medium     Diarrhea-type       Migraines 02/08/2012     Priority: Medium     Exacerbated with menses. Currently 1-2 per month, lasting 3-4 days. Does have aura.       Hyperlipidemia 02/08/2012     Priority: Medium        Past Medical/Surgical History:  Past Medical History:   Diagnosis Date     Abnormal Pap smear 2006?    dysplasia  X 1; paps OK since then...     Arm DVT (deep venous thromboembolism), acute (H) 2008    hx with phlebitis in IVs after a surgery     Breast disorder 2016    2D mammo, then 3D & u/s to r/o = scar tissue from reduction     Cholesterol serum elevated     runs in family     Complication of anesthesia 2000?, 2016    very slow to awake from both gen. anesth. & conscious sedati     Depression      Encounter for insertion of mirena IUD 2011    D/C'd w increased acne     Hypertension 2017&2018    jamin. during exercise, caused by stimulant for depression?     IBS (irritable bowel syndrome) 2002    under control, better with probiotic     Menarche age 15    cycles q 1-2 months Xs 3-4 d w bad cramps     Migraines 2008    a lot better now, may be stress related     OCD (obsessive compulsive disorder)      Seasonal allergies      Past Surgical History:   Procedure Laterality Date     BIOPSY  2000    dermatology-moles     HEAD & NECK SURGERY  1990?, 2016    wisdom teeth extracted, gum tissue grafting/oral surgery     LASIK Bilateral 2008     MAMMOPLASTY REDUCTION BILATERAL  1197            Physical Examination:  Vitals: There were no vitals taken for this visit.  BMI= There is no height or weight on file to calculate BMI.         [place] Total Score 3/24/2017   Total score - [place] 12       GENERAL APPEARANCE: healthy, alert and mild distress  EYES: Eyes grossly normal to inspection  Allergies:    Allergies   Allergen Reactions     Phenergan Vc [Promethazine Vc] Visual Disturbance       Medications:    Current Outpatient Medications   Medication Sig Dispense Refill     amoxicillin (AMOXIL) 500 MG capsule Take 1 capsule (500 mg) by mouth 2 times daily 20 capsule 0     ARIPiprazole (ABILIFY) 5 MG tablet Pt takes half       buPROPion (WELLBUTRIN XL) 150 MG 24 hr tablet Take 150 mg by mouth every morning.       CALCIUM PO Take  by mouth.       Cholecalciferol (D3 VITAMIN PO)        COD LIVER OIL PO Take  by mouth.       hydrocortisone 2.5 % cream Apply topically 2 times daily To the area on the forehead for up to 2-3 weeks 30 g 1     ketoconazole (NIZORAL) 2 % external shampoo Apply topically three times a week 120 mL 6     ketoconazole (NIZORAL) 2 % shampoo Apply topically to scalp, lather, leave on for 3-5 minutes, then rinse.  Use ever other day 120 mL 12     loratadine (CLARITIN) 10 MG tablet Take 10 mg by mouth daily       Multiple Vitamins-Minerals (MULTIPLE VITAMINS/WOMENS PO) Take  by mouth.       Probiotic Product (PROBIOTIC PO) Take  by mouth.       rizatriptan (MAXALT-MLT) 5 MG ODT Take 1-2 tablets (5-10 mg) by mouth at onset of headache for migraine May repeat dose in 2 hours.  Do not exceed 30 mg in 24 hours 10 tablet 1     vortioxetine (TRINTELLIX) 10 MG tablet Take 1 tablet (10 mg) by mouth daily         Problem List:  Patient Active Problem List    Diagnosis Date Noted     Right-sided low back pain without sciatica 08/28/2018     Priority: Medium     Anxiety associated with depression 08/08/2017     Priority: Medium     Acne 01/08/2015     Priority: Medium     Cystic. RX  spironolactone 1/8/15: start 25 mg x 7day, then 50 mg daily       Hair loss 01/08/2015     Priority: Medium     Plantar fascial fibromatosis 11/10/2014     Priority: Medium     Contraception -- abstinence 09/04/2013     Priority: Medium     Nexplanon info given.       Diaphoresis 09/04/2013     Priority: Medium     Unknown cause.       Depression 02/08/2012     Priority: Medium     Controlled on meds followed by Psych  Problem list name updated by automated process. Provider to review and confirm       OCD (obsessive compulsive disorder) 02/08/2012     Priority: Medium     Controlled on meds w Psychiatrist Tanisha Becker       IBS (irritable bowel syndrome) 02/08/2012     Priority: Medium     Diarrhea-type       Migraines 02/08/2012     Priority: Medium     Exacerbated with menses. Currently 1-2 per month, lasting 3-4 days. Does have aura.       Hyperlipidemia 02/08/2012     Priority: Medium        Past Medical/Surgical History:  Past Medical History:   Diagnosis Date     Abnormal Pap smear 2006?    dysplasia  X 1; paps OK since then...     Arm DVT (deep venous thromboembolism), acute (H) 2008    hx with phlebitis in IVs after a surgery     Breast disorder 2016    2D mammo, then 3D & u/s to r/o = scar tissue from reduction     Cholesterol serum elevated     runs in family     Complication of anesthesia 2000?, 2016    very slow to awake from both gen. anesth. & conscious sedati     Depression      Encounter for insertion of mirena IUD 2011    D/C'd w increased acne     Hypertension 2017&2018    jamin. during exercise, caused by stimulant for depression?     IBS (irritable bowel syndrome) 2002    under control, better with probiotic     Menarche age 15    cycles q 1-2 months Xs 3-4 d w bad cramps     Migraines 2008    a lot better now, may be stress related     OCD (obsessive compulsive disorder)      Seasonal allergies      Past Surgical History:   Procedure Laterality Date     BIOPSY  2000    dermatology-moles      HEAD & NECK SURGERY  1990?, 2016    wisdom teeth extracted, gum tissue grafting/oral surgery     LASIK Bilateral 2008     MAMMOPLASTY REDUCTION BILATERAL  1197           Physical Examination:  Vitals: There were no vitals taken for this visit.  BMI= There is no height or weight on file to calculate BMI.         [place] Total Score 3/24/2017   Total score - [place] 12       GENERAL APPEARANCE: healthy, alert and mild distress  EYES: Eyes grossly normal to inspection  HENT: oropharynx crowded, soft palate dependent and redundant tissue, nares mildly congested  NECK: thyroid normal to palpation  RESP: lungs clear to auscultation - no rales, rhonchi or wheezes  CV: regular rates and rhythm, normal S1 S2, no S3 or S4 and no murmur, click or rub  Musculoskeletal:  Mallampati Class: III.  Tonsillar Stage: 1  hidden by pillars.  Dental: Dentition in good condition.  Good advancement of lower jaw without tmd  Skin: without facial rash      DATE OF VISIT:  03/24/2017       CHIEF COMPLAINT:  Dr. Llamas has requested Ms. Mireille Walls to be seen in consultation for difficulties with sleep.      HISTORY OF PRESENT ILLNESS:  The patient reports continuing persistent fatigue associated with talking and yelling in her sleep.  She had a previous sleep study done at [facility] approximately 15 years ago without recommendations, was diagnosed with idiopathic hypersomnia.  Currently some reports from relatives that there may be some light snoring that occurs.  It is rare for her to wake up with a snort arousal.  Also reports a dry throat and trouble breathing through her nose.  There have been no witnessed apneas.  When she does have arousals from sleep she notices that she has been on her back.  She also sleeps on her sides.  No signs of orexin deficiency.  Behaviors at night can occur 1-2 times a week and they include talking, screaming, cussing, motioning, flailing about it.  If she has gone to bed at 1:00, 2:00 or  3:00 in the morning, they can occur somewhere between 3:00 and 5:00 a.m.  She sometimes will awaken after having heard perhaps a loud scream.  Otherwise, unless she is sleeping at her mother's, she is not aware of the frequency of these events.  She feels there has been no intentional re-creation of activities during a dream.  She flails.  She has kicked the wall and if she flails and connects with something, that may cause a wakeup. She is difficult to arouse when sleeping at her mother's after a screaming or flailing event.      She has been working for 6 years doing the p.m. shift, starting work, I believe somewhere around 3:00 and working until 11:00 or 12:00, then getting home and enters bed somewhere between 1:00 a.m. or 2:30 in the morning.  If she works the next day she exits bed at 11:00 a.m.  If she does not work her sleep schedule shows bedtime somewhere around 9:00 p.m. and she will have a wakeup around 11:00 a.m., take her medications, go back to sleep and stay in bed until around 2:00 p.m.  Her staying asleep and spending a lot of time in bed is related to her depression and her OCD diagnosis and she is between providers for these concerns.  She will feel as if she just has not gotten enough sleep or quality of sleep.      Sleep latency 20-30 minutes.  One bathroom break.  It takes 5 minutes to fall back asleep with a wakeup.  On work nights she may get 10 hours, on weekends or days off ,17-22 hours.  There is some uncertainty as to how long she has been asleep.  Four to 5 days a week she may take what she considers to be a nap.  She may take a brief nap before she goes into work or on days off, her naps may be 3-5 hours, that is included in her total sleep time previously mentioned.  No activities in bed such as TV or screens.      SOCIAL AND PERSONAL HISTORY:  She is single.  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      SLEEP  ENVIRONMENT:  Conducive to good sleep.  She denies any noise that may be triggers.        Family members have been diagnosed with ALIVIA; however, her brother is obese.  No use of alcohol, marijuana or over-the-counter sleep aids.  Does have some minimal caffeine.      [place] Sleepiness score today is 12/24 without difficulties with driving.  Sleepiness does affect her work, has had problems with attendance, she has used FMLA and currently her FMLA is not available to her any longer.  Thirty out of 30 days, problems with OCD and depression.  She is encouraged to get a new provider.  At this point, she feels provider is in the throes of senior living.  Thirty out of 30 days tired and fatigued.        REVIEW OF SYSTEMS:  A 14-point comprehensive review of systems was completed and negative with the exception of the following:  She has allergies to dust, it is year around.  Takes Claritin.  Does also get some congestion if her Claritin has not been taken where her eyes start running.        MENTAL HEALTH:  Depression, anxiety and OCD.      Allergies:    Allergies   Allergen Reactions     Phenergan Vc [Promethazine Vc] Visual Disturbance       Medications:    Current Outpatient Prescriptions   Medication Sig Dispense Refill     rizatriptan (MAXALT-MLT) 5 MG disintegrating tablet Take 1-2 tablets (5-10 mg) by mouth at onset of headache for migraine May repeat dose in 2 hours.  Do not exceed 30 mg in 24 hours 10 tablet 1     norgestimate-ethinyl estradiol (ORTHO-CYCLEN, SPRINTEC) 0.25-35 MG-MCG per tablet Take 1 tablet by mouth daily 84 tablet 3     norethindrone (MICRONOR) 0.35 MG per tablet Take 1 tablet (0.35 mg) by mouth daily 84 tablet 3     ketoconazole (NIZORAL) 2 % shampoo Apply topically to scalp, lather, leave on for 3-5 minutes, then rinse.  Use ever other day 120 mL 12     loratadine (CLARITIN) 10 MG tablet Take 10 mg by mouth daily       Fluvoxamine Maleate 100 MG CP24 Take 200 mg by mouth daily.        amphetamine-dextroamphetamine (ADDERALL) 30 MG tablet Take 30 mg by mouth daily.       buPROPion (WELLBUTRIN XL) 150 MG 24 hr tablet Take 150 mg by mouth every morning.       CALCIUM PO Take  by mouth.       Multiple Vitamins-Minerals (MULTIPLE VITAMINS/WOMENS PO) Take  by mouth.       COD LIVER OIL PO Take  by mouth.       Probiotic Product (PROBIOTIC PO) Take  by mouth.         Problem List:  Patient Active Problem List    Diagnosis Date Noted     Hair loss 01/08/2015     Priority: Medium     Contraception -- abstinence 09/04/2013     Priority: Medium     Nexplanon info given.       Acne 01/08/2015     Cystic. RX spironolactone 1/8/15: start 25 mg x 7day, then 50 mg daily       Plantar fascial fibromatosis 11/10/2014     Diaphoresis 09/04/2013     Unknown cause.       Depression 02/08/2012     Controlled on meds followed by Psych  Problem list name updated by automated process. Provider to review and confirm       OCD (obsessive compulsive disorder) 02/08/2012     Controlled on meds w Psychiatrist Tanisha Becker       IBS (irritable bowel syndrome) 02/08/2012     Diarrhea-type       Migraines 02/08/2012     Exacerbated with menses. Currently 1-2 per month, lasting 3-4 days. Does have aura.       Hyperlipidemia 02/08/2012        Past Medical/Surgical History:  Past Medical History:   Diagnosis Date     Abnormal Pap smear 2006?    dysplasia  X 1; paps OK since then...     Arm DVT (deep venous thromboembolism), acute (H) 2008    hx with phlebitis in IVs after a surgery     Cholesterol serum elevated     runs in family     Complication of anesthesia 2000?, 2016    very slow to awake from both gen. anesth. & conscious sedati     Depression      Encounter for insertion of mirena IUD 2011    D/C'd w increased acne     IBS (irritable bowel syndrome) 2002    under control, better with probiotic     Menarche age 15    cycles q 1-2 months Xs 3-4 d w bad cramps     Migraines 2008    a lot better now, may be stress related  "    OCD (obsessive compulsive disorder)      Seasonal allergies      Past Surgical History:   Procedure Laterality Date     HEAD & NECK SURGERY  1990?, 2016    wisdom teeth extracted, gum tissue grafting/oral surgery     LASIK Bilateral 2008     MAMMOPLASTY REDUCTION BILATERAL  1197           Physical Examination:  Vitals: /89 (BP Location: Right arm, Patient Position: Supine, Cuff Size: Adult Regular)  Pulse 76  Resp 16  Ht 1.626 m (5' 4\")  Wt 76.7 kg (169 lb)  SpO2 98%  BMI 29.01 kg/m2  BMI= Body mass index is 29.01 kg/(m^2).         [place] Total Score 3/24/2017   Total score - [place] 12       GENERAL APPEARANCE: healthy, alert and mild distress  EYES: Eyes grossly normal to inspection  HENT: oropharynx crowded, soft palate dependent and redundant tissue, nares mildly congested  NECK: thyroid normal to palpation  RESP: lungs clear to auscultation - no rales, rhonchi or wheezes  CV: regular rates and rhythm, normal S1 S2, no S3 or S4 and no murmur, click or rub  Musculoskeletal:  Mallampati Class: III.  Tonsillar Stage: 1  hidden by pillars.  Dental: Dentition in good condition.  Good advancement of lower jaw without tmd  Skin: without facial rash    ASSESSMENT AND PLAN:  Ms. Walls's STOP-Bang score is likely, at her age, is 2 giving her no to an exceptionally low probability of obstructive sleep apnea; however, her sleep is disturbed by arousals of which she does not usually come to full consciousness unless she has hurt herself by thrashing and hitting her surroundings and sometimes there may be a vague dream of protecting relatives.  They occur in the early part of the evening and of interest the start of these behaviors (with our review today) an awareness that they have come upon her as she shifted her work from daytime to working evenings and sleeping into the daytime hours.  When coupled with her depression and anxiety and feeling that she cannot get enough sleep, she is spending most of her " time in bed.The following plan of care has been developed:     1.  Parasomnias.  They do sound to be more of a night terror than confusional arousals with no evidence of leaving her bed with minimal signs of injury and nothing displaced in her home.  Will use actigraphy or sleep logs to identify patterns and frequency of events. She is willing to drive to any center to  actigraphy. To that end, we also discussed the possibility of going back to working day shift and she seems interested in that and will make some inquiry as to what that may look like considering the fact that she fully enjoys her role in her work.  She has [employer] and will get in touch with me if she is able to be provided actigraphy and we will keep in touch as she pursues gathering information about changing to day shift. Followup will be based on actigraphy availability.  Our plan is being seen again roughly in 3-4 weeks.   2.  Snoring and sometimes with daytime congestion, I encouraged her to continue with her Claritin and treat some rhinitis.   3  Rhinitis.  I have demonstrated the use of Flonase.     4.  Depression with obsessive compulsive disorder.  She needs to be seen regularly so that we can add degree of activity to her daytime function when she is not working.      Thank you for allowing me to participate in this kind woman's care.  I will see her roughly in 1 month's time, sooner if needed.  We will pursue actigraphy; however, with the association of her change in shifts of sleep, it does sound like this is a case of misalignment of her circadian rhythm resulting in parasomnias.  Time spent with patient 60 minutes, of which greater than 50% was spent in counseling, education and coordination of care.         ASHLIE ALMANZA, APRN, CNP      CC: Worsening of sleep disordered breathing symptoms, and leaving the bed with some mild injury, while reenacting her dreams towards the later part of the night.    HPI: Previously seen by me  with reports of persistent fatigue associated with talking and yelling in her sleep.  She had a previous sleep study done at [facility] approximately 15 years ago without recommendations, was diagnosed with idiopathic hypersomnia.  Currently some reports from relatives that there may be some light snoring that occurs.  It is rare for her to wake up with a snort arousal.  Also reports a dry throat and trouble breathing through her nose.  She had a stop bang score of 2/8. her sleep is disturbed by arousals of which she does not usually come to full consciousness unless she has hurt herself by thrashing and hitting her surroundings and sometimes there may be a vague dream of protecting relatives.  They occur in the early part of the evening and of interest the start of these behaviors (with our review today) an awareness that they have come upon her as she shifted her work from daytime to working evenings and sleeping into the daytime hours.  When coupled with her depression and anxiety and feeling that she cannot get enough sleep, she is spending most of her time in bed she also performs shiftwork, with minimal time to provide for sleep with quick turnaround shift starting times.  Actigraphy was recommended to evaluate total sleep time and regularity of sleep schedule.  She had been on Adderall at the time that I saw her for idiopathic hypersomnia prescribed after having a polysomnogram at [facility].    She returns to clinic today stating that she has been off of Adderall due to excessive diarrhea recess her weight has increased she has had more snort arousals and she continues to talk and scream.  However, she has been known now to leave the bed and pick a wall awakening herself from a dream where she is kicking in her dream.  She would like to be considered for a polysomnogram due to continued abnormal behavior that has with her wake ups evolved into dream enactment.  She is  currently on Abilify and Trintellix and her main complaint is fatigue and sleep is nonrestorative.    Sleep schedule: Enters bed at 9 PM, on days off between 12 and 1 AM.  When she works evenings she will be up until 1 AM.  She feels that her sleep schedule has even somewhat once or even twice later in the day due to tiredness    Review of systems: Completed and negative with exception of the following:  Constitutional: Weight is up about 8 pounds or more with the addition of her new medication.  Respiratory: Signs and symptoms of possible sleep disordered breathing with snort and gasping arousals otherwise no significant shortness of breath.  Cardiovascular: Denies any problems with hypertension, heart disease,  Mental-health: Depression and anxiety persist, feels better on her new medications however still has is chronically tired and fatigued.    Allergies:    Allergies   Allergen Reactions     Phenergan Vc [Promethazine Vc] Visual Disturbance       Medications:    Current Outpatient Medications   Medication Sig Dispense Refill     ARIPiprazole (ABILIFY) 5 MG tablet Pt takes half       buPROPion (WELLBUTRIN XL) 150 MG 24 hr tablet Take 150 mg by mouth every morning.       CALCIUM PO Take  by mouth.       Cholecalciferol (D3 VITAMIN PO)        COD LIVER OIL PO Take  by mouth.       hydrocortisone 2.5 % cream Apply topically 2 times daily To the area on the forehead for up to 2-3 weeks 30 g 1     ketoconazole (NIZORAL) 2 % external shampoo Apply topically three times a week 120 mL 6     ketoconazole (NIZORAL) 2 % shampoo Apply topically to scalp, lather, leave on for 3-5 minutes, then rinse.  Use ever other day 120 mL 12     loratadine (CLARITIN) 10 MG tablet Take 10 mg by mouth daily       Multiple Vitamins-Minerals (MULTIPLE VITAMINS/WOMENS PO) Take  by mouth.       Probiotic Product (PROBIOTIC PO) Take  by mouth.       rizatriptan (MAXALT-MLT) 5 MG ODT Take 1-2 tablets (5-10 mg) by mouth at onset of headache for  migraine May repeat dose in 2 hours.  Do not exceed 30 mg in 24 hours 10 tablet 1     vortioxetine (TRINTELLIX) 10 MG tablet Take 1 tablet (10 mg) by mouth daily       zolpidem (AMBIEN) 5 MG tablet Take tablet by mouth 15 minutes prior to sleep, for Sleep Study 1 tablet 0       Problem List:  Patient Active Problem List    Diagnosis Date Noted     Right-sided low back pain without sciatica 08/28/2018     Priority: Medium     Anxiety associated with depression 08/08/2017     Priority: Medium     Acne 01/08/2015     Priority: Medium     Cystic. RX spironolactone 1/8/15: start 25 mg x 7day, then 50 mg daily       Hair loss 01/08/2015     Priority: Medium     Plantar fascial fibromatosis 11/10/2014     Priority: Medium     Contraception -- abstinence 09/04/2013     Priority: Medium     Nexplanon info given.       Diaphoresis 09/04/2013     Priority: Medium     Unknown cause.       Depression 02/08/2012     Priority: Medium     Controlled on meds followed by Psych  Problem list name updated by automated process. Provider to review and confirm       OCD (obsessive compulsive disorder) 02/08/2012     Priority: Medium     Controlled on meds w Psychiatrist Tanisha Becker       IBS (irritable bowel syndrome) 02/08/2012     Priority: Medium     Diarrhea-type       Migraines 02/08/2012     Priority: Medium     Exacerbated with menses. Currently 1-2 per month, lasting 3-4 days. Does have aura.       Hyperlipidemia 02/08/2012     Priority: Medium        Past Medical/Surgical History:  Past Medical History:   Diagnosis Date     Abnormal Pap smear 2006?    dysplasia  X 1; paps OK since then...     Arm DVT (deep venous thromboembolism), acute (H) 2008    hx with phlebitis in IVs after a surgery     Breast disorder 2016    2D mammo, then 3D & u/s to r/o = scar tissue from reduction     Cholesterol serum elevated     runs in family     Complication of anesthesia 2000?, 2016    very slow to awake from both gen. anesth. & conscious  "sedati     Depression      Encounter for insertion of mirena IUD 2011    D/C'd w increased acne     Hypertension 2017&2018    jamin. during exercise, caused by stimulant for depression?     IBS (irritable bowel syndrome) 2002    under control, better with probiotic     Menarche age 15    cycles q 1-2 months Xs 3-4 d w bad cramps     Migraines 2008    a lot better now, may be stress related     OCD (obsessive compulsive disorder)      Seasonal allergies      Past Surgical History:   Procedure Laterality Date     BIOPSY  2000    dermatology-moles     HEAD & NECK SURGERY  1990?, 2016    wisdom teeth extracted, gum tissue grafting/oral surgery     LASIK Bilateral 2008     MAMMOPLASTY REDUCTION BILATERAL  1197           Physical Examination:  Vitals: /81   Pulse 71   Resp 18   Ht 1.626 m (5' 4\")   Wt 83 kg (183 lb)   SpO2 96%   BMI 31.41 kg/m    BMI= Body mass index is 31.41 kg/m .         [place] Total Score 7/30/2019   Total score - [place] 15       GENERAL APPEARANCE: alert and no distress  EYES: Eyes grossly normal to inspection  HENT: oropharynx crowded, soft palate dependent and nasal septal deviation with dec in flow on L  NECK: thyroid normal to palpation  RESP: lungs clear to auscultation - no rales, rhonchi or wheezes  CV: regular rates and rhythm, normal S1 S2, no S3 or S4 and no murmur, click or rub  Extremities are without clubbing, edema  Musculoskeletal:Moves without difficulty  Mallampati Class: IV.  Tonsillar Stage: 1  hidden by pillars.  Dental: Dentition in good condition.  Good advancement of lower jaw without tmd  Skin: with mild facial rash, forehead    A/P: My impression that with new symptoms of not to accident leak kicking the wall next to the bed while sleeping but actually awakening herself due to pain while standing next to the wall and found herself kicking as she was in the dream.  She reports that most of these events currently are occurring in the later part of the evening, she " lives with her mother and she has become exceptionally concerned about the increase volume of her yelling and shouting out.  She also has gained weight and her signs and symptoms of possible sleep disordered breathing have worsened she continues to have a stop bang score of 8 and the following plan of care has been developed:    1.  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  I have recommended that she take Ambien if on able to sleep after approximately 10:15 PM she is to avoid napping the day of the study.  We have discussed all modalities of treatment and she is open to most.  2.  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  She has not left her bedroom at this point in time and has not sustained any long-acting injuries.  I will send her a my chart with the safety precautions to start immediately.  We will talk about treatment options when I see her back in clinic.  At this time no complaints about decrease in sense of smell or change in constipation.  3.  Obesity: Her BMI is 31 and this rise and increase in symptoms certainly sleep disordered breathing may be improved with some weight loss.  She will be encouraged to increase her activity when I see her back in clinic.  4.  Shiftwork: We will need sleep diaries to further evaluate the degree of change and rise time and the increase in her symptoms of daytime sleepiness/fatigue.    Time spent with patient 30 minutes of which greater than 50% was spent in counseling education and coordination of care end of dictation

## 2019-07-30 ENCOUNTER — OFFICE VISIT (OUTPATIENT)
Dept: SLEEP MEDICINE | Facility: CLINIC | Age: 44
End: 2019-07-30
Payer: COMMERCIAL

## 2019-07-30 VITALS
OXYGEN SATURATION: 96 % | RESPIRATION RATE: 18 BRPM | SYSTOLIC BLOOD PRESSURE: 116 MMHG | HEART RATE: 71 BPM | WEIGHT: 183 LBS | HEIGHT: 64 IN | DIASTOLIC BLOOD PRESSURE: 81 MMHG | BODY MASS INDEX: 31.24 KG/M2

## 2019-07-30 DIAGNOSIS — G47.52 RBD (REM BEHAVIORAL DISORDER): ICD-10-CM

## 2019-07-30 DIAGNOSIS — E66.811 OBESITY (BMI 30.0-34.9): ICD-10-CM

## 2019-07-30 DIAGNOSIS — R06.83 SNORING: Primary | ICD-10-CM

## 2019-07-30 DIAGNOSIS — G47.26 CIRCADIAN RHYTHM SLEEP DISORDER, SHIFT WORK TYPE: ICD-10-CM

## 2019-07-30 PROCEDURE — 99214 OFFICE O/P EST MOD 30 MIN: CPT | Performed by: NURSE PRACTITIONER

## 2019-07-30 RX ORDER — ZOLPIDEM TARTRATE 5 MG/1
TABLET ORAL
Qty: 1 TABLET | Refills: 0 | Status: SHIPPED | OUTPATIENT
Start: 2019-07-30 | End: 2019-09-17

## 2019-07-30 ASSESSMENT — MIFFLIN-ST. JEOR: SCORE: 1465.08

## 2019-07-30 NOTE — PATIENT INSTRUCTIONS
If you need to cancel your sleep study, please call as soon as possible.  If you were prescribed a sleep aid, bring that to the sleep lab. Take ambien if unable to fall alseep after 10:15P  Avoid napping day of study    Please remember: do not drive if sleepy    psg and follow up with me    Your BMI is Body mass index is 31.41 kg/m .  Weight management is a personal decision.  If you are interested in exploring weight loss strategies, the following discussion covers the approaches that may be successful. Body mass index (BMI) is one way to tell whether you are at a healthy weight, overweight, or obese. It measures your weight in relation to your height.  A BMI of 18.5 to 24.9 is in the healthy range. A person with a BMI of 25 to 29.9 is considered overweight, and someone with a BMI of 30 or greater is considered obese. More than two-thirds of American adults are considered overweight or obese.  Being overweight or obese increases the risk for further weight gain. Excess weight may lead to heart disease and diabetes.  Creating and following plans for healthy eating and physical activity may help you improve your health.  Weight control is part of healthy lifestyle and includes exercise, emotional health, and healthy eating habits. Careful eating habits lifelong are the mainstay of weight control. Though there are significant health benefits from weight loss, long-term weight loss with diet alone may be very difficult to achieve- studies show long-term success with dietary management in less than 10% of people. Attaining a healthy weight may be especially difficult to achieve in those with severe obesity. In some cases, medications, devices and surgical management might be considered.  What can you do?  If you are overweight or obese and are interested in methods for weight loss, you should discuss this with your provider.     Consider reducing daily calorie intake by 500 calories.     Keep a food journal.      Avoiding skipping meals, consider cutting portions instead.    Diet combined with exercise helps maintain muscle while optimizing fat loss. Strength training is particularly important for building and maintaining muscle mass. Exercise helps reduce stress, increase energy, and improves fitness. Increasing exercise without diet control, however, may not burn enough calories to loose weight.       Start walking three days a week 10-20 minutes at a time    Work towards walking thirty minutes five days a week     Eventually, increase the speed of your walking for 1-2 minutes at time    In addition, we recommend that you review healthy lifestyles and methods for weight loss available through the National Institutes of Health patient information sites:  http://win.niddk.nih.gov/publications/index.htm    And look into health and wellness programs that may be available through your health insurance provider, employer, local community center, or gifty club.    Weight management plan: Patient was referred to their PCP to discuss a diet and exercise plan.       Mireille: It was good to see you in clinic the other day.  Please see the recommendations below for safety precautions when someone can act out dreams.  It would be reasonable to follow through with these measures especially with regard to walking off stairs blocking off axis to the out of doors and removing objects from your room that could be harmful.  Please contact me if you have any concerns about these measures.  The patient is a 22-year-old female, with a 2 year history of fatigue, to the point where she is two tired to do her housework.    -------------------------------------------------------------------------------------    The outside  We never know when a person may progress from acting out dreams while lying in bed, to leaving the bed or jumping from bed.  These recommendations should be considered...    Safety measures should be taken when patients are  acting out their dreams.  We encourage bed partners to sleep separately for their own safety, till we have more data on cause of the behavior.    Window and door locks, along with clearing the bedroom of objects that could be used as a weapon (lamps, hand held mirrors) or firearms.  Placing the bed on the floor can be protective to prevent falls or minimize injury from diving from bed.    MINNIE Youssef, CNP-BC  Sleep Medicine

## 2019-08-02 ENCOUNTER — ANCILLARY PROCEDURE (OUTPATIENT)
Dept: MAMMOGRAPHY | Facility: CLINIC | Age: 44
End: 2019-08-02
Attending: FAMILY MEDICINE
Payer: COMMERCIAL

## 2019-08-02 DIAGNOSIS — Z12.31 VISIT FOR SCREENING MAMMOGRAM: ICD-10-CM

## 2019-08-23 ENCOUNTER — THERAPY VISIT (OUTPATIENT)
Dept: SLEEP MEDICINE | Facility: CLINIC | Age: 44
End: 2019-08-23
Payer: COMMERCIAL

## 2019-08-23 ENCOUNTER — DOCUMENTATION ONLY (OUTPATIENT)
Dept: SLEEP MEDICINE | Facility: CLINIC | Age: 44
End: 2019-08-23

## 2019-08-23 DIAGNOSIS — G47.52 RBD (REM BEHAVIORAL DISORDER): ICD-10-CM

## 2019-08-23 DIAGNOSIS — R06.83 SNORING: ICD-10-CM

## 2019-08-23 LAB — SLPCOMP: NORMAL

## 2019-08-23 PROCEDURE — 95810 POLYSOM 6/> YRS 4/> PARAM: CPT | Performed by: INTERNAL MEDICINE

## 2019-08-23 NOTE — Clinical Note
Koko Ceja, PSG resport is in [facility]. Mild ALIVIA.  Pt has appt with you on 9/17/19.Thanks,Paige

## 2019-09-02 NOTE — PROCEDURES
" SLEEP STUDY INTERPRETATION  DIAGNOSTIC POLYSOMNOGRAPHY REPORT      Patient: XI LYLES  YOB: 1975  Study Date: 8/23/2019  MRN: [id number]  Referring Provider: Dr. Llamas  Ordering Provider: MNINIE Mansfield CNP    Indications for Polysomnography: The patient is a 44 y old Female who is 5' 4\" and weighs 183.0 lbs. Her BMI is 31.6, Gary sleepiness scale 12.0 and neck circumference is 37.0 cm. Patient reports fatigue associated with talking and yelling in her sleep.  She had a previous sleep study done at [facility] approximately 15 years ago without recommendations. She was diagnosed with idiopathic hypersomnia. She reports snoring but no witnessed apneas.  She also reports abnormal sleep related behaviors at night such as talking, screaming and flailing. A diagnostic polysomnogram was performed to evaluate for sleep apnea/ RBD.    Polysomnogram Data: A full night polysomnogram recorded the standard physiologic parameters including EEG, EOG, EMG, ECG, nasal and oral airflow. Respiratory parameters of chest and abdominal movements were recorded with respiratory inductance plethysmography. Oxygen saturation was recorded by pulse oximetry. Hypopnea scoring rule used: 1B 4%.    Sleep Architecture: prolonged sleep latency, severe sleep fragmentation with reduced Sleep efficiency. Stage N3 was not seen and REM sleep was reduced.  The total recording time of the polysomnogram was 436.7 minutes. The total sleep time was 332.0 minutes. Sleep latency was increased at 40.5 minutes without the use of a sleep aid. REM latency was prolonged at 345.0 minutes. Arousal index was increased at 68.5 arousals per hour. Sleep efficiency was decreased at 76.0%. Wake after sleep onset was 63.5 minutes. The patient spent 32.7% of total sleep time in Stage N1, 54.4% in Stage N2, 0.0% in Stage N3, and 13.0% in REM. Time in REM supine was 43.0 minutes.    Respiration: mild obstructive sleep apnea, " predominant during sleep in supine position (there were some central apneas, but the events were predominantly obstructive). Supine AHI: 16.3; Supine RDI: 42.9; Non -supine AHI: 1.1 and non supine RDI: 20.8 per hour.    Events ? The polysomnogram revealed a presence of 13 obstructive, 16 central, and - mixed apneas resulting in an apnea index of 5.2 events per hour. There were 21 obstructive hypopneas and - central hypopneas resulting in an obstructive hypopnea index of 3.8 and central hypopnea index of - events per hour. The combined apnea/hypopnea index was 9.0 events per hour (central apnea/hypopnea index was 2.9 events per hour). The REM AHI was 43.3 events per hour. The supine AHI was 16.3 events per hour. The RERA index was 23.3 events per hour.  The RDI was 32.3 events per hour.    Snoring - was reported as moderate to loud.    Respiratory rate and pattern - was notable for normal respiratory rate and pattern.    Sustained Sleep Associated Hypoventilation - Transcutaneous carbon dioxide monitoring was not used, however significant hypoventilation was not suggested by oximetry.    Sleep Associated Hypoxemia - (Greater than 5 minutes O2 sat at or below 88%) was not present. Baseline oxygen saturation was 95.7%. Lowest oxygen saturation was 88.7%. Time spent less than or equal to 88% was 0 minutes. Time spent less than or equal to 89% was 0 minutes.    Movement Activity: there were no significant periodic limb movements. Intermittent increase in transient muscle activity was present during REM sleep with two episodes of body jerking. Clinical correlation is suggested.    Periodic Limb Activity - There were 8 PLMs during the entire study. The PLM index was 1.4 movements per hour. The PLM Arousal Index was 0.4 per hour.    REM EMG Activity - intermittent increase in transient muscle activity was present with some REM epochs.    Nocturnal Behavior - two episodes of body jerking were noted during REM sleep.      Bruxism - None apparent.    Cardiac Summary: normal sinus rhythm was seen.  The average pulse rate was 68.7 bpm. The minimum pulse rate was 32.0 bpm(artifactual) while the maximum pulse rate was 88.0 bpm.  Arrhythmias were not noted.    Assessment:     Mild obstructive sleep apnea, predominant during sleep in supine position (there were some central apneas, but the events were predominantly obstructive). Supine AHI: 16.3; Supine RDI: 42.9; Non -supine AHI: 1.1 and non supine RDI: 20.8 per hour.     Sleep architecture was remarkable for prolonged sleep latency, severe sleep fragmentation with reduced sleep efficiency. Stage N3 was not seen and REM sleep was reduced.    There were no significant periodic limb movements.     Intermittent increase in transient muscle activity was present during REM sleep with two episodes of body jerking. Clinical correlation is suggested. This could be due to SSRI medication.    Normal sinus rhythm was seen.    Recommendations:    Based on the presence of mild obstructive sleep apnea and excessive daytime sleepiness, treatment could be empirically initiated with Auto?titrating PAP therapy with a range of 5 to 15 cmH2O. Recommend clinical follow up with sleep management team.    Alternate option includes positional therapy during sleep along with dental appliance through referral to Sleep Dentistry for the treatment of obstructive sleep apnea and/or socially disruptive snoring. (since snoring and respiratory effort related arousals were seen even during non supine sleep)    Suggest further inquiry for dream enactment behavior.    Advice regarding the risks of drowsy driving.    Suggest optimizing sleep schedule and avoiding sleep deprivation.    Weight management (if BMI > 30).    Pharmacologic therapy should be used for management of restless legs syndrome only if present and clinically indicated and not based on the presence of periodic limb movements alone.    Diagnostic Codes:    Obstructive Sleep Apnea G47.33  Repetitive Intrusions Into Sleep F51.8    8/23/2019 [facility] Sleep Study (183.0 lbs) - AHI 9.0, RDI 32.3, Supine AHI 16.3, REM AHI 43.3, Low O2 88.7%, Time Spent ?88% 0 minutes / Time Spent ?89% 0 minutes.      _____________________________________   Electronically Signed By: (Sade Liang MD), 9/1/19

## 2019-09-16 NOTE — PROGRESS NOTES
"    HPI: Referred by Dr. Jazzy Greer    Baseline symptoms Snoring-has worsened at this point and wished a sleep study performed on 8/23/19.  Previous study at [facility]: not available, Mireille indicated dx was hypersomnia.    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    Cormorbid conditions: depression/anxiety, has been on serotonin specific reuptake inhibitor for long time, reports constipation has been present for approximately 1 year more, these behaviors have persisted for more than the past year, occur in the mid to late part of sleep. She has been on antidepressants for a long time, currently on wellbutrin, but also abilify and trintellix. Sleeps in lower level, does have access to stairs but has not taken them yet.  No weapons in the lower level.     PSG 8/23/2019 revealed decreased SE at 76%, she declined taking the sleep aid as she said she was tired and al elevated AI of 68.5.  mild obstructive sleep apnea, predominant to use sleep in the supine position.  AHI 9.0, supine AHI 16.3, supine RDI 42.9 rear index was 23.3, snoring was moderate to loud, there was intermittent increase in transient muscle activity present during REM sleep with 2 episodes of body jerking during REM.    Treatment options discussed melatonin 3 mg at bedtime, double to 6 mg at bedtime, then see me back in clinic.  She is concerned that the melatonin would make her sleepy during the daytime.  I reviewed recent literature which indicates this is rare, that someone who acts out their dreams is usually quite exhausted from their sleep.  Discussed seeing neurology, she agrees.  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 Did discuss possible development of neurological disorder down the road such as Parkinson's disease.    Barriers to treatment: indicated concerns about medications, fine with follow through with safety measures, neurology consults, needs to think about tx for her erinn (predominately supine) thinking about MAD or positional device.    Preferences unsure of choice for erinn tx.  Unsure of taking melatonin    Allergies:    Allergies   Allergen Reactions     Phenergan Vc [Promethazine Vc] Visual Disturbance       Medications:    Current Outpatient Medications   Medication Sig Dispense Refill     ARIPiprazole (ABILIFY) 5 MG tablet Pt takes half       buPROPion (WELLBUTRIN XL) 150 MG 24 hr tablet Take 150 mg by mouth every morning.       CALCIUM PO Take  by mouth.       Cholecalciferol (D3 VITAMIN PO)        COD LIVER OIL PO Take  by mouth.       hydrocortisone 2.5 % cream Apply topically 2 times daily To the area on the forehead for up to 2-3 weeks 30 g 1     ketoconazole (NIZORAL) 2 % external shampoo Apply topically three times a week 120 mL 6     ketoconazole (NIZORAL) 2 % shampoo Apply topically to scalp, lather, leave on for 3-5 minutes, then rinse.  Use ever other day 120 mL 12     loratadine (CLARITIN) 10 MG tablet Take 10 mg by mouth daily       Multiple Vitamins-Minerals (MULTIPLE VITAMINS/WOMENS PO) Take  by mouth.       Probiotic Product (PROBIOTIC PO) Take  by mouth.       rizatriptan (MAXALT-MLT) 5 MG ODT Take 1-2 tablets (5-10 mg) by mouth at onset of headache for migraine May repeat dose in 2 hours.  Do not exceed 30 mg in 24 hours 10 tablet 1     vortioxetine (TRINTELLIX) 10 MG tablet Take 1 tablet (10 mg) by mouth daily         Problem List:  Patient Active Problem List    Diagnosis Date Noted     Right-sided low back pain without sciatica 08/28/2018     Priority: Medium     Anxiety associated with depression 08/08/2017     Priority: Medium     Acne 01/08/2015     Priority: Medium     Cystic. RX  spironolactone 1/8/15: start 25 mg x 7day, then 50 mg daily       Hair loss 01/08/2015     Priority: Medium     Plantar fascial fibromatosis 11/10/2014     Priority: Medium     Contraception -- abstinence 09/04/2013     Priority: Medium     Nexplanon info given.       Diaphoresis 09/04/2013     Priority: Medium     Unknown cause.       Depression 02/08/2012     Priority: Medium     Controlled on meds followed by Psych  Problem list name updated by automated process. Provider to review and confirm       OCD (obsessive compulsive disorder) 02/08/2012     Priority: Medium     Controlled on meds w Psychiatrist Tanisha Becker       IBS (irritable bowel syndrome) 02/08/2012     Priority: Medium     Diarrhea-type       Migraines 02/08/2012     Priority: Medium     Exacerbated with menses. Currently 1-2 per month, lasting 3-4 days. Does have aura.       Hyperlipidemia 02/08/2012     Priority: Medium        Past Medical/Surgical History:  Past Medical History:   Diagnosis Date     Abnormal Pap smear 2006?    dysplasia  X 1; paps OK since then...     Arm DVT (deep venous thromboembolism), acute (H) 2008    hx with phlebitis in IVs after a surgery     Breast disorder 2016    2D mammo, then 3D & u/s to r/o = scar tissue from reduction     Cholesterol serum elevated     runs in family     Complication of anesthesia 2000?, 2016    very slow to awake from both gen. anesth. & conscious sedati     Depression      Encounter for insertion of mirena IUD 2011    D/C'd w increased acne     Hypertension 2017&2018    jamin. during exercise, caused by stimulant for depression?     IBS (irritable bowel syndrome) 2002    under control, better with probiotic     Menarche age 15    cycles q 1-2 months Xs 3-4 d w bad cramps     Migraines 2008    a lot better now, may be stress related     OCD (obsessive compulsive disorder)      Seasonal allergies      Past Surgical History:   Procedure Laterality Date     BIOPSY  2000    dermatology-moles      "HEAD & NECK SURGERY  1990?, 2016    wisdom teeth extracted, gum tissue grafting/oral surgery     LASIK Bilateral 2008     MAMMOPLASTY REDUCTION BILATERAL  1197           Physical Examination:  Vitals: /76   Pulse 77   Resp 16   Ht 1.626 m (5' 4.02\")   Wt 84.4 kg (186 lb)   SpO2 100%   BMI 31.91 kg/m    BMI= Body mass index is 31.91 kg/m .         [place] Total Score 9/17/2019   Total score - [place] 13       GENERAL APPEARANCE: alert and mild distress    ASSESSMENT/PLAN: diagnosis of erinn, RBD, in setting of sleepiness and fatigue.  Vague dreams have evolved into dream enactment with an injury and leaving the bed.     1. Snoring ERINN: primarily during supine sleep.  Mireille is thinking about her options, will contact me through my chart.    2. Mild obesity: may play a role in her erinn. Wt loss is encouraged.    3. Htn:: good control today.    4. RBD: behaviors were present when seen by me initially in 2017, but at this time there has been a noted with increase in intensity and an injury along with findings on her PSG. safety measures, recommend Melatonin for RBD, recommend neurology consult.    5. Circadian rhythm: shift work: likely along with dream enactment, is likely adding to sleepiness.    Time spent with patient is 25 minutes, of which >50% was spent in counseling, education and coordination of care.    CC: Jazzy Greer MD.     The above note was dictated using voice recognition software. Although reviewed after completion, some word and grammatical error may remain . Please contact the author for any clarifications.  .                          "

## 2019-09-17 ENCOUNTER — OFFICE VISIT (OUTPATIENT)
Dept: SLEEP MEDICINE | Facility: CLINIC | Age: 44
End: 2019-09-17
Payer: COMMERCIAL

## 2019-09-17 VITALS
HEART RATE: 77 BPM | RESPIRATION RATE: 16 BRPM | SYSTOLIC BLOOD PRESSURE: 112 MMHG | BODY MASS INDEX: 31.76 KG/M2 | DIASTOLIC BLOOD PRESSURE: 76 MMHG | HEIGHT: 64 IN | WEIGHT: 186 LBS | OXYGEN SATURATION: 100 %

## 2019-09-17 DIAGNOSIS — I10 ESSENTIAL HYPERTENSION: ICD-10-CM

## 2019-09-17 DIAGNOSIS — E66.811 OBESITY (BMI 30.0-34.9): ICD-10-CM

## 2019-09-17 DIAGNOSIS — G47.52 RBD (REM BEHAVIORAL DISORDER): ICD-10-CM

## 2019-09-17 DIAGNOSIS — G47.33 OSA (OBSTRUCTIVE SLEEP APNEA): Primary | ICD-10-CM

## 2019-09-17 DIAGNOSIS — G47.26 CIRCADIAN RHYTHM SLEEP DISORDER, SHIFT WORK TYPE: ICD-10-CM

## 2019-09-17 PROCEDURE — 99214 OFFICE O/P EST MOD 30 MIN: CPT | Performed by: NURSE PRACTITIONER

## 2019-09-17 ASSESSMENT — MIFFLIN-ST. JEOR: SCORE: 1478.94

## 2019-09-17 NOTE — PATIENT INSTRUCTIONS
"MY TREATMENT INFORMATION FOR SLEEP APNEA-  Mireille Walls    DOCTOR : MINNIE Rushing CNP  SLEEP CENTER :      MY CONTACT NUMBER:     See all info below  Frequently asked questions:  1. What is Obstructive Sleep Apnea (ALIVIA)? ALIVIA is the most common type of sleep apnea. Apnea means, \"without breath.\"  Apnea is most often caused by narrowing or collapse of the upper airway as muscles relax during sleep.   Almost everyone has occasional apneas. Most people with sleep apnea have had brief interruptions at night frequently for many years.  The severity of sleep apnea is related to how frequent and severe the events are.   2. What are the consequences of ALIVIA? Symptoms include: feeling sleepy during the day, snoring loudly, gasping or stopping of breathing, trouble sleeping, and occasionally morning headaches or heartburn at night.  Sleepiness can be serious and even increase the risk of falling asleep while driving. Other health consequences may include development of high blood pressure and other cardiovascular disease in persons who are susceptible. Untreated ALIVIA  can contribute to heart disease, stroke and diabetes.   3. What are the treatment options? In most situations, sleep apnea is a lifelong disease that must be managed with daily therapy. Medications are not effective for sleep apnea and surgery is generally not considered until other therapies have been tried. Your treatment is your choice . Continuous Positive Airway (CPAP) works right away and is the therapy that is effective in nearly everyone. An oral device to hold your jaw forward is usually the next most reliable option. Other options include postioning devices (to keep you off your back), weight loss, and surgery including a tongue pacing device. There is more detail about some of these options below.    Important tips for using CPAP and similar devices   Know your equipment:  CPAP is continuous positive airway pressure that prevents " obstructive sleep apnea by keeping the throat from collapsing while you are sleeping. In most cases, the device is  smart  and can slowly self-adjusts if your throat collapses and keeps a record every day of how well you are treated-this information is available to you and your care team.  BPAP is bilevel positive airway pressure that keeps your throat open and also assists each breath with a pressure boost to maintain adequate breathing.  Special kinds of BPAP are used in patients who have inadequate breathing from lung or heart disease. In most cases, the device is  smart  and can slowly self-adjusts to assist breathing. Like CPAP, the device keeps a record of how well you are treated.  Your mask is your connection to the device. You get to choose what feels most comfortable and the staff will help to make sure if fits. Here: are some examples of the different masks that are available:       Key points to remember on your journey with sleep apnea:  1. Sleep study.  PAP devices often need to be adjusted during a sleep study to show that they are effective and adjusted right.  2. Good tips to remember: Try wearing just the mask during a quiet time during the day so your body adapts to wearing it. A humidifier is recommended for comfort in most cases to prevent drying of your nose and throat. Allergy medication from your provider may help you if you are having nasal congestion.  3. Getting settled-in. It takes more than one night for most of us to get used to wearing a mask. Try wearing just the mask during a quiet time during the day so your body adapts to wearing it. A humidifier is recommended for comfort in most cases. Our team will work with you carefully on the first day and will be in contact within 4 days and again at 2 and 4 weeks for advice and remote device adjustments. Your therapy is evaluated by the device each day.   4. Use it every night. The more you are able to sleep naturally for 7-8 hours, the  more likely you will have good sleep and to prevent health risks or symptoms from sleep apnea. Even if you use it 4 hours it helps. Occasionally all of us are unable to use a medical therapy, in sleep apnea, it is not dangerous to miss one night.   5. Communicate. Call our skilled team on the number provided on the first day if your visit for problems that make it difficult to wear the device. Over 2 out of 3 patients can learn to wear the device long-term with help from our team. Remember to call our team or your sleep providers if you are unable to wear the device as we may have other solutions for those who cannot adapt to mask CPAP therapy. It is recommended that you sleep your sleep provider within the first 3 months and yearly after that if you are not having problems.   6. Use it for your health. We encourage use of CPAP masks during daytime quiet periods to allow your face and brain to adapt to the sensation of CPAP so that it will be a more natural sensation to awaken to at night or during naps. This can be very useful during the first few weeks or months of adapting to CPAP though it does not help medically to wear CPAP during wakefulness and  should not be used as a strategy just to meet guidelines.  7. Take care of your equipment. Make sure you clean your mask and tubing using directions every day and that your filter and mask are replaced as recommended or if they are not working.     BESIDES CPAP, WHAT OTHER THERAPIES ARE THERE?  Positioning Device  Positioning devices are generally used when sleep apnea is mild and only occurs on your back.This example shows a pillow that straps around the waist. It may be appropriate for those whose sleep study shows milder sleep apnea that occurs primarily when lying flat on one's back. Preliminary studies have shown benefit but effectiveness at home may need to be verified by a home sleep test. These devices are generally not covered by medical insurance.    For  "less expensive options:             [employer] or [employer] for slumber bump pillow, or backpack w/ chest strap stuffed w/ pillow;  or t shirt w/2 pockets, sew in tennis balls  Positioning Device  Positioning devices are generally used when sleep apnea is mild and only occurs on your back.This example shows a pillow that straps around the waist. It may be appropriate for those whose sleep study shows milder sleep apnea that occurs primarily when lying flat on one's back. Preliminary studies have shown benefit but effectiveness at home may need to be verified by a home sleep test. These devices are generally not covered by medical insurance.  Examples of devices that maintain sleeping on the back to prevent snoring and mild sleep apnea.    Belt type body positioner  Http://[employer]/    Electronic reminder  Http://nightshifttherapy.com/  Http://www.[employer]Beisen.au/      Oral Appliance to tell effectiveness: \"snoring stopped\"   Snore lab  What is oral appliance therapy?  An oral appliance device fits on your teeth at night like a retainer used after having braces. The device is made by a specialized dentist and requires several visits over 1-2 months before a manufactured device is made to fit your teeth and is adjusted to prevent your sleep apnea. Once an oral device is working properly, snoring should be improved. A home sleep test may be recommended at that time if to determine whether the sleep apnea is adequately treated.       Some things to remember:  -Oral devices are often, but not always, covered by your medical insurance. Be sure to check with your insurance provider.   -If you are referred for oral therapy, you will be given a list of specialized dentists to consider or you may choose to visit the Web site of the American Academy of Dental Sleep Medicine  -Oral devices are less likely to work if you have severe sleep apnea or are extremely overweight.     More detailed information  An oral appliance is a small " acrylic device that fits over the upper and lower teeth  (similar to a retainer or a mouth guard). This device slightly moves jaw forward, which moves the base of the tongue forward, opens the airway, improves breathing for effective treat snoring and obstructive sleep apnea in perhaps 7 out of 10 people .  The best working devices are custom-made by a dental device  after a mold is made of the teeth 1, 2, 3.  When is an oral appliance indicated?  Oral appliance therapy is recommended as a first-line treatment for patients with primary snoring, mild sleep apnea, and for patients with moderate sleep apnea who prefer appliance therapy to use of CPAP4, 5. Severity of sleep apnea is determined by sleep testing and is based on the number of respiratory events per hour of sleep.   How successful is oral appliance therapy?  The success rate of oral appliance therapy in patients with mild sleep apnea is 75-80% while in patients with moderate sleep apnea it is 50-70%. The chance of success in patients with severe sleep apnea is 40-50%. The research also shows that oral appliances have a beneficial effect on the cardiovascular health of ALIVIA patients at the same magnitude as CPAP therapy7.  Oral appliances should be a second-line treatment in cases of severe sleep apnea, but if not completely successful then a combination therapy utilizing CPAP plus oral appliance therapy may be effective. Oral appliances tend to be effective in a broad range of patients although studies show that the patients who have the highest success are females, younger patients, those with milder disease, and less severe obesity. 3, 6.   Finding a dentist that practices dental sleep medicine  Specific training is available through the American Academy of Dental Sleep Medicine for dentists interested in working in the field of sleep. To find a dentist who is educated in the field of sleep and the use of oral appliances, near you, visit the  Web site of the American Academy of Dental Sleep Medicine.    References  1. Nettie, et al. Objectively measured vs self-reported compliance during oral appliance therapy for sleep-disordered breathing. Chest 2013; 144(5): 4569-0944.  2. Sarahy et al. Objective measurement of compliance during oral appliance therapy for sleep-disordered breathing. Thorax 2013; 68(1): 91-96.  3. Sher et al. Mandibular advancement devices in 620 men and women with ALIVIA and snoring: tolerability and predictors of treatment success. Chest 2004; 125: 4293-9711.  4. Salomón et al. Oral appliances for snoring and ALIVIA: a review. Sleep 2006; 29: 244-262.  5. Morgan et al. Oral appliance treatment for ALIVIA: an update. J Clin Sleep Med 2014; 10(2): 215-227.  6. Megan et al. Predictors of OSAH treatment outcome. J Dent Res 2007; 86: 1771-1263.      Weight Loss:    Weight loss is a long-term strategy that may improve sleep apnea in some patients.    Weight management is a personal decision and the decision should be based on your interest and the potential benefits.  If you are interested in exploring weight loss strategies, the following discussion covers the impact on weight loss on sleep apnea and the approaches that may be successful.    Being overweight does not necessarily mean you will have health consequences.  Those who have BMI over 35 or over 27 with existing medical conditions carries greater risk.   Weight loss decreases severity of sleep apnea in most people with obesity. For those with mild obesity who have developed snoring with weight gain, even 15-30 pound weight loss can improve and occasionally eliminate sleep apnea.  Structured and life-long dietary and health habits are necessary to lose weight and keep healthier weight levels.     Though there may be significant health benefits from weight loss, long-term weight loss is very difficult to achieve- studies show success with dietary management in less  than 10% of people. In addition, substantial weight loss may require years of dietary control and may be difficult if patients have severe obesity. In these cases, surgical management may be considered.  Finally, older individuals who have tolerated obesity without health complications may be less likely to benefit from weight loss strategies.      [unfilled]    Surgery:    Surgery for obstructive sleep apnea is considered generally only when other therapies fail to work. Surgery may be discussed with you if you are having a difficult time tolerating CPAP and or when there is an abnormal structure that requires surgical correction.  Nose and throat surgeries often enlarge the airway to prevent collapse.  Most of these surgeries create pain for 1-2 weeks and up to half of the most common surgeries are not effective throughout life.  You should carefully discuss the benefits and drawbacks to surgery with your sleep provider and surgeon to determine if it is the best solution for you.   More information  Surgery for ALIVIA is directed at areas that are responsible for narrowing or complete obstruction of the airway during sleep.  There are a wide range of procedures available to enlarge and/or stabilize the airway to prevent blockage of breathing in the three major areas where it can occur: the palate, tongue, and nasal regions.  Successful surgical treatment depends on the accurate identification of the factors responsible for obstructive sleep apnea in each person.  A personalized approach is required because there is no single treatment that works well for everyone.  Because of anatomic variation, consultation with an examination by a sleep surgeon is a critical first step in determining what surgical options are best for each patient.  In some cases, examination during sedation may be recommended in order to guide the selection of procedures.  Patients will be counseled about risks and benefits as well as the typical  recovery course after surgery. Surgery is typically not a cure for a person s ALIVIA.  However, surgery will often significantly improve one s ALIVIA severity (termed  success rate ).  Even in the absence of a cure, surgery will decrease the cardiovascular risk associated with OSA7; improve overall quality of life8 (sleepiness, functionality, sleep quality, etc).      Palate Procedures:  Patients with ALIVIA often have narrowing of their airway in the region of their tonsils and uvula.  The goals of palate procedures are to widen the airway in this region as well as to help the tissues resist collapse.  Modern palate procedure techniques focus on tissue conservation and soft tissue rearrangement, rather than tissue removal.  Often the uvula is preserved in this procedure. Residual sleep apnea is common in patient after pharyngoplasty with an average reduction in sleep apnea events of 33%2.      Tongue Procedures:  ExamWhile patients are awake, the muscles that surround the throat are active and keep this region open for breathing. These muscles relax during sleep, allowing the tongue and other structures to collapse and block breathing.  There are several different tongue procedures available.  Selection of a tongue base procedure depends on characteristics seen on physical exam.  Generally, procedures are aimed at removing bulky tissues in this area or preventing the back of the tongue from falling back during sleep.  Success rates for tongue surgery range from 50-62%3.    Hypoglossal Nerve Stimulation:  Hypoglossal nerve stimulation has recently received approval from the [place] Food and Drug Administration for the treatment of obstructive sleep apnea.  This is based on research showing that the system was safe and effective in treating sleep apnea6.  Results showed that the median AHI score decreased 68%, from 29.3 to 9.0. This therapy uses an implant system that senses breathing patterns and delivers mild  stimulation to airway muscles, which keeps the airway open during sleep.  The system consists of three fully implanted components: a small generator (similar in size to a pacemaker), a breathing sensor, and a stimulation lead.  Using a small handheld remote, a patient turns the therapy on before bed and off upon awakening.    Candidates for this device must be greater than 22 years of age, have moderate to severe ALIVIA (AHI between 20-65), BMI less than 32, have tried CPAP/oral appliance without success, and have appropriate upper airway anatomy (determined by a sleep endoscopy performed by Dr. Rachel).    Hypoglossal Nerve Stimulation Pathway:    The sleep surgeon s office will work with the patient through the insurance prior-authorization process (including communications and appeals).    Nasal Procedures:  Nasal obstruction can interfere with nasal breathing during the day and night.  Studies have shown that relief of nasal obstruction can improve the ability of some patients to tolerate positive airway pressure therapy for obstructive sleep apnea1.  Treatment options include medications such as nasal saline, topical corticosteroid and antihistamine sprays, and oral medications such as antihistamines or decongestants. Non-surgical treatments can include external nasal dilators for selected patients. If these are not successful by themselves, surgery can improve the nasal airway either alone or in combination with these other options.      Combination Procedures:  Combination of surgical procedures and other treatments may be recommended, particularly if patients have more than one area of narrowing or persistent positional disease.  The success rate of combination surgery ranges from 66-80%2,3.    References  1. Bea MARCUS. The Role of the Nose in Snoring and Obstructive Sleep Apnoea: An Update.  Eur Arch Otorhinolaryngol. 2011; 268: 1365-73.  2.  Haydee SM; Ag JA; Candelario JR; Pallanch JF; Paulino MONTALVO; Billie SG;  Dann MENDES. Surgical modifications of the upper airway for obstructive sleep apnea in adults: a systematic review and meta-analysis. SLEEP 2010;33(10):5155-6682. Mallory VILLEGAS. Hypopharyngeal surgery in obstructive sleep apnea: an evidence-based medicine review.  Arch Otolaryngol Head Neck Surg. 2006 Feb;132(2):206-13.  3. Lester YH1, Cindy Y, Tung NIDHI. The efficacy of anatomically based multilevel surgery for obstructive sleep apnea. Otolaryngol Head Neck Surg. 2003 Oct;129(4):327-35.  4. Mallory VILLEGAS, Goldberg A. Hypopharyngeal Surgery in Obstructive Sleep Apnea: An Evidence-Based Medicine Review. Arch Otolaryngol Head Neck Surg. 2006 Feb;132(2):206-13.  5. Sharlene BENAVIDEZ et al. Upper-Airway Stimulation for Obstructive Sleep Apnea.  N Engl J Med. 2014 Jan 9;370(2):139-49.  6. Kirill Y et al. Increased Incidence of Cardiovascular Disease in Middle-aged Men with Obstructive Sleep Apnea. Am J Respir Crit Care Med; 2002 166: 159-165  7. Yañez EM et al. Studying Life Effects and Effectiveness of Palatopharyngoplasty (SLEEP) study: Subjective Outcomes of Isolated Uvulopalatopharyngoplasty. Otolaryngol Head Neck Surg. 2011; 144: 623-631.              ---------------------------------------------------------------------------------------------------------------------------------------------------------------------------------  Your BMI is Body mass index is 31.91 kg/m .  Weight management is a personal decision.  If you are interested in exploring weight loss strategies, the following discussion covers the approaches that may be successful. Body mass index (BMI) is one way to tell whether you are at a healthy weight, overweight, or obese. It measures your weight in relation to your height.  A BMI of 18.5 to 24.9 is in the healthy range. A person with a BMI of 25 to 29.9 is considered overweight, and someone with a BMI of 30 or greater is considered obese. More than two-thirds of American adults are considered overweight or  obese.  Being overweight or obese increases the risk for further weight gain. Excess weight may lead to heart disease and diabetes.  Creating and following plans for healthy eating and physical activity may help you improve your health.  Weight control is part of healthy lifestyle and includes exercise, emotional health, and healthy eating habits. Careful eating habits lifelong are the mainstay of weight control. Though there are significant health benefits from weight loss, long-term weight loss with diet alone may be very difficult to achieve- studies show long-term success with dietary management in less than 10% of people. Attaining a healthy weight may be especially difficult to achieve in those with severe obesity. In some cases, medications, devices and surgical management might be considered.  What can you do?  If you are overweight or obese and are interested in methods for weight loss, you should discuss this with your provider.     Consider reducing daily calorie intake by 500 calories.     Keep a food journal.     Avoiding skipping meals, consider cutting portions instead.    Diet combined with exercise helps maintain muscle while optimizing fat loss. Strength training is particularly important for building and maintaining muscle mass. Exercise helps reduce stress, increase energy, and improves fitness. Increasing exercise without diet control, however, may not burn enough calories to loose weight.       Start walking three days a week 10-20 minutes at a time    Work towards walking thirty minutes five days a week     Eventually, increase the speed of your walking for 1-2 minutes at time    In addition, we recommend that you review healthy lifestyles and methods for weight loss available through the National Institutes of Health patient information sites:  http://win.niddk.nih.gov/publications/index.htm    And look into health and wellness programs that may be available through your health insurance  provider, employer, local community center, or [employer].    Weight management plan: Patient was referred to their PCP to discuss a diet and exercise plan.    -------------------------------------------------------------------------------------------------------------------------------------------------------------------------------        RBD    Melatonin 3 mg at bedtime nightly,  Increase 6 mg, then see me back in follow up    Neurology appt    And see below:    We never know when a person may progress from acting out dreams while lying in bed, to leaving the bed or jumping from bed.  These recommendations should be considered...    Safety measures should be taken when patients are acting out their dreams.  We encourage bed partners to sleep separately for their own safety, till we have more data on cause of the behavior.    Window and door locks ([employer] /or [employer]) , along with clearing the bedroom of objects that could be used as a weapon (lamps, hand held mirrors) or firearms.  Placing the bed on the floor can be protective to prevent falls or minimize injury from diving from bed.

## 2019-09-17 NOTE — Clinical Note
Please see sleep consultation note from today.Brenna Avelar, MINNIE, CNP-[facility]

## 2019-09-19 ENCOUNTER — MYC MEDICAL ADVICE (OUTPATIENT)
Dept: SLEEP MEDICINE | Facility: CLINIC | Age: 44
End: 2019-09-19

## 2019-09-19 PROBLEM — G47.33 OSA (OBSTRUCTIVE SLEEP APNEA): Status: ACTIVE | Noted: 2019-09-19

## 2019-09-19 PROBLEM — I10 ESSENTIAL HYPERTENSION: Status: ACTIVE | Noted: 2019-09-19

## 2019-09-30 NOTE — TELEPHONE ENCOUNTER
Left message with the patient giving her the number to neurology. Patient informed to call the clinic back with any other questions.    Caitlyn Lucas [facility] ~[place]

## 2019-10-03 ENCOUNTER — HEALTH MAINTENANCE LETTER (OUTPATIENT)
Age: 44
End: 2019-10-03

## 2020-01-30 ENCOUNTER — OFFICE VISIT (OUTPATIENT)
Dept: DERMATOLOGY | Facility: CLINIC | Age: 45
End: 2020-01-30
Payer: COMMERCIAL

## 2020-01-30 DIAGNOSIS — L82.0 SEBORRHEIC KERATOSIS, INFLAMED: ICD-10-CM

## 2020-01-30 DIAGNOSIS — D48.9 NEOPLASM OF UNCERTAIN BEHAVIOR: Primary | ICD-10-CM

## 2020-01-30 DIAGNOSIS — Z51.81 ENCOUNTER FOR THERAPEUTIC DRUG MONITORING: ICD-10-CM

## 2020-01-30 DIAGNOSIS — D22.9 MULTIPLE BENIGN NEVI: ICD-10-CM

## 2020-01-30 DIAGNOSIS — L70.0 ACNE VULGARIS: ICD-10-CM

## 2020-01-30 LAB — POTASSIUM SERPL-SCNC: 4.3 MMOL/L (ref 3.4–5.3)

## 2020-01-30 RX ORDER — SPIRONOLACTONE 25 MG/1
TABLET ORAL
Qty: 60 TABLET | Refills: 3 | Status: SHIPPED | OUTPATIENT
Start: 2020-01-30 | End: 2020-03-23

## 2020-01-30 ASSESSMENT — PATIENT HEALTH QUESTIONNAIRE - PHQ9: SUM OF ALL RESPONSES TO PHQ QUESTIONS 1-9: 0

## 2020-01-30 ASSESSMENT — PAIN SCALES - GENERAL: PAINLEVEL: NO PAIN (0)

## 2020-01-30 NOTE — PATIENT INSTRUCTIONS
Skin looks good today!  We will check one spot on back.    For acne, start spironolactone 50 mg daily for 1 week then increase to 100 mg daily if tolerated. This is a medication used in high doses for blood pressure but in lower doses for acne due to its anti-male hormone effects. Main side effects are dizziness, menstrual spotting, breast tenderness, high potassium, and if you were to become pregnant, a male fetus could become a female fetus. It is very important to avoid pregnancy while on this medication and to stop it immediately if you do become pregnant.    Return in 3 months, sooner if concerns.    Wound Care After a Biopsy    What is a skin biopsy?  A skin biopsy allows the doctor to examine a very small piece of tissue under the microscope to determine the diagnosis and the best treatment for the skin condition. A local anesthetic (numbing medicine)  is injected with a very small needle into the skin area to be tested. A small piece of skin is taken from the area. Sometimes a suture (stitch) is used.     What are the risks of a skin biopsy?  I will experience scar, bleeding, swelling, pain, crusting and redness. I may experience incomplete removal or recurrence. Risks of this procedure are excessive bleeding, bruising, infection, nerve damage, numbness, thick (hypertrophic or keloidal) scar and non-diagnostic biopsy.    How should I care for my wound for the first 24 hours?    Keep the wound dry and covered for 24 hours    If it bleeds, hold direct pressure on the area for 15 minutes. If bleeding does not stop then go to the emergency room    Avoid strenuous exercise the first 1-2 days or as your doctor instructs you    How should I care for the wound after 24 hours?    After 24 hours, remove the bandage    You may bathe or shower as normal    If you had a scalp biopsy, you can shampoo as usual and can use shower water to clean the biopsy site daily    Clean the wound twice a day with gentle soap and  water    Do not scrub, be gentle    Apply white petroleum/Vaseline after cleaning the wound with a cotton swab or a clean finger, and keep the site covered with a Bandaid /bandage. Bandages are not necessary with a scalp biopsy    If you are unable to cover the site with a Bandaid /bandage, re-apply ointment 2-3 times a day to keep the site moist. Moisture will help with healing    Avoid strenuous activity for first 1-2 days    Avoid lakes, rivers, pools, and oceans until the stitches are removed or the site is healed    How do I clean my wound?    Wash hands thoroughly with soap or use hand  before all wound care    Clean the wound with gentle soap and water    Apply white petroleum/Vaseline  to wound after it is clean    Replace the Bandaid /bandage to keep the wound covered for the first few days or as instructed by your doctor    If you had a scalp biopsy, warm shower water to the area on a daily basis should suffice    What should I use to clean my wound?     Cotton-tipped applicators (Qtips )    White petroleum jelly (Vaseline ). Use a clean new container and use Q-tips to apply.    Bandaids   as needed    Gentle soap     How should I care for my wound long term?    Do not get your wound dirty    Keep up with wound care for one week or until the area is healed.    A small scab will form and fall off by itself when the area is completely healed. The area will be red and will become pink in color as it heals. Sun protection is very important for how your scar will turn out. Sunscreen with an SPF 30 or greater is recommended once the area is healed.    If you have stitches, stitches need to be removed in 14 days. You may return to our clinic for this or you may have it done locally at your doctor s office.    You should have some soreness but it should be mild and slowly go away over several days. Talk to your doctor about using tylenol for pain,    When should I call my doctor?  If you have increased:      Pain or swelling    Pus or drainage (clear or slightly yellow drainage is ok)    Temperature over 100F    Spreading redness or warmth around wound    When will I hear about my results?  The biopsy results can take 2-3 weeks to come back. The clinic will call you with the results, send you a [employer] message, or have you schedule a follow-up clinic or phone time to discuss the results. Contact our clinics if you do not hear from us in 3 weeks.     Who should I call with questions?    [facility]: [phone]     [facility]: [phone]    For urgent needs outside of business hours call the [facility] at [phone] and ask for the dermatology resident on call

## 2020-01-30 NOTE — PROGRESS NOTES
"[facility] Dermatology Note      Dermatology Problem List:  1.  Seborrheic dermatitis: Ketoconazole shampoo 3 times weekly  2.  Nummular dermatitis of the central forehead with a negative KOH scraping on July 11, 2019: Hydrocortisone 2.5% cream twice daily for 2 to 3 weeks  3.  Irritated seborrheic keratosis of the left forearm status post cryotherapy on July 11, 2018    Encounter Date: Jan 30, 2020    CC:  Chief Complaint   Patient presents with     Derm Problem     full body skin check and concerned about cystic acne       History of Present Illness:  Ms. Mireille Walls is a 45 year old female who presents today for an evaluation of acne. She was last seen by Dr. Hubbard on 7/11/19 where she started hydrocortisone 2.5% cream BID for suspected nummular dermatitis of forehead. She also received cryotherapy for an SK on L forearm. Today she presents for acne. Had previously been on OCPs for years but then stopped last year after finding out she had FVL. Since then, her acne has been worse. It is very hormonal. She has not tried anything for it it yet. Also notes a raised spot on her left arm which is asymptomatic. No personal history of skin cancer. Mom had \"pre-cancerous\" spots. No other painful, bleeding, changing, or non-healing lesions. Patient is otherwise feeling well, no additional skin concerns.  Patient reports getting many sunburns as a child.     Past Medical History:   Patient Active Problem List   Diagnosis     Depression     OCD (obsessive compulsive disorder)     IBS (irritable bowel syndrome)     Migraines     Hyperlipidemia     Contraception -- abstinence     Diaphoresis     Plantar fascial fibromatosis     Acne     Hair loss     Anxiety associated with depression     Right-sided low back pain without sciatica     ALIVIA (obstructive sleep apnea)     Essential hypertension     Past Medical History:   Diagnosis Date     Abnormal Pap smear 2006?    dysplasia  X 1; paps OK since then...     " Arm DVT (deep venous thromboembolism), acute (H) 2008    hx with phlebitis in IVs after a surgery     Breast disorder 2016    2D mammo, then 3D & u/s to r/o = scar tissue from reduction     Cholesterol serum elevated     runs in family     Complication of anesthesia 2000?, 2016    very slow to awake from both gen. anesth. & conscious sedati     Depression      Encounter for insertion of mirena IUD 2011    D/C'd w increased acne     Hypertension 2017&2018    jamin. during exercise, caused by stimulant for depression?     IBS (irritable bowel syndrome) 2002    under control, better with probiotic     Menarche age 15    cycles q 1-2 months Xs 3-4 d w bad cramps     Migraines 2008    a lot better now, may be stress related     OCD (obsessive compulsive disorder)      Seasonal allergies      Past Surgical History:   Procedure Laterality Date     BIOPSY  2000    dermatology-moles     HEAD & NECK SURGERY  1990?, 2016    wisdom teeth extracted, gum tissue grafting/oral surgery     LASIK Bilateral 2008     MAMMOPLASTY REDUCTION BILATERAL  1197       Social History:  Patient reports that she has never smoked. She has never used smokeless tobacco. She reports that she does not drink alcohol or use drugs.  ***    Family History:  Family History   Problem Relation Age of Onset     Cancer Father 57        bladder ca     Genitourinary Problems Father         Polycystic kidney     Other Cancer Father         bladder cancer     Genetic Disorder Father         polycystic kidneys     Cancer - colorectal Paternal Grandmother 75     Colon Cancer Paternal Grandmother      Mental Illness Other         SeeBelow     Cancer Mother         skin cancer     Hyperlipidemia Mother         runs in Mom's family-several     Other Cancer Mother         skin cancer     Asthma Mother         runs in Mom's family     Skin Cancer Mother      Alcohol/Drug Paternal Aunt         Xs 2     Thyroid Disease Maternal Grandmother         hypothyroid     Obesity  Maternal Grandmother      Coronary Artery Disease Maternal Grandfather         heart attack     Mental Illness Other         Schizophrenia     Mental Illness Cousin         OCD     Depression Other         chronic and episodic     Mental Illness Other         ChronicMajorDepression     Anxiety Disorder Other         gen. anxiety disorder     Mental Illness Other         OCD     Substance Abuse Other         alcoholism-runs in Dad's family     Genetic Disorder Other         polycystic kidneys     Genetic Disorder Brother         [id number]     Asthma Brother      Deep Vein Thrombosis (DVT) Brother      Genetic Disorder Other         [id number]     C.A.D. No family hx of      Hypertension No family hx of      Breast Cancer No family hx of        Medications:  Current Outpatient Medications   Medication Sig Dispense Refill     buPROPion (WELLBUTRIN XL) 150 MG 24 hr tablet Take 150 mg by mouth every morning.       CALCIUM PO Take  by mouth.       Cholecalciferol (D3 VITAMIN PO)        COD LIVER OIL PO Take  by mouth.       hydrocortisone 2.5 % cream Apply topically 2 times daily To the area on the forehead for up to 2-3 weeks 30 g 1     loratadine (CLARITIN) 10 MG tablet Take 10 mg by mouth daily       Multiple Vitamins-Minerals (MULTIPLE VITAMINS/WOMENS PO) Take  by mouth.       Probiotic Product (PROBIOTIC PO) Take  by mouth.       rizatriptan (MAXALT-MLT) 5 MG ODT Take 1-2 tablets (5-10 mg) by mouth at onset of headache for migraine May repeat dose in 2 hours.  Do not exceed 30 mg in 24 hours 10 tablet 1     vortioxetine (TRINTELLIX) 10 MG tablet Take 1 tablet (10 mg) by mouth daily       ARIPiprazole (ABILIFY) 5 MG tablet Pt takes half       ketoconazole (NIZORAL) 2 % shampoo Apply topically to scalp, lather, leave on for 3-5 minutes, then rinse.  Use ever other day 120 mL 12       Allergies   Allergen Reactions     Phenergan Vc [Promethazine Vc] Visual Disturbance       Review of Systems:  -Skin Establ Pt:  The patient denies any new rash, pruritus, or lesions that are symptomatic, changing or bleeding, except as per HPI.  -Constitutional: The patient is feeling generally well.    Physical exam:  Vitals: There were no vitals taken for this visit.  GEN: This is a well developed, well-nourished female in no acute distress, in a pleasant mood.    SKIN: Total skin excluding the undergarment areas was performed. The exam included the head/face, neck, both arms, chest, back, abdomen, both legs, digits and/or nails.   - L posterior shoulder light pink ~4 mm macule dermoscopy with hair pin vessels  - There is a waxy stuck on tan to brown papule on the L arm.  - Lower jaw line and chin with several acieform papules and few light pink macules   - No other lesions of concern on areas examined.   - R anterior chest ~ 4 mm slightly sindy brown macule dermoscopy with reticulate pigment network  - L anterior chest and axilla with segmental light brown macule and medium brown macules within   - central frontal scalp mild thinning no erythema or scale        Impression/Plan:  1. 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 The differential diagnosis includes BCC v BLK. Discussed option of monitoring or a biopsy today. Patient elects to biopsy.    - Shave biopsy:  After discussion of benefits and risks including but not limited to bleeding/bruising, pain/swelling, infection, scar, incomplete removal, nerve damage/numbness, recurrence, and non-diagnostic biopsy, written consent, verbal consent and photographs were obtained. Time-out was performed. The area was cleaned with isopropyl alcohol. 0.5ml of 1% lidocaine with 1:100,000 epinephrine was injected to obtain adequate anesthesia. A shave biopsy was performed. Hemostasis was achieved with aluminium chloride. Vaseline and a sterile dressing were applied. The patient tolerated the procedure and no complications were noted. The patient was provided with verbal and written  post care instructions.    2. Acne vulgaris. Discussed several treatment options today including spironolactone due to occurrence with menstruation. Consider Accutane for future if spironolactone does not work.  - Start spironolactone 100 mg daily. This is a medication used in high doses for blood pressure but in lower doses for acne due to its anti-male hormone effects. Main side effects are dizziness, menstrual spotting, breast tenderness, high potassium, and if you were to become pregnant, a male fetus could become a female fetus. It is very important to avoid pregnancy while on this medication and to stop it immediately if you do become pregnant.     2. Lesion to monitor, R anterior chest.  She reports has been present for many years and is unchanging. Recheck at follow up.   -  No further intervention required. Patient to report changes. Will recheck at follow up in 3 months.     2. Seborrheic keratosis, non irritated. Discussed the natural history and benign nature of this lesion. Reassurance provided that no additional treatment is necessary.   - No further intervention required. Patient to report changes.     3. Non scarring alopecia.Suspect female pattern hair loss. Natural history and etiology discussed. Discussed spironolactone may help. Recheck at next visit.       Follow-up in 3 months, earlier for new or changing lesions.       Staff Involved:    Scribe Disclosure  I, Mia Monique, am serving as a scribe to document services personally performed by Dr. Veronique Mg MD, based on data collection and the provider's statements to me.     ***

## 2020-01-30 NOTE — LETTER
"1/30/2020       RE: Mireille Walls  [address] [place] [address]     Dear Colleague,    Thank you for referring your patient, Mireille Walls, to the [facility] DERMATOLOGY at [facility]. Please see a copy of my visit note below.    [facility] Dermatology Note      Dermatology Problem List:  1. Seborrheic dermatitis: Ketoconazole shampoo 3 times weekly  2. Nummular dermatitis of the central forehead with a negative KOH scraping on July 11, 2019: Hydrocortisone 2.5% cream twice daily for 2 to 3 weeks  3. Acne vulgaris, hormonal.  - Spironolactone 100 mg every day, started 1/30/20  #. Neoplasm of uncertain behavior, L posterior shoulder, s/p shave bx 1/30/2020     Encounter Date: Jan 30, 2020    CC:  Chief Complaint   Patient presents with     Derm Problem     full body skin check and concerned about cystic acne       History of Present Illness:  Ms. Mireille Walls is a 45 year old female who presents today for an evaluation of acne. She was last seen by Dr. Hubbard on 7/11/19 where she started hydrocortisone 2.5% cream BID for suspected nummular dermatitis of forehead. She also received cryotherapy for an SK on L forearm. Today she presents for acne. 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 Since then, her acne has been worse. It is very hormonal. She has not tried anything for it it yet. Also notes a raised spot on her left arm which is asymptomatic. No personal history of skin cancer. Mom had \"pre-cancerous\" spots. No other painful, bleeding, changing, or non-healing lesions. Patient reports getting many sunburns as a child. Patient is otherwise feeling well, no additional skin concerns.    She also notes hair thinning on frontal scalp and she tries to cover it up with her bangs.    Past Medical History:   Patient Active Problem List   Diagnosis     Depression     OCD (obsessive compulsive " disorder)     IBS (irritable bowel syndrome)     Migraines     Hyperlipidemia     Contraception -- abstinence     Diaphoresis     Plantar fascial fibromatosis     Acne     Hair loss     Anxiety associated with depression     Right-sided low back pain without sciatica     ALIVIA (obstructive sleep apnea)     Essential hypertension     Past Medical History:   Diagnosis Date     Abnormal Pap smear 2006?    dysplasia  X 1; paps OK since then...     Arm DVT (deep venous thromboembolism), acute (H) 2008    hx with phlebitis in IVs after a surgery     Breast disorder 2016    2D mammo, then 3D & u/s to r/o = scar tissue from reduction     Cholesterol serum elevated     runs in family     Complication of anesthesia 2000?, 2016    very slow to awake from both gen. anesth. & conscious sedati     Depression      Encounter for insertion of mirena IUD 2011    D/C'd w increased acne     Hypertension 2017&2018    jamin. during exercise, caused by stimulant for depression?     IBS (irritable bowel syndrome) 2002    under control, better with probiotic     Menarche age 15    cycles q 1-2 months Xs 3-4 d w bad cramps     Migraines 2008    a lot better now, may be stress related     OCD (obsessive compulsive disorder)      Seasonal allergies      Past Surgical History:   Procedure Laterality Date     BIOPSY  2000    dermatology-moles     HEAD & NECK SURGERY  1990?, 2016    wisdom teeth extracted, gum tissue grafting/oral surgery     LASIK Bilateral 2008     MAMMOPLASTY REDUCTION BILATERAL  1197       Social History:  Patient reports that she has never smoked. She has never used smokeless tobacco. She reports that she does not drink alcohol or use drugs.    Family History:  Family History   Problem Relation Age of Onset     Cancer Father 57        bladder ca     Genitourinary Problems Father         Polycystic kidney     Other Cancer Father         bladder cancer     Genetic Disorder Father         polycystic kidneys     Cancer - colorectal  Paternal Grandmother 75     Colon Cancer Paternal Grandmother      Mental Illness Other         SeeBelow     Cancer Mother         skin cancer     Hyperlipidemia Mother         runs in Mom's family-several     Other Cancer Mother         skin cancer     Asthma Mother         runs in Mom's family     Skin Cancer Mother      Alcohol/Drug Paternal Aunt         Xs 2     Thyroid Disease Maternal Grandmother         hypothyroid     Obesity Maternal Grandmother      Coronary Artery Disease Maternal Grandfather         heart attack     Mental Illness Other         Schizophrenia     Mental Illness Cousin         OCD     Depression Other         chronic and episodic     Mental Illness Other         ChronicMajorDepression     Anxiety Disorder Other         gen. anxiety disorder     Mental Illness Other         OCD     Substance Abuse Other         alcoholism-runs in Dad's family     Genetic Disorder Other         polycystic kidneys     Genetic Disorder Brother         [id number]     Asthma Brother      Deep Vein Thrombosis (DVT) Brother      Genetic Disorder Other         [id number]     C.A.D. No family hx of      Hypertension No family hx of      Breast Cancer No family hx of        Medications:  Current Outpatient Medications   Medication Sig Dispense Refill     buPROPion (WELLBUTRIN XL) 150 MG 24 hr tablet Take 150 mg by mouth every morning.       CALCIUM PO Take  by mouth.       Cholecalciferol (D3 VITAMIN PO)        COD LIVER OIL PO Take  by mouth.       hydrocortisone 2.5 % cream Apply topically 2 times daily To the area on the forehead for up to 2-3 weeks 30 g 1     loratadine (CLARITIN) 10 MG tablet Take 10 mg by mouth daily       Multiple Vitamins-Minerals (MULTIPLE VITAMINS/WOMENS PO) Take  by mouth.       Probiotic Product (PROBIOTIC PO) Take  by mouth.       rizatriptan (MAXALT-MLT) 5 MG ODT Take 1-2 tablets (5-10 mg) by mouth at onset of headache for migraine May repeat dose in 2 hours.  Do not exceed 30 mg  in 24 hours 10 tablet 1     vortioxetine (TRINTELLIX) 10 MG tablet Take 1 tablet (10 mg) by mouth daily       ARIPiprazole (ABILIFY) 5 MG tablet Pt takes half       ketoconazole (NIZORAL) 2 % shampoo Apply topically to scalp, lather, leave on for 3-5 minutes, then rinse.  Use ever other day 120 mL 12       Allergies   Allergen Reactions     Phenergan Vc [Promethazine Vc] Visual Disturbance       Review of Systems:  -Skin Establ Pt: The patient denies any new rash, pruritus, or lesions that are symptomatic, changing or bleeding, except as per HPI.  -Constitutional: The patient is feeling generally well.    Physical exam:  GEN: This is a well developed, well-nourished female in no acute distress, in a pleasant mood.    SKIN: Total skin excluding the undergarment areas was performed. The exam included the head/face, neck, both arms, chest, back, abdomen, both legs, digits and/or nails.   - L posterior shoulder light pink ~4 mm macule. Dermoscopy with hair pin vessels.  - There is a waxy stuck on tan to brown papule on the L arm.  - Lower jaw line and chin with several acneiform papules and few light pink macules .  - R anterior chest ~ 4 mm slightly stellate brown macule. Dermoscopy with reticulate pigment network.  - L anterior chest and axilla with segmental light brown macule and medium brown macules within.  - Central frontal scalp mild thinning, no erythema or scale.  - No other lesions of concern on areas examined.           Impression/Plan:  1. 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 The differential diagnosis includes BCC v BLK. Discussed option of monitoring or a biopsy today. Patient elects to biopsy.    - Shave biopsy:  After discussion of benefits and risks including but not limited to bleeding/bruising, pain/swelling, infection, scar, incomplete removal, nerve damage/numbness, recurrence, and non-diagnostic biopsy, written consent, verbal consent and photographs were obtained. Time-out  was performed. The area was cleaned with isopropyl alcohol. 0.5ml of 1% lidocaine with 1:100,000 epinephrine was injected to obtain adequate anesthesia. A shave biopsy was performed. Hemostasis was achieved with aluminium chloride. Vaseline and a sterile dressing were applied. The patient tolerated the procedure and no complications were noted. The patient was provided with verbal and written post care instructions.    2. Acne vulgaris. Discussed several treatment options today including spironolactone due to association with menstruation. She is interested in this today. Would also consider Accutane for future if spironolactone does not work.  - Check potassium today.  - Start spironolactone 50 mg daily for 1 week. If tolerated, continue on 100 mg daily. This is a medication used in high doses for blood pressure but in lower doses for acne due to its anti-male hormone effects. Main side effects are dizziness, menstrual spotting, breast tenderness, high potassium, and if you were to become pregnant, a male fetus could become a female fetus. It is very important to avoid pregnancy while on this medication and to stop it immediately if you do become pregnant.     3. Lesion to monitor, R anterior chest.  She reports has been present for many years and is unchanging. Recheck at follow up.   -  No further intervention required. Patient to report changes. Will recheck at follow up in 3 months.     4. Seborrheic keratosis, non irritated. Discussed the natural history and benign nature of this lesion. Reassurance provided that no additional treatment is necessary.   - No further intervention required. Patient to report changes.     3. Non scarring alopecia.Suspect female pattern hair loss. Natural history and etiology discussed. Discussed spironolactone may help. Recheck at next visit.       Follow-up in 3 months, earlier for new or changing lesions.       Staff Involved:    Scribe Disclosure  I, Mia Monique am  serving as a scribe to document services personally performed by Dr. Veronique Mg MD, based on data collection and the provider's statements to me.     Provider Disclosure:   The documentation recorded by the scribe accurately reflects the services I personally performed and the decisions made by me.    Veronique Mg MD    Department of Dermatology  [facility]: Phone: [phone], Fax: [phone]

## 2020-01-30 NOTE — NURSING NOTE
Chief Complaint   Patient presents with     Derm Problem     full body skin check and concerned about cystic acne     Tiffani Melara, EMT

## 2020-01-30 NOTE — NURSING NOTE
Lidocaine-epinephrine 1-1:763049 % injection   0.5mL once for one use, starting 1/30/2020 ending 1/30/2020,  2mL disp, R-0, injection  Injected by Josephine Pardo CMA

## 2020-01-30 NOTE — PROGRESS NOTES
"[facility] Dermatology Note      Dermatology Problem List:  1. Seborrheic dermatitis: Ketoconazole shampoo 3 times weekly  2. Nummular dermatitis of the central forehead with a negative KOH scraping on July 11, 2019: Hydrocortisone 2.5% cream twice daily for 2 to 3 weeks  3. Acne vulgaris, hormonal.  - Spironolactone 100 mg every day, started 1/30/20  #. Neoplasm of uncertain behavior, L posterior shoulder, s/p shave bx 1/30/2020     Encounter Date: Jan 30, 2020    CC:  Chief Complaint   Patient presents with     Derm Problem     full body skin check and concerned about cystic acne       History of Present Illness:  Ms. Mireille Walls is a 45 year old female who presents today for an evaluation of acne. She was last seen by Dr. Hubbard on 7/11/19 where she started hydrocortisone 2.5% cream BID for suspected nummular dermatitis of forehead. She also received cryotherapy for an SK on L forearm. Today she presents for acne. 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 Since then, her acne has been worse. It is very hormonal. She has not tried anything for it it yet. Also notes a raised spot on her left arm which is asymptomatic. No personal history of skin cancer. Mom had \"pre-cancerous\" spots. No other painful, bleeding, changing, or non-healing lesions. Patient reports getting many sunburns as a child. Patient is otherwise feeling well, no additional skin concerns.    She also notes hair thinning on frontal scalp and she tries to cover it up with her bangs.    Past Medical History:   Patient Active Problem List   Diagnosis     Depression     OCD (obsessive compulsive disorder)     IBS (irritable bowel syndrome)     Migraines     Hyperlipidemia     Contraception -- abstinence     Diaphoresis     Plantar fascial fibromatosis     Acne     Hair loss     Anxiety associated with depression     Right-sided low back pain without sciatica     ALIVIA " (obstructive sleep apnea)     Essential hypertension     Past Medical History:   Diagnosis Date     Abnormal Pap smear 2006?    dysplasia  X 1; paps OK since then...     Arm DVT (deep venous thromboembolism), acute (H) 2008    hx with phlebitis in IVs after a surgery     Breast disorder 2016    2D mammo, then 3D & u/s to r/o = scar tissue from reduction     Cholesterol serum elevated     runs in family     Complication of anesthesia 2000?, 2016    very slow to awake from both gen. anesth. & conscious sedati     Depression      Encounter for insertion of mirena IUD 2011    D/C'd w increased acne     Hypertension 2017&2018    jamin. during exercise, caused by stimulant for depression?     IBS (irritable bowel syndrome) 2002    under control, better with probiotic     Menarche age 15    cycles q 1-2 months Xs 3-4 d w bad cramps     Migraines 2008    a lot better now, may be stress related     OCD (obsessive compulsive disorder)      Seasonal allergies      Past Surgical History:   Procedure Laterality Date     BIOPSY  2000    dermatology-moles     HEAD & NECK SURGERY  1990?, 2016    wisdom teeth extracted, gum tissue grafting/oral surgery     LASIK Bilateral 2008     MAMMOPLASTY REDUCTION BILATERAL  1197       Social History:  Patient reports that she has never smoked. She has never used smokeless tobacco. She reports that she does not drink alcohol or use drugs.    Family History:  Family History   Problem Relation Age of Onset     Cancer Father 57        bladder ca     Genitourinary Problems Father         Polycystic kidney     Other Cancer Father         bladder cancer     Genetic Disorder Father         polycystic kidneys     Cancer - colorectal Paternal Grandmother 75     Colon Cancer Paternal Grandmother      Mental Illness Other         SeeBelow     Cancer Mother         skin cancer     Hyperlipidemia Mother         runs in Mom's family-several     Other Cancer Mother         skin cancer     Asthma Mother          runs in Mom's family     Skin Cancer Mother      Alcohol/Drug Paternal Aunt         Xs 2     Thyroid Disease Maternal Grandmother         hypothyroid     Obesity Maternal Grandmother      Coronary Artery Disease Maternal Grandfather         heart attack     Mental Illness Other         Schizophrenia     Mental Illness Cousin         OCD     Depression Other         chronic and episodic     Mental Illness Other         ChronicMajorDepression     Anxiety Disorder Other         gen. anxiety disorder     Mental Illness Other         OCD     Substance Abuse Other         alcoholism-runs in Dad's family     Genetic Disorder Other         polycystic kidneys     Genetic Disorder Brother         [id number]     Asthma Brother      Deep Vein Thrombosis (DVT) Brother      Genetic Disorder Other         [id number]     C.A.D. No family hx of      Hypertension No family hx of      Breast Cancer No family hx of        Medications:  Current Outpatient Medications   Medication Sig Dispense Refill     buPROPion (WELLBUTRIN XL) 150 MG 24 hr tablet Take 150 mg by mouth every morning.       CALCIUM PO Take  by mouth.       Cholecalciferol (D3 VITAMIN PO)        COD LIVER OIL PO Take  by mouth.       hydrocortisone 2.5 % cream Apply topically 2 times daily To the area on the forehead for up to 2-3 weeks 30 g 1     loratadine (CLARITIN) 10 MG tablet Take 10 mg by mouth daily       Multiple Vitamins-Minerals (MULTIPLE VITAMINS/WOMENS PO) Take  by mouth.       Probiotic Product (PROBIOTIC PO) Take  by mouth.       rizatriptan (MAXALT-MLT) 5 MG ODT Take 1-2 tablets (5-10 mg) by mouth at onset of headache for migraine May repeat dose in 2 hours.  Do not exceed 30 mg in 24 hours 10 tablet 1     vortioxetine (TRINTELLIX) 10 MG tablet Take 1 tablet (10 mg) by mouth daily       ARIPiprazole (ABILIFY) 5 MG tablet Pt takes half       ketoconazole (NIZORAL) 2 % shampoo Apply topically to scalp, lather, leave on for 3-5 minutes, then rinse.  Use  ever other day 120 mL 12       Allergies   Allergen Reactions     Phenergan Vc [Promethazine Vc] Visual Disturbance       Review of Systems:  -Skin Establ Pt: The patient denies any new rash, pruritus, or lesions that are symptomatic, changing or bleeding, except as per HPI.  -Constitutional: The patient is feeling generally well.    Physical exam:  GEN: This is a well developed, well-nourished female in no acute distress, in a pleasant mood.    SKIN: Total skin excluding the undergarment areas was performed. The exam included the head/face, neck, both arms, chest, back, abdomen, both legs, digits and/or nails.   - L posterior shoulder light pink ~4 mm macule. Dermoscopy with hair pin vessels.  - There is a waxy stuck on tan to brown papule on the L arm.  - Lower jaw line and chin with several acneiform papules and few light pink macules .  - R anterior chest ~ 4 mm slightly stellate brown macule. Dermoscopy with reticulate pigment network.  - L anterior chest and axilla with segmental light brown macule and medium brown macules within.  - Central frontal scalp mild thinning, no erythema or scale.  - No other lesions of concern on areas examined.           Impression/Plan:  1. 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 The differential diagnosis includes BCC v BLK. Discussed option of monitoring or a biopsy today. Patient elects to biopsy.    - Shave biopsy:  After discussion of benefits and risks including but not limited to bleeding/bruising, pain/swelling, infection, scar, incomplete removal, nerve damage/numbness, recurrence, and non-diagnostic biopsy, written consent, verbal consent and photographs were obtained. Time-out was performed. The area was cleaned with isopropyl alcohol. 0.5ml of 1% lidocaine with 1:100,000 epinephrine was injected to obtain adequate anesthesia. A shave biopsy was performed. Hemostasis was achieved with aluminium chloride. Vaseline and a sterile dressing were applied.  The patient tolerated the procedure and no complications were noted. The patient was provided with verbal and written post care instructions.    2. Acne vulgaris. Discussed several treatment options today including spironolactone due to association with menstruation. She is interested in this today. Would also consider Accutane for future if spironolactone does not work.  - Check potassium today.  - Start spironolactone 50 mg daily for 1 week. If tolerated, continue on 100 mg daily. This is a medication used in high doses for blood pressure but in lower doses for acne due to its anti-male hormone effects. Main side effects are dizziness, menstrual spotting, breast tenderness, high potassium, and if you were to become pregnant, a male fetus could become a female fetus. It is very important to avoid pregnancy while on this medication and to stop it immediately if you do become pregnant.     3. Lesion to monitor, R anterior chest.  She reports has been present for many years and is unchanging. Recheck at follow up.   -  No further intervention required. Patient to report changes. Will recheck at follow up in 3 months.     4. Seborrheic keratosis, non irritated. Discussed the natural history and benign nature of this lesion. Reassurance provided that no additional treatment is necessary.   - No further intervention required. Patient to report changes.     3. Non scarring alopecia.Suspect female pattern hair loss. Natural history and etiology discussed. Discussed spironolactone may help. Recheck at next visit.       Follow-up in 3 months, earlier for new or changing lesions.       Staff Involved:    Scribe Disclosure  I, Mia Monique, am serving as a scribe to document services personally performed by Dr. Veronique Mg MD, based on data collection and the provider's statements to me.     Provider Disclosure:   The documentation recorded by the scribe accurately reflects the services I personally performed and the  decisions made by me.    Veronique Mg MD    Department of Dermatology  [facility]: Phone: [phone], Fax: [phone]

## 2020-02-10 LAB — COPATH REPORT: NORMAL

## 2020-02-21 ENCOUNTER — OFFICE VISIT (OUTPATIENT)
Dept: PODIATRY | Facility: CLINIC | Age: 45
End: 2020-02-21
Payer: COMMERCIAL

## 2020-02-21 VITALS
WEIGHT: 169 LBS | BODY MASS INDEX: 28.85 KG/M2 | DIASTOLIC BLOOD PRESSURE: 88 MMHG | HEIGHT: 64 IN | SYSTOLIC BLOOD PRESSURE: 130 MMHG

## 2020-02-21 DIAGNOSIS — B35.1 ONYCHOMYCOSIS: Primary | ICD-10-CM

## 2020-02-21 PROCEDURE — 99202 OFFICE O/P NEW SF 15 MIN: CPT | Performed by: PODIATRIST

## 2020-02-21 ASSESSMENT — MIFFLIN-ST. JEOR: SCORE: 1396.58

## 2020-02-21 NOTE — LETTER
2/21/2020         RE: Mireille Walls  [address] [place] [address]        Dear Colleague,    Thank you for referring your patient, Mireille Walls, to the [facility]. Please see a copy of my visit note below.    Past Medical History:   Diagnosis Date     Abnormal Pap smear 2006?    dysplasia  X 1; paps OK since then...     Arm DVT (deep venous thromboembolism), acute (H) 2008    hx with phlebitis in IVs after a surgery     Breast disorder 2016    2D mammo, then 3D & u/s to r/o = scar tissue from reduction     Cholesterol serum elevated     runs in family     Complication of anesthesia 2000?, 2016    very slow to awake from both gen. anesth. & conscious sedati     Depression      Encounter for insertion of mirena IUD 2011    D/C'd w increased acne     Hypertension 2017&2018    jamin. during exercise, caused by stimulant for depression?     IBS (irritable bowel syndrome) 2002    under control, better with probiotic     Menarche age 15    cycles q 1-2 months Xs 3-4 d w bad cramps     Migraines 2008    a lot better now, may be stress related     OCD (obsessive compulsive disorder)      Seasonal allergies      Patient Active Problem List   Diagnosis     Depression     OCD (obsessive compulsive disorder)     IBS (irritable bowel syndrome)     Migraines     Hyperlipidemia     Contraception -- abstinence     Diaphoresis     Plantar fascial fibromatosis     Acne     Hair loss     Anxiety associated with depression     Right-sided low back pain without sciatica     ALIVIA (obstructive sleep apnea)     Essential hypertension     Past Surgical History:   Procedure Laterality Date     BIOPSY  2000    dermatology-moles     HEAD & NECK SURGERY  1990?, 2016    wisdom teeth extracted, gum tissue grafting/oral surgery     LASIK Bilateral 2008     MAMMOPLASTY REDUCTION BILATERAL  1197     Social History     Socioeconomic History     Marital status: Single     Spouse name: Not on file     Number of  children: Not on file     Years of education: Not on file     Highest education level: Not on file   Occupational History     Occupation: [employer]     Employer: [employer]   Social Needs     Financial resource strain: Not on file     Food insecurity:     Worry: Not on file     Inability: Not on file     Transportation needs:     Medical: Not on file     Non-medical: Not on file   Tobacco Use     Smoking status: Never Smoker     Smokeless tobacco: Never Used   Substance and Sexual Activity     Alcohol use: No     Drug use: No     Sexual activity: Not Currently     Partners: Male     Birth control/protection: Abstinence, Pill   Lifestyle     Physical activity:     Days per week: Not on file     Minutes per session: Not on file     Stress: Not on file   Relationships     Social connections:     Talks on phone: Not on file     Gets together: Not on file     Attends Roman Catholic service: Not on file     Active member of club or organization: Not on file     Attends meetings of clubs or organizations: Not on file     Relationship status: Not on file     Intimate partner violence:     Fear of current or ex partner: Not on file     Emotionally abused: Not on file     Physically abused: Not on file     Forced sexual activity: Not on file   Other Topics Concern     Parent/sibling w/ CABG, MI or angioplasty before 65F 55M? Not Asked      Service No     Blood Transfusions No     Caffeine Concern No     Occupational Exposure No     Hobby Hazards No     Sleep Concern Yes     Comment: sleeps 18+ hours r/t depression     Stress Concern No     Weight Concern Yes     Special Diet No     Back Care No     Exercise Yes     Bike Helmet Not Asked     Seat Belt Not Asked     Self-Exams Not Asked   Social History Narrative    How much exercise per week? Daily actvities Very littleHow much calcium per day? 600mg   How much caffeine per day? 1 can diet sodaHow much vitamin D per day? SupplementDo you/your family wear  seatbelts?  YesDo you/your family use safety helmets? YesDo you/your family use sunscreen? YesDo you/your family keep firearms in the home? NoDo you/your family have a smoke detector(s)? YesDo you feel safe in your home? YesHas anyone ever touched you in an unwanted manner? No Explain Carmen Morrissey, [facility] 01/08/2015    [facility]     Family History   Problem Relation Age of Onset     Cancer Father 57        bladder ca     Genitourinary Problems Father         Polycystic kidney     Other Cancer Father         bladder cancer     Genetic Disorder Father         polycystic kidneys     Cancer - colorectal Paternal Grandmother 75     Colon Cancer Paternal Grandmother      Mental Illness Other         SeeBelow     Cancer Mother         skin cancer     Hyperlipidemia Mother         runs in Mom's family-several     Other Cancer Mother         skin cancer     Asthma Mother         runs in Mom's family     Skin Cancer Mother      Alcohol/Drug Paternal Aunt         Xs 2     Thyroid Disease Maternal Grandmother         hypothyroid     Obesity Maternal Grandmother      Coronary Artery Disease Maternal Grandfather         heart attack     Mental Illness Other         Schizophrenia     Mental Illness Cousin         OCD     Depression Other         chronic and episodic     Mental Illness Other         ChronicMajorDepression     Anxiety Disorder Other         gen. anxiety disorder     Mental Illness Other         OCD     Substance Abuse Other         alcoholism-runs in Dad's family     Genetic Disorder Other         polycystic kidneys     Genetic Disorder Brother         [id number]     Asthma Brother      Deep Vein Thrombosis (DVT) Brother      Genetic Disorder Other         [id number]     C.A.D. No family hx of      Hypertension No family hx of      Breast Cancer No family hx of      SUBJECTIVE FINDINGS:  45-year-old female presents for toenails.  She relates she had a toenail gel put on them.  That was on for  about a year.  Last week she had it taken off and her nails were all thick and discolored underneath it.  She has been using over-the-counter antifungal for less than a week and has not done anything.  Relates it does not hurt.  No injuries.  She relates maybe she is getting some on the lesser nails as well.      OBJECTIVE FINDINGS:  DP and PT are 2/4 bilaterally.  She has right and left hallux nails and to a lesser degree the second toenails bilaterally with thickening, dystrophy, discoloration, subungual debris, discoloration and cracking.  She has minimal changing on 3-5 nails distally.      ASSESSMENT/PLAN:  Onychomycosis bilaterally.  Diagnosis and treatment options discussed with patient.  She is advised on vinegar soaks.  Prescription for Jublia given and use discussed with her.  If there are insurance coverage issues with this or she decides not to get it, we use Penlac as an alternative.  She will return to clinic and see me in 3 months.  Diagnosis and treatment options discussed with her.           Again, thank you for allowing me to participate in the care of your patient.        Sincerely,        Hayden Quiñonez DPM

## 2020-02-21 NOTE — PROGRESS NOTES
Past Medical History:   Diagnosis Date     Abnormal Pap smear 2006?    dysplasia  X 1; paps OK since then...     Arm DVT (deep venous thromboembolism), acute (H) 2008    hx with phlebitis in IVs after a surgery     Breast disorder 2016    2D mammo, then 3D & u/s to r/o = scar tissue from reduction     Cholesterol serum elevated     runs in family     Complication of anesthesia 2000?, 2016    very slow to awake from both gen. anesth. & conscious sedati     Depression      Encounter for insertion of mirena IUD 2011    D/C'd w increased acne     Hypertension 2017&2018    jamin. during exercise, caused by stimulant for depression?     IBS (irritable bowel syndrome) 2002    under control, better with probiotic     Menarche age 15    cycles q 1-2 months Xs 3-4 d w bad cramps     Migraines 2008    a lot better now, may be stress related     OCD (obsessive compulsive disorder)      Seasonal allergies      Patient Active Problem List   Diagnosis     Depression     OCD (obsessive compulsive disorder)     IBS (irritable bowel syndrome)     Migraines     Hyperlipidemia     Contraception -- abstinence     Diaphoresis     Plantar fascial fibromatosis     Acne     Hair loss     Anxiety associated with depression     Right-sided low back pain without sciatica     ALIVIA (obstructive sleep apnea)     Essential hypertension     Past Surgical History:   Procedure Laterality Date     BIOPSY  2000    dermatology-moles     HEAD & NECK SURGERY  1990?, 2016    wisdom teeth extracted, gum tissue grafting/oral surgery     LASIK Bilateral 2008     MAMMOPLASTY REDUCTION BILATERAL  1197     Social History     Socioeconomic History     Marital status: Single     Spouse name: Not on file     Number of children: Not on file     Years of education: Not on file     Highest education level: Not on file   Occupational History     Occupation: [employer] [place]     Employer: [employer]   Social Needs     Financial resource strain: Not on file     Food  insecurity:     Worry: Not on file     Inability: Not on file     Transportation needs:     Medical: Not on file     Non-medical: Not on file   Tobacco Use     Smoking status: Never Smoker     Smokeless tobacco: Never Used   Substance and Sexual Activity     Alcohol use: No     Drug use: No     Sexual activity: Not Currently     Partners: Male     Birth control/protection: Abstinence, Pill   Lifestyle     Physical activity:     Days per week: Not on file     Minutes per session: Not on file     Stress: Not on file   Relationships     Social connections:     Talks on phone: Not on file     Gets together: Not on file     Attends Shinto service: Not on file     Active member of club or organization: Not on file     Attends meetings of clubs or organizations: Not on file     Relationship status: Not on file     Intimate partner violence:     Fear of current or ex partner: Not on file     Emotionally abused: Not on file     Physically abused: Not on file     Forced sexual activity: Not on file   Other Topics Concern     Parent/sibling w/ CABG, MI or angioplasty before 65F 55M? Not Asked      Service No     Blood Transfusions No     Caffeine Concern No     Occupational Exposure No     Hobby Hazards No     Sleep Concern Yes     Comment: sleeps 18+ hours r/t depression     Stress Concern No     Weight Concern Yes     Special Diet No     Back Care No     Exercise Yes     Bike Helmet Not Asked     Seat Belt Not Asked     Self-Exams Not Asked   Social History Narrative    How much exercise per week? Daily actvities Very littleHow much calcium per day? 600mg   How much caffeine per day? 1 can diet sodaHow much vitamin D per day? SupplementDo you/your family wear seatbelts?  YesDo you/your family use safety helmets? YesDo you/your family use sunscreen? YesDo you/your family keep firearms in the home? NoDo you/your family have a smoke detector(s)? YesDo you feel safe in your home? YesHas anyone ever touched you in an  unwanted manner? No Explain Carmen Morrissey, CMA 01/08/2015    [facility]     Family History   Problem Relation Age of Onset     Cancer Father 57        bladder ca     Genitourinary Problems Father         Polycystic kidney     Other Cancer Father         bladder cancer     Genetic Disorder Father         polycystic kidneys     Cancer - colorectal Paternal Grandmother 75     Colon Cancer Paternal Grandmother      Mental Illness Other         SeeBelow     Cancer Mother         skin cancer     Hyperlipidemia Mother         runs in Mom's family-several     Other Cancer Mother         skin cancer     Asthma Mother         runs in Mom's family     Skin Cancer Mother      Alcohol/Drug Paternal Aunt         Xs 2     Thyroid Disease Maternal Grandmother         hypothyroid     Obesity Maternal Grandmother      Coronary Artery Disease Maternal Grandfather         heart attack     Mental Illness Other         Schizophrenia     Mental Illness Cousin         OCD     Depression Other         chronic and episodic     Mental Illness Other         ChronicMajorDepression     Anxiety Disorder Other         gen. anxiety disorder     Mental Illness Other         OCD     Substance Abuse Other         alcoholism-runs in Dad's family     Genetic Disorder Other         polycystic kidneys     Genetic Disorder Brother         [id number]     Asthma Brother      Deep Vein Thrombosis (DVT) Brother      Genetic Disorder Other         [id number]     C.A.D. No family hx of      Hypertension No family hx of      Breast Cancer No family hx of      SUBJECTIVE FINDINGS:  45-year-old female presents for toenails.  She relates she had a toenail gel put on them.  That was on for about a year.  Last week she had it taken off and her nails were all thick and discolored underneath it.  She has been using over-the-counter antifungal for less than a week and has not done anything.  Relates it does not hurt.  No injuries.  She relates  maybe she is getting some on the lesser nails as well.      OBJECTIVE FINDINGS:  DP and PT are 2/4 bilaterally.  She has right and left hallux nails and to a lesser degree the second toenails bilaterally with thickening, dystrophy, discoloration, subungual debris, discoloration and cracking.  She has minimal changing on 3-5 nails distally.      ASSESSMENT/PLAN:  Onychomycosis bilaterally.  Diagnosis and treatment options discussed with patient.  She is advised on vinegar soaks.  Prescription for Jublia given and use discussed with her.  If there are insurance coverage issues with this or she decides not to get it, we use Penlac as an alternative.  She will return to clinic and see me in 3 months.  Diagnosis and treatment options discussed with her.

## 2020-02-21 NOTE — NURSING NOTE
"Mireille Walls's chief complaint for this visit includes:  Chief Complaint   Patient presents with     Consult     bilateral great toe nail yellow and thickening      PCP: No Ref-Primary, Physician    Referring Provider:  No referring provider defined for this encounter.    /88   Ht 1.626 m (5' 4\")   Wt 76.7 kg (169 lb)   BMI 29.01 kg/m        Do you need any medication refills at today's visit?no       "

## 2020-03-17 ENCOUNTER — TELEPHONE (OUTPATIENT)
Dept: PODIATRY | Facility: CLINIC | Age: 45
End: 2020-03-17

## 2020-03-17 NOTE — TELEPHONE ENCOUNTER
[facility] Call Center    Phone Message    May a detailed message be left on voicemail: yes     Reason for Call: Other: patient is calling regarding possible toenail infection/fungus and would like to get care team advice on if pt should schedule to pt seen. please advise     Action Taken: Message routed to:  Adult Clinics: Podiatry p [phone]

## 2020-03-18 DIAGNOSIS — L03.039 PARONYCHIA, TOE, UNSPECIFIED LATERALITY: Primary | ICD-10-CM

## 2020-03-18 RX ORDER — CEPHALEXIN 500 MG/1
500 CAPSULE ORAL 2 TIMES DAILY
Qty: 28 CAPSULE | Refills: 0 | Status: SHIPPED | OUTPATIENT
Start: 2020-03-18 | End: 2020-04-01

## 2020-03-18 NOTE — TELEPHONE ENCOUNTER
Message from Dr. Quiñonez Start foot soaks, prescription for Keflex sent to pharmacy, can continue Jublia, may need to stop if Paronychia does not resolve with Keflex and foot soaks.  Explained this to patient, she had no questions. She will get the antibiotic and start vineger foot soaks.

## 2020-03-18 NOTE — TELEPHONE ENCOUNTER
Called and talked with patient. She relates that for a couple of days she has noticed her right foot 1st hallux is  [place] red around the edges. She is using Jublia every night but no vinager soaks. No drainage, swelling or pain. Did run over it with a patient cart over the weekend, she is able to bend and move it without pain and does not think it is fractured.  Will send message to Dr. Quiñonez.  If a prescription is needed send to [facility] pharmacy.    Jaky Ivan RN

## 2020-03-21 DIAGNOSIS — L70.0 ACNE VULGARIS: ICD-10-CM

## 2020-03-23 RX ORDER — SPIRONOLACTONE 50 MG/1
TABLET, FILM COATED ORAL
Qty: 60 TABLET | Refills: 0 | Status: SHIPPED | OUTPATIENT
Start: 2020-03-23 | End: 2020-04-23

## 2020-03-23 NOTE — TELEPHONE ENCOUNTER
Thank you - Josephine, do we want to call and consider rescheduling her appt even? Okay to refill until she can be seen if doing well. We can also wait until closer to date of appt as well.

## 2020-03-23 NOTE — TELEPHONE ENCOUNTER
Received refill request for spironolactone as the resident on call. Reviewed patient's chart and attached communication. Patient last seen 1/30/20 for acne vulgaris. Called patient to see if tolerating 100 mg daily dose, no response. Given COVID 19 pandemic, 1 month refill of spironolactone 50 mg (60 tabs) provided. Patient's information will be forwarded to attending Dr. Mg.

## 2020-03-23 NOTE — TELEPHONE ENCOUNTER
spironolactone (ALDACTONE) 25 MG     Last Written Prescription Date:  1/30/20   Last Fill Quantity: 60,   # refills: 3  Last Office Visit : 1/30/20  Future Office visit:  4/23/20    Routing refill request to provider for review/approval because:  Request is for 50 MG tabs (fewer tabs)   Do you want to discontinue 25 MG TABS  IN LAST RF : 50 mg once daily, increase to 100 mg daily in 1 week if tolerated

## 2020-04-21 ENCOUNTER — TELEPHONE (OUTPATIENT)
Dept: DERMATOLOGY | Facility: CLINIC | Age: 45
End: 2020-04-21

## 2020-04-21 NOTE — TELEPHONE ENCOUNTER
LVM for patient about changing appointment to either a video visit or a telephone visit since we are decreasing in-person visits due to the coronavirus pandemic. I also let them know that they need to send in photos via [employer] prior to the visit.     I left the clinic number and also sent a [employer] message.     Vale Munguia, EMT

## 2020-04-22 ENCOUNTER — TELEPHONE (OUTPATIENT)
Dept: DERMATOLOGY | Facility: CLINIC | Age: 45
End: 2020-04-22

## 2020-04-22 NOTE — TELEPHONE ENCOUNTER
Called patient, who stated that they are aware that their appointment was changed to a telephone visit. They felt as though sending in photos was unnecessary, but I let them know that it was an essential part of the visit in order to document improvement. I explained how to upload the photos, and Mireille consented to sending them in sometime today.    Vale Munguia, EMT

## 2020-04-23 ENCOUNTER — VIRTUAL VISIT (OUTPATIENT)
Dept: DERMATOLOGY | Facility: CLINIC | Age: 45
End: 2020-04-23
Payer: COMMERCIAL

## 2020-04-23 DIAGNOSIS — L70.0 ACNE VULGARIS: Primary | ICD-10-CM

## 2020-04-23 DIAGNOSIS — Z51.81 MEDICATION MONITORING ENCOUNTER: ICD-10-CM

## 2020-04-23 RX ORDER — SPIRONOLACTONE 50 MG/1
TABLET, FILM COATED ORAL
Qty: 120 TABLET | Refills: 3 | Status: SHIPPED | OUTPATIENT
Start: 2020-04-23 | End: 2020-10-06

## 2020-04-23 NOTE — PROGRESS NOTES
TIM [facility] Record:  Store and Forward and Telephone:  [phone]      Impression and Recommendations (Patient Counseled on the Following):  1. Acne vulgaris, hormonal. unfortunately not any improvement on spironolactone 100 mg daily. discussed options of increase dose versus adding isotretinoin. She will try increasing dose first. Consider isotretinoin for future if spironolactone does not work.  - Increase spironolactone to 150 mg daily for 1 week and recheck potassium. If tolerated, can increase to 200 mg daily.  - Consider isotretinoin in future. Discussed at length today.     2. Lesion to monitor, R anterior chest.  She reports has been present for many years and is unchanging. She sent photo today and reports no recent changes. Recheck at next visit.    3. Non-scarring hair loss, favored female pattern hair loss. No improvement on spironolactone yet. She is not interested in Rogaine. Advised may notice increase growth on higher dose and with longer course. Monitor.    Follow-up:   Follow-up with dermatology in approximately 3 months. Earlier for new or changing lesions or rash. She will let us know sooner if interested in isotretinoin.     Staff only:    Veronique Mg MD    Department of Dermatology  [facility]: Phone: [phone], Fax: [phone]  4/23/2020    _____________________________________________________________________________    Dermatology Problem List:  1. Seborrheic dermatitis: Ketoconazole shampoo 3 times weekly  2. Nummular dermatitis of the central forehead with a negative KOH scraping on July 11, 2019: Hydrocortisone 2.5% cream twice daily for 2 to 3 weeks  3. Acne vulgaris, hormonal.  - Spironolactone 100 mg every day, started 1/30/20, increased to 150-200 mg 4/23/2020   - Consider isotretinoin   4. Benign bx:  - Psoriasiform and lichenoid dermatitis, L posterior shoulder, s/p  shave bx 1/30/2020 - given solitary lesion, favor BLK    Encounter Date: Apr 23, 2020    CC:   Chief Complaint   Patient presents with     Derm Problem     cystic acne f/u - Mireille states that it has not cleared up      Derm Problem     mole check - sent in photo to be checked and states that the spot has not changed since the last visit       History of Present Illness:  I have reviewed the teledermatology information and the nursing intake corresponding to this issue. Mireille Walls is a 45 year old female who presents via teledermatology for follow-up acne.    Last evaluated 1/30/2020 at which time we started spironolactone for acne.  Since last visit, unfortunately, acne has not changed at all.  Taking 100 mg daily spironolactone - no side effects - no dizziness, no spotting, no breast tenderness.  No changes noted to mole on chest.  Her biopsy site from prior has healed.  No changes to hair growth.  Wondering if can increase dose of spironolactone or if needs another treatment. She is not sexually active.    ROS: Patient is generally feeling well today.    Physical Examination:  General: mood pleasant.  Skin: Focused examination within the teledermatology photograph(s) including jawline, mole on chest, and scalp was performed.   -Jawline with several light pink inflamed papulonodules occ excoriated.  -Thinning of hair along vertex.  -Irregular light/medium brown macule on chest, out of focus.              Labs:  K normal on 1/30/2020.    Past Medical History:   Patient Active Problem List   Diagnosis     Depression     OCD (obsessive compulsive disorder)     IBS (irritable bowel syndrome)     Migraines     Hyperlipidemia     Contraception -- abstinence     Diaphoresis     Plantar fascial fibromatosis     Acne     Hair loss     Anxiety associated with depression     Right-sided low back pain without sciatica     ALIVIA (obstructive sleep apnea)     Essential hypertension     Past Medical History:   Diagnosis Date      Abnormal Pap smear 2006?    dysplasia  X 1; paps OK since then...     Arm DVT (deep venous thromboembolism), acute (H) 2008    hx with phlebitis in IVs after a surgery     Breast disorder 2016    2D mammo, then 3D & u/s to r/o = scar tissue from reduction     Cholesterol serum elevated     runs in family     Complication of anesthesia 2000?, 2016    very slow to awake from both gen. anesth. & conscious sedati     Depression      Encounter for insertion of mirena IUD 2011    D/C'd w increased acne     Hypertension 2017&2018    jamin. during exercise, caused by stimulant for depression?     IBS (irritable bowel syndrome) 2002    under control, better with probiotic     Menarche age 15    cycles q 1-2 months Xs 3-4 d w bad cramps     Migraines 2008    a lot better now, may be stress related     OCD (obsessive compulsive disorder)      Seasonal allergies      Past Surgical History:   Procedure Laterality Date     BIOPSY  2000    dermatology-moles     HEAD & NECK SURGERY  1990?, 2016    wisdom teeth extracted, gum tissue grafting/oral surgery     LASIK Bilateral 2008     MAMMOPLASTY REDUCTION BILATERAL  1197       Social History:  Patient reports that she has never smoked. She has never used smokeless tobacco. She reports that she does not drink alcohol or use drugs.    Family History:  Family History   Problem Relation Age of Onset     Cancer Father 57        bladder ca     Genitourinary Problems Father         Polycystic kidney     Other Cancer Father         bladder cancer     Genetic Disorder Father         polycystic kidneys     Cancer - colorectal Paternal Grandmother 75     Colon Cancer Paternal Grandmother      Mental Illness Other         SeeBelow     Cancer Mother         skin cancer     Hyperlipidemia Mother         runs in Mom's family-several     Other Cancer Mother         skin cancer     Asthma Mother         runs in Mom's family     Skin Cancer Mother      Alcohol/Drug Paternal Aunt         Xs 2     Thyroid  Disease Maternal Grandmother         hypothyroid     Obesity Maternal Grandmother      Coronary Artery Disease Maternal Grandfather         heart attack     Mental Illness Other         Schizophrenia     Mental Illness Cousin         OCD     Depression Other         chronic and episodic     Mental Illness Other         ChronicMajorDepression     Anxiety Disorder Other         gen. anxiety disorder     Mental Illness Other         OCD     Substance Abuse Other         alcoholism-runs in Dad's family     Genetic Disorder Other         polycystic kidneys     Genetic Disorder Brother         [id number]     Asthma Brother      Deep Vein Thrombosis (DVT) Brother      Genetic Disorder Other         [id number]     C.A.D. No family hx of      Hypertension No family hx of      Breast Cancer No family hx of        Medications:  Current Outpatient Medications   Medication     buPROPion (WELLBUTRIN XL) 150 MG 24 hr tablet     CALCIUM PO     Cholecalciferol (D3 VITAMIN PO)     COD LIVER OIL PO     Efinaconazole 10 % EX SOLN     hydrocortisone 2.5 % cream     loratadine (CLARITIN) 10 MG tablet     Multiple Vitamins-Minerals (MULTIPLE VITAMINS/WOMENS PO)     Probiotic Product (PROBIOTIC PO)     rizatriptan (MAXALT-MLT) 5 MG ODT     spironolactone (ALDACTONE) 50 MG tablet     vortioxetine (TRINTELLIX) 10 MG tablet     No current facility-administered medications for this visit.           Allergies   Allergen Reactions     Phenergan Vc [Promethazine Vc] Visual Disturbance         _____________________________________________________________________________    Teledermatology information:  - Location of patient: Home  - Patient presented as: return  - Location of teledermatologist:  ([facility] DERMATOLOGY )  - Reason teledermatology is appropriate:  of National Emergency Regarding Coronavirus disease (COVID 19) Outbreak  - Image quality and interpretability: acceptable  - Physician has received verbal consent for a Video/Photos  Visit from the patient? Yes  - In-person dermatology visit recommendation: no  - Date of images: 4/22/2020  - Service start time: 11:56 AM   - Service end time: 12:09 PM   - Date of report: 4/23/2020

## 2020-04-23 NOTE — PROGRESS NOTES
"Teledermatology Nurse Call for RETURN patients seen within the last 3 years:    The patient was contacted by phone and we reviewed, \"Due to the coronavirus pandemic, we are calling to review your visit and offer you a teledermatology visit where you send in photos via [employer]. These photos will be seen by an MD or FOUZIA. This will be billed to you and your insurance.\"  The patient was also told that \"a teledermatology visit is not as thorough as an in-person visit and that the quality of the photograph sent may not be of the same quality as that taken by the dermatology clinic, but the patient would like to proceed with an teledermatology because of National Emergency Regarding Coronavirus disease (COVID 19) Outbreak.\"  \"If a prescription is necessary we can send it directly to your pharmacy.  If lab work is needed we can place an order for that and you can then stop by our lab to have the test done at a later time.\"    The patient understood that they may receive a call from the clinic to review additional history, may still be instructed to come to clinic even after photo review and be billed for both visits with MD Katie. They were told that a photo assessment does not replace an in person skin exam. The patient understood that teledermatology is not for urgent issues and would require up to 3 business days for review. The patient denied skin pain, fever, mucosal symptoms (lesions, blisters, sores in the mouth, nose, eyes, or genitals)  IF PATIENT ENDORSES ANY OF THESE STOP AND PAGE  ON CALL ATTENDING. IF OTHER POSSIBLY URGENT SYMPTOMS THEN PAGE PHYSICIAN YOU ARE SCHEDULING WITH OR ON CALL IF NO ANSWER.       The patient chose to:                                                                                                                                                                                                                    Consent to a teledermatology visit with [employer] photos. The patient " understood they must upload a mychart photo for this visit to be completed. They indicated that the photo will be taken at their home address(if other address please document here). Patient told nursing these are already uploaded .  The patient was instructed to take photos of all all areas of concern and all areas of any rash from near and far away.                                                                                                                                                                                                                                                                                                                                                                                                                                      Patient concerns for this return visit: cystic acne and a mole check     Nursing tasks completed  -Pharmacy preference was updated.  -The nurse has dropped in the AVS information *(For adults the phrase is umdermhteleavs and for pediatrics it is their own) for the physician to route in the AVS.                                                                                                                                                                                                                         -The patient was told to contact the clinic if they have not received correspondence within 72 hours.

## 2020-04-23 NOTE — NURSING NOTE
Dermatology Rooming Note    Mireille Walls's goals for this visit include:   Chief Complaint   Patient presents with     Derm Problem     cystic acne f/u - Mireille states that it has not cleared up      Derm Problem     mole check - sent in photo to be checked and states that the spot has not changed since the last visit     Vale Munguia, EMT

## 2020-04-23 NOTE — PATIENT INSTRUCTIONS
[facility] Teledermatology Visit    Thank you for allowing us to participate in your care. Your findings, instructions and follow-up plan are as follows:    Hi Ms. Walls,  Nice talking with you today. I'm sorry the spironolactone hasn't helped your acne yet but hopefully a higher dose will do the trick.  Please increase to 150 mg daily and recheck potassium in 1 week.  If potassium okay and you're feeling okay, we can increase to 200 mg daily.  If ever interested in isotretinoin, please let me know. I also put a handout below with some additional information. The monitoring system is called ipledge if you want to look into this.    Return in 3 months, sooner if concerns.          When should I call my doctor?    If you are worsening or not improving, please, contact us or seek urgent care as noted below.     Who should I call with questions (adults)?    [facility] (adult and pediatric): [phone]     [facility] (adult): [phone]    For urgent needs outside of business hours call the [facility] at [phone] and ask for the dermatology resident on call    If this is a medical emergency and you are unable to reach an ER, Call [phone]      Who should I call with questions (pediatric)?  [facility]- Pediatric Dermatology  Dr. Rajni Man, Dr. Nicola Magallanes, Dr. Asmita Vail, Lena Diaz, PA  Dr. Michelle Yen, Dr. Rasheeda Bernard & Dr. Rory Maradiaga  Non Urgent  Nurse Triage Line; [phone]- Jami and Danelle LAZARO Care Coordinators   Meg (/Complex ) [phone]    If you need a prescription refill, please contact your pharmacy. Refills are approved or denied by our Physicians during normal business hours, Monday through Fridays  Per office policy, refills will not be granted if you have not been seen within the past year (or sooner depending on your child's  condition)    Scheduling Information:  Pediatric Appointment Scheduling and Call Center [phone]  Radiology Scheduling- [phone]  Sedation Unit Scheduling- [phone]  [place] Scheduling- General [phone]; Pediatric Dermatology [phone]  Main  Services: [phone]  Cymraes: [phone]  Liberian: [phone]  Hmong/Guatemalan/Faroese: [phone]  Preadmission Nursing Department Fax Number: [phone] (Fax all pre-operative paperwork to this number)    For urgent matters arising during evenings, weekends, or holidays that cannot wait for normal business hours please call [phone] and ask for the Dermatology Resident On-Call to be paged.

## 2020-05-08 DIAGNOSIS — L70.0 ACNE VULGARIS: ICD-10-CM

## 2020-05-08 DIAGNOSIS — Z51.81 MEDICATION MONITORING ENCOUNTER: ICD-10-CM

## 2020-05-08 LAB — POTASSIUM SERPL-SCNC: 4.5 MMOL/L (ref 3.4–5.3)

## 2020-05-08 PROCEDURE — 36415 COLL VENOUS BLD VENIPUNCTURE: CPT | Performed by: DERMATOLOGY

## 2020-05-08 PROCEDURE — 84132 ASSAY OF SERUM POTASSIUM: CPT | Performed by: DERMATOLOGY

## 2020-06-03 DIAGNOSIS — L21.9 DERMATITIS, SEBORRHEIC: ICD-10-CM

## 2020-06-03 RX ORDER — KETOCONAZOLE 20 MG/ML
SHAMPOO TOPICAL
Qty: 120 ML | Refills: 11 | Status: SHIPPED | OUTPATIENT
Start: 2020-06-03

## 2020-06-03 NOTE — TELEPHONE ENCOUNTER
Patient last seen by Dr. Mg in dermatology on 4/23/20. Per rae Lora to provide refill of medication with 11 additional refills.

## 2020-07-10 ENCOUNTER — OFFICE VISIT (OUTPATIENT)
Dept: PODIATRY | Facility: CLINIC | Age: 45
End: 2020-07-10
Payer: COMMERCIAL

## 2020-07-10 DIAGNOSIS — B35.1 ONYCHOMYCOSIS: Primary | ICD-10-CM

## 2020-07-10 PROCEDURE — 99212 OFFICE O/P EST SF 10 MIN: CPT | Performed by: PODIATRIST

## 2020-07-10 NOTE — LETTER
7/10/2020         RE: Mireille Walls  [address] [place] [address]        Dear Colleague,    Thank you for referring your patient, Mireille Walls, to the [facility]. Please see a copy of my visit note below.    Past Medical History:   Diagnosis Date     Abnormal Pap smear 2006?    dysplasia  X 1; paps OK since then...     Arm DVT (deep venous thromboembolism), acute (H) 2008    hx with phlebitis in IVs after a surgery     Breast disorder 2016    2D mammo, then 3D & u/s to r/o = scar tissue from reduction     Cholesterol serum elevated     runs in family     Complication of anesthesia 2000?, 2016    very slow to awake from both gen. anesth. & conscious sedati     Depression      Encounter for insertion of mirena IUD 2011    D/C'd w increased acne     Hypertension 2017&2018    jamin. during exercise, caused by stimulant for depression?     IBS (irritable bowel syndrome) 2002    under control, better with probiotic     Menarche age 15    cycles q 1-2 months Xs 3-4 d w bad cramps     Migraines 2008    a lot better now, may be stress related     OCD (obsessive compulsive disorder)      Seasonal allergies      Patient Active Problem List   Diagnosis     Depression     OCD (obsessive compulsive disorder)     IBS (irritable bowel syndrome)     Migraines     Hyperlipidemia     Contraception -- abstinence     Diaphoresis     Plantar fascial fibromatosis     Acne     Hair loss     Anxiety associated with depression     Right-sided low back pain without sciatica     ALIVIA (obstructive sleep apnea)     Essential hypertension     Past Surgical History:   Procedure Laterality Date     BIOPSY  2000    dermatology-moles     HEAD & NECK SURGERY  1990?, 2016    wisdom teeth extracted, gum tissue grafting/oral surgery     LASIK Bilateral 2008     MAMMOPLASTY REDUCTION BILATERAL  1197     Social History     Socioeconomic History     Marital status: Single     Spouse name: Not on file     Number of  children: Not on file     Years of education: Not on file     Highest education level: Not on file   Occupational History     Occupation: [employer]     Employer: [employer]   Social Needs     Financial resource strain: Not on file     Food insecurity     Worry: Not on file     Inability: Not on file     Transportation needs     Medical: Not on file     Non-medical: Not on file   Tobacco Use     Smoking status: Never Smoker     Smokeless tobacco: Never Used   Substance and Sexual Activity     Alcohol use: No     Drug use: No     Sexual activity: Not Currently     Partners: Male     Birth control/protection: Abstinence, Pill   Lifestyle     Physical activity     Days per week: Not on file     Minutes per session: Not on file     Stress: Not on file   Relationships     Social connections     Talks on phone: Not on file     Gets together: Not on file     Attends Mu-ism service: Not on file     Active member of club or organization: Not on file     Attends meetings of clubs or organizations: Not on file     Relationship status: Not on file     Intimate partner violence     Fear of current or ex partner: Not on file     Emotionally abused: Not on file     Physically abused: Not on file     Forced sexual activity: Not on file   Other Topics Concern     Parent/sibling w/ CABG, MI or angioplasty before 65F 55M? Not Asked      Service No     Blood Transfusions No     Caffeine Concern No     Occupational Exposure No     Hobby Hazards No     Sleep Concern Yes     Comment: sleeps 18+ hours r/t depression     Stress Concern No     Weight Concern Yes     Special Diet No     Back Care No     Exercise Yes     Bike Helmet Not Asked     Seat Belt Not Asked     Self-Exams Not Asked   Social History Narrative    How much exercise per week? Daily actvities Very littleHow much calcium per day? 600mg   How much caffeine per day? 1 can diet sodaHow much vitamin D per day? SupplementDo you/your family wear seatbelts?   YesDo you/your family use safety helmets? YesDo you/your family use sunscreen? YesDo you/your family keep firearms in the home? NoDo you/your family have a smoke detector(s)? YesDo you feel safe in your home? YesHas anyone ever touched you in an unwanted manner? No Explain Carmen Morrissey, [facility] 01/08/2015    Reviewed [facility] 6-     Family History   Problem Relation Age of Onset     Cancer Father 57        bladder ca     Genitourinary Problems Father         Polycystic kidney     Other Cancer Father         bladder cancer     Genetic Disorder Father         polycystic kidneys     Cancer - colorectal Paternal Grandmother 75     Colon Cancer Paternal Grandmother      Mental Illness Other         SeeBelow     Cancer Mother         skin cancer     Hyperlipidemia Mother         runs in Mom's family-several     Other Cancer Mother         skin cancer     Asthma Mother         runs in Mom's family     Skin Cancer Mother      Alcohol/Drug Paternal Aunt         Xs 2     Thyroid Disease Maternal Grandmother         hypothyroid     Obesity Maternal Grandmother      Coronary Artery Disease Maternal Grandfather         heart attack     Mental Illness Other         Schizophrenia     Mental Illness Cousin         OCD     Depression Other         chronic and episodic     Mental Illness Other         ChronicMajorDepression     Anxiety Disorder Other         gen. anxiety disorder     Mental Illness Other         OCD     Substance Abuse Other         alcoholism-runs in Dad's family     Genetic Disorder Other         polycystic kidneys     Genetic Disorder Brother         [id number]     Asthma Brother      Deep Vein Thrombosis (DVT) Brother      Genetic Disorder Other         [id number]     C.A.D. No family hx of      Hypertension No family hx of      Breast Cancer No family hx of      SUBJECTIVE FINDINGS:  A 45-year-old female returns to clinic for onychomycosis bilaterally.  She relates it is doing well.  She relates her  right hallux nail fell off.  She relates the left first and second toenails are still thick.  She relates no other specific relieving or aggravating factors.  She has been using the Jublia.  She relates no injuries.      OBJECTIVE FINDINGS:  Vascular status intact bilaterally.  She has a right hallux nail that has come off; the proximal nail is growing in clear.  She has a left hallux nail that is thick, dystrophic, brittle with loosening and subungual debris and dystrophy.  Proximal nail is growing in clear.  Left second toenail still has thickened, dystrophic, discolored, incurvated nail.      ASSESSMENT AND PLAN:  Onychomycosis bilaterally.  Some improvement seen.  Diagnosis and treatment options discussed with the patient.  Continue the vinegar soaks and the Jublia.  Left hallux nail was debrided upon consent.  Her second toenail was debrided upon consent on the left.  Return to clinic and see me in about 4 months.           Again, thank you for allowing me to participate in the care of your patient.        Sincerely,        Hayden Quiñonez DPM

## 2020-07-10 NOTE — PATIENT INSTRUCTIONS
Thanks for coming today.  Ortho/Sports Medicine Clinic  [address] [place] [address]    To schedule future appointments in Ortho Clinic, you may call [phone].    To schedule ordered imaging by your provider:   Call Central Imaging [phone]    To schedule an injection ordered by your provider:  Call Central Imaging Injection scheduling line: [phone]  [facility] Foundationhart available online at:  [email or web address]    Please call if any further questions or concerns ([phone]).  Clinic hours 8 am to 5 pm.    Return to clinic (call) if symptoms worsen or fail to improve.

## 2020-07-10 NOTE — PROGRESS NOTES
Past Medical History:   Diagnosis Date     Abnormal Pap smear 2006?    dysplasia  X 1; paps OK since then...     Arm DVT (deep venous thromboembolism), acute (H) 2008    hx with phlebitis in IVs after a surgery     Breast disorder 2016    2D mammo, then 3D & u/s to r/o = scar tissue from reduction     Cholesterol serum elevated     runs in family     Complication of anesthesia 2000?, 2016    very slow to awake from both gen. anesth. & conscious sedati     Depression      Encounter for insertion of mirena IUD 2011    D/C'd w increased acne     Hypertension 2017&2018    jamin. during exercise, caused by stimulant for depression?     IBS (irritable bowel syndrome) 2002    under control, better with probiotic     Menarche age 15    cycles q 1-2 months Xs 3-4 d w bad cramps     Migraines 2008    a lot better now, may be stress related     OCD (obsessive compulsive disorder)      Seasonal allergies      Patient Active Problem List   Diagnosis     Depression     OCD (obsessive compulsive disorder)     IBS (irritable bowel syndrome)     Migraines     Hyperlipidemia     Contraception -- abstinence     Diaphoresis     Plantar fascial fibromatosis     Acne     Hair loss     Anxiety associated with depression     Right-sided low back pain without sciatica     ALIVIA (obstructive sleep apnea)     Essential hypertension     Past Surgical History:   Procedure Laterality Date     BIOPSY  2000    dermatology-moles     HEAD & NECK SURGERY  1990?, 2016    wisdom teeth extracted, gum tissue grafting/oral surgery     LASIK Bilateral 2008     MAMMOPLASTY REDUCTION BILATERAL  1197     Social History     Socioeconomic History     Marital status: Single     Spouse name: Not on file     Number of children: Not on file     Years of education: Not on file     Highest education level: Not on file   Occupational History     Occupation: [employer]     Employer: [employer]   Social Needs     Financial resource strain: Not on file     Food  insecurity     Worry: Not on file     Inability: Not on file     Transportation needs     Medical: Not on file     Non-medical: Not on file   Tobacco Use     Smoking status: Never Smoker     Smokeless tobacco: Never Used   Substance and Sexual Activity     Alcohol use: No     Drug use: No     Sexual activity: Not Currently     Partners: Male     Birth control/protection: Abstinence, Pill   Lifestyle     Physical activity     Days per week: Not on file     Minutes per session: Not on file     Stress: Not on file   Relationships     Social connections     Talks on phone: Not on file     Gets together: Not on file     Attends Orthodox service: Not on file     Active member of club or organization: Not on file     Attends meetings of clubs or organizations: Not on file     Relationship status: Not on file     Intimate partner violence     Fear of current or ex partner: Not on file     Emotionally abused: Not on file     Physically abused: Not on file     Forced sexual activity: Not on file   Other Topics Concern     Parent/sibling w/ CABG, MI or angioplasty before 65F 55M? Not Asked      Service No     Blood Transfusions No     Caffeine Concern No     Occupational Exposure No     Hobby Hazards No     Sleep Concern Yes     Comment: sleeps 18+ hours r/t depression     Stress Concern No     Weight Concern Yes     Special Diet No     Back Care No     Exercise Yes     Bike Helmet Not Asked     Seat Belt Not Asked     Self-Exams Not Asked   Social History Narrative    How much exercise per week? Daily actvities Very littleHow much calcium per day? 600mg   How much caffeine per day? 1 can diet sodaHow much vitamin D per day? SupplementDo you/your family wear seatbelts?  YesDo you/your family use safety helmets? YesDo you/your family use sunscreen? YesDo you/your family keep firearms in the home? NoDo you/your family have a smoke detector(s)? YesDo you feel safe in your home? YesHas anyone ever touched you in an  unwanted manner? No Explain Carmen Morrissey, CMA 01/08/2015    [facility]     Family History   Problem Relation Age of Onset     Cancer Father 57        bladder ca     Genitourinary Problems Father         Polycystic kidney     Other Cancer Father         bladder cancer     Genetic Disorder Father         polycystic kidneys     Cancer - colorectal Paternal Grandmother 75     Colon Cancer Paternal Grandmother      Mental Illness Other         SeeBelow     Cancer Mother         skin cancer     Hyperlipidemia Mother         runs in Mom's family-several     Other Cancer Mother         skin cancer     Asthma Mother         runs in Mom's family     Skin Cancer Mother      Alcohol/Drug Paternal Aunt         Xs 2     Thyroid Disease Maternal Grandmother         hypothyroid     Obesity Maternal Grandmother      Coronary Artery Disease Maternal Grandfather         heart attack     Mental Illness Other         Schizophrenia     Mental Illness Cousin         OCD     Depression Other         chronic and episodic     Mental Illness Other         ChronicMajorDepression     Anxiety Disorder Other         gen. anxiety disorder     Mental Illness Other         OCD     Substance Abuse Other         alcoholism-runs in Dad's family     Genetic Disorder Other         polycystic kidneys     Genetic Disorder Brother         [id number]     Asthma Brother      Deep Vein Thrombosis (DVT) Brother      Genetic Disorder Other         [id number]     C.A.D. No family hx of      Hypertension No family hx of      Breast Cancer No family hx of      SUBJECTIVE FINDINGS:  A 45-year-old female returns to clinic for onychomycosis bilaterally.  She relates it is doing well.  She relates her right hallux nail fell off.  She relates the left first and second toenails are still thick.  She relates no other specific relieving or aggravating factors.  She has been using the Jublia.  She relates no injuries.      OBJECTIVE FINDINGS:   Vascular status intact bilaterally.  She has a right hallux nail that has come off; the proximal nail is growing in clear.  She has a left hallux nail that is thick, dystrophic, brittle with loosening and subungual debris and dystrophy.  Proximal nail is growing in clear.  Left second toenail still has thickened, dystrophic, discolored, incurvated nail.      ASSESSMENT AND PLAN:  Onychomycosis bilaterally.  Some improvement seen.  Diagnosis and treatment options discussed with the patient.  Continue the vinegar soaks and the Jublia.  Left hallux nail was debrided upon consent.  Her second toenail was debrided upon consent on the left.  Return to clinic and see me in about 4 months.

## 2020-07-10 NOTE — NURSING NOTE
Mireille Walls's chief complaint for this visit includes:  Chief Complaint   Patient presents with     RECHECK     3 month toenail follow up     PCP: No Ref-Primary, Physician    Referring Provider:  No referring provider defined for this encounter.    There were no vitals taken for this visit.  Data Unavailable     Do you need any medication refills at today's visit? no

## 2020-07-14 ENCOUNTER — TELEPHONE (OUTPATIENT)
Dept: DERMATOLOGY | Facility: CLINIC | Age: 45
End: 2020-07-14

## 2020-07-14 NOTE — TELEPHONE ENCOUNTER
BARBARA Stating that I was calling due to covid 19 we are still limiting in clinic visits, she had her last visit as a virtual and I will be switching her visit to a telephone visit with her sending in photos. I will also reach out viz [facility] with this information. Clinic number provided for concerns.    Valencia BOWIE CMA

## 2020-07-17 DIAGNOSIS — Z51.81 MEDICATION MONITORING ENCOUNTER: Primary | ICD-10-CM

## 2020-07-28 DIAGNOSIS — Z51.81 MEDICATION MONITORING ENCOUNTER: ICD-10-CM

## 2020-07-28 LAB — POTASSIUM SERPL-SCNC: 4 MMOL/L (ref 3.4–5.3)

## 2020-07-28 PROCEDURE — 36415 COLL VENOUS BLD VENIPUNCTURE: CPT | Performed by: DERMATOLOGY

## 2020-07-28 PROCEDURE — 84132 ASSAY OF SERUM POTASSIUM: CPT | Performed by: DERMATOLOGY

## 2020-09-03 ENCOUNTER — ANCILLARY PROCEDURE (OUTPATIENT)
Dept: MAMMOGRAPHY | Facility: CLINIC | Age: 45
End: 2020-09-03
Payer: COMMERCIAL

## 2020-09-03 DIAGNOSIS — Z12.31 VISIT FOR SCREENING MAMMOGRAM: ICD-10-CM

## 2020-09-03 PROCEDURE — 77067 SCR MAMMO BI INCL CAD: CPT | Performed by: STUDENT IN AN ORGANIZED HEALTH CARE EDUCATION/TRAINING PROGRAM

## 2020-09-03 PROCEDURE — 77063 BREAST TOMOSYNTHESIS BI: CPT | Performed by: STUDENT IN AN ORGANIZED HEALTH CARE EDUCATION/TRAINING PROGRAM

## 2020-10-06 DIAGNOSIS — L70.0 ACNE VULGARIS: ICD-10-CM

## 2020-10-06 RX ORDER — SPIRONOLACTONE 50 MG/1
TABLET, FILM COATED ORAL
Qty: 120 TABLET | Refills: 3 | Status: SHIPPED | OUTPATIENT
Start: 2020-10-06 | End: 2021-03-22

## 2020-10-06 NOTE — TELEPHONE ENCOUNTER
Can you double check with patient on her dose? Is she still taking 100 mg in the morning and 50 mg in the evening or did she increase to 100 mg twice daily? If she increased to 100 mg twice daily, we should check her potassium again and maybe also just check her kidney function since we haven't done that in a while. If she is still at the lower dose, no need to get labs rechecked. In meantime, I refilled her med. Thanks!

## 2020-11-07 ENCOUNTER — HEALTH MAINTENANCE LETTER (OUTPATIENT)
Age: 45
End: 2020-11-07

## 2020-11-13 ENCOUNTER — OFFICE VISIT (OUTPATIENT)
Dept: PODIATRY | Facility: CLINIC | Age: 45
End: 2020-11-13
Payer: COMMERCIAL

## 2020-11-13 VITALS — HEART RATE: 68 BPM | DIASTOLIC BLOOD PRESSURE: 66 MMHG | SYSTOLIC BLOOD PRESSURE: 109 MMHG

## 2020-11-13 DIAGNOSIS — B35.1 ONYCHOMYCOSIS: Primary | ICD-10-CM

## 2020-11-13 PROCEDURE — 99213 OFFICE O/P EST LOW 20 MIN: CPT | Performed by: PODIATRIST

## 2020-11-13 NOTE — PROGRESS NOTES
Past Medical History:   Diagnosis Date     Abnormal Pap smear 2006?    dysplasia  X 1; paps OK since then...     Arm DVT (deep venous thromboembolism), acute (H) 2008    hx with phlebitis in IVs after a surgery     Breast disorder 2016    2D mammo, then 3D & u/s to r/o = scar tissue from reduction     Cholesterol serum elevated     runs in family     Complication of anesthesia 2000?, 2016    very slow to awake from both gen. anesth. & conscious sedati     Depression      Encounter for insertion of mirena IUD 2011    D/C'd w increased acne     Hypertension 2017&2018    jamin. during exercise, caused by stimulant for depression?     IBS (irritable bowel syndrome) 2002    under control, better with probiotic     Menarche age 15    cycles q 1-2 months Xs 3-4 d w bad cramps     Migraines 2008    a lot better now, may be stress related     OCD (obsessive compulsive disorder)      Seasonal allergies      Patient Active Problem List   Diagnosis     Depression     OCD (obsessive compulsive disorder)     IBS (irritable bowel syndrome)     Migraines     Hyperlipidemia     Contraception -- abstinence     Diaphoresis     Plantar fascial fibromatosis     Acne     Hair loss     Anxiety associated with depression     Right-sided low back pain without sciatica     ALIVIA (obstructive sleep apnea)     Essential hypertension     Past Surgical History:   Procedure Laterality Date     BIOPSY  2000    dermatology-moles     HEAD & NECK SURGERY  1990?, 2016    wisdom teeth extracted, gum tissue grafting/oral surgery     LASIK Bilateral 2008     MAMMOPLASTY REDUCTION BILATERAL  1197     Social History     Socioeconomic History     Marital status: Single     Spouse name: Not on file     Number of children: Not on file     Years of education: Not on file     Highest education level: Not on file   Occupational History     Occupation: [employer]     Employer: [employer]   Social Needs     Financial resource strain: Not on file     Food  insecurity     Worry: Not on file     Inability: Not on file     Transportation needs     Medical: Not on file     Non-medical: Not on file   Tobacco Use     Smoking status: Never Smoker     Smokeless tobacco: Never Used   Substance and Sexual Activity     Alcohol use: No     Drug use: No     Sexual activity: Not Currently     Partners: Male     Birth control/protection: Abstinence, Pill   Lifestyle     Physical activity     Days per week: Not on file     Minutes per session: Not on file     Stress: Not on file   Relationships     Social connections     Talks on phone: Not on file     Gets together: Not on file     Attends Muslim service: Not on file     Active member of club or organization: Not on file     Attends meetings of clubs or organizations: Not on file     Relationship status: Not on file     Intimate partner violence     Fear of current or ex partner: Not on file     Emotionally abused: Not on file     Physically abused: Not on file     Forced sexual activity: Not on file   Other Topics Concern     Parent/sibling w/ CABG, MI or angioplasty before 65F 55M? Not Asked      Service No     Blood Transfusions No     Caffeine Concern No     Occupational Exposure No     Hobby Hazards No     Sleep Concern Yes     Comment: sleeps 18+ hours r/t depression     Stress Concern No     Weight Concern Yes     Special Diet No     Back Care No     Exercise Yes     Bike Helmet Not Asked     Seat Belt Not Asked     Self-Exams Not Asked   Social History Narrative    How much exercise per week? Daily actvities Very littleHow much calcium per day? 600mg   How much caffeine per day? 1 can diet sodaHow much vitamin D per day? SupplementDo you/your family wear seatbelts?  YesDo you/your family use safety helmets? YesDo you/your family use sunscreen? YesDo you/your family keep firearms in the home? NoDo you/your family have a smoke detector(s)? YesDo you feel safe in your home? YesHas anyone ever touched you in an  unwanted manner? No Explain Carmen Morrissey, [facility] 01/08/2015    [facility]     Family History   Problem Relation Age of Onset     Cancer Father 57        bladder ca     Genitourinary Problems Father         Polycystic kidney     Other Cancer Father         bladder cancer     Genetic Disorder Father         polycystic kidneys     Cancer - colorectal Paternal Grandmother 75     Colon Cancer Paternal Grandmother      Mental Illness Other         SeeBelow     Cancer Mother         skin cancer     Hyperlipidemia Mother         runs in Mom's family-several     Other Cancer Mother         skin cancer     Asthma Mother         runs in Mom's family     Skin Cancer Mother      Alcohol/Drug Paternal Aunt         Xs 2     Thyroid Disease Maternal Grandmother         hypothyroid     Obesity Maternal Grandmother      Coronary Artery Disease Maternal Grandfather         heart attack     Mental Illness Other         Schizophrenia     Mental Illness Cousin         OCD     Depression Other         chronic and episodic     Mental Illness Other         ChronicMajorDepression     Anxiety Disorder Other         gen. anxiety disorder     Mental Illness Other         OCD     Substance Abuse Other         alcoholism-runs in Dad's family     Genetic Disorder Other         polycystic kidneys     Genetic Disorder Brother         [id number]     Asthma Brother      Deep Vein Thrombosis (DVT) Brother      Genetic Disorder Other         [id number]     C.A.D. No family hx of      Hypertension No family hx of      Breast Cancer No family hx of      SUBJECTIVE FINDINGS:  A 45-year-old female returns to clinic for onychomycosis bilaterally.  She relates it is doing okay.  She feels it is getting better.  She is using the Jublia with no problems with that.      OBJECTIVE FINDINGS:  Vascular status is intact bilaterally.  She has right and left hallux nails and left second toenail with dystrophy, discoloration, thickening and  subungual debris with proximal nail about longterm growing in clear.  She has some dystrophy and discoloration of the other nails, too, with incurvation.       ASSESSMENT AND PLAN:  Onychomycosis bilaterally.  Diagnosis and treatment were discussed with her.  She was advised on vinegar soaks.  Continue the Jublia.  This is improving.  All of the nails were debrided or reduced bilaterally upon consent.  She will return to clinic and see me as needed.

## 2020-11-13 NOTE — LETTER
11/13/2020         RE: Mireille Walls  [address] [place] [address]        Dear Colleague,    Thank you for referring your patient, Mireille Walls, to the [facility]. Please see a copy of my visit note below.    Past Medical History:   Diagnosis Date     Abnormal Pap smear 2006?    dysplasia  X 1; paps OK since then...     Arm DVT (deep venous thromboembolism), acute (H) 2008    hx with phlebitis in IVs after a surgery     Breast disorder 2016    2D mammo, then 3D & u/s to r/o = scar tissue from reduction     Cholesterol serum elevated     runs in family     Complication of anesthesia 2000?, 2016    very slow to awake from both gen. anesth. & conscious sedati     Depression      Encounter for insertion of mirena IUD 2011    D/C'd w increased acne     Hypertension 2017&2018    jamin. during exercise, caused by stimulant for depression?     IBS (irritable bowel syndrome) 2002    under control, better with probiotic     Menarche age 15    cycles q 1-2 months Xs 3-4 d w bad cramps     Migraines 2008    a lot better now, may be stress related     OCD (obsessive compulsive disorder)      Seasonal allergies      Patient Active Problem List   Diagnosis     Depression     OCD (obsessive compulsive disorder)     IBS (irritable bowel syndrome)     Migraines     Hyperlipidemia     Contraception -- abstinence     Diaphoresis     Plantar fascial fibromatosis     Acne     Hair loss     Anxiety associated with depression     Right-sided low back pain without sciatica     ALIVIA (obstructive sleep apnea)     Essential hypertension     Past Surgical History:   Procedure Laterality Date     BIOPSY  2000    dermatology-moles     HEAD & NECK SURGERY  1990?, 2016    wisdom teeth extracted, gum tissue grafting/oral surgery     LASIK Bilateral 2008     MAMMOPLASTY REDUCTION BILATERAL  1197     Social History     Socioeconomic History     Marital status: Single     Spouse name: Not on file     Number  of children: Not on file     Years of education: Not on file     Highest education level: Not on file   Occupational History     Occupation: [employer]     Employer: [employer]   Social Needs     Financial resource strain: Not on file     Food insecurity     Worry: Not on file     Inability: Not on file     Transportation needs     Medical: Not on file     Non-medical: Not on file   Tobacco Use     Smoking status: Never Smoker     Smokeless tobacco: Never Used   Substance and Sexual Activity     Alcohol use: No     Drug use: No     Sexual activity: Not Currently     Partners: Male     Birth control/protection: Abstinence, Pill   Lifestyle     Physical activity     Days per week: Not on file     Minutes per session: Not on file     Stress: Not on file   Relationships     Social connections     Talks on phone: Not on file     Gets together: Not on file     Attends Advent service: Not on file     Active member of club or organization: Not on file     Attends meetings of clubs or organizations: Not on file     Relationship status: Not on file     Intimate partner violence     Fear of current or ex partner: Not on file     Emotionally abused: Not on file     Physically abused: Not on file     Forced sexual activity: Not on file   Other Topics Concern     Parent/sibling w/ CABG, MI or angioplasty before 65F 55M? Not Asked      Service No     Blood Transfusions No     Caffeine Concern No     Occupational Exposure No     Hobby Hazards No     Sleep Concern Yes     Comment: sleeps 18+ hours r/t depression     Stress Concern No     Weight Concern Yes     Special Diet No     Back Care No     Exercise Yes     Bike Helmet Not Asked     Seat Belt Not Asked     Self-Exams Not Asked   Social History Narrative    How much exercise per week? Daily actvities Very littleHow much calcium per day? 600mg   How much caffeine per day? 1 can diet sodaHow much vitamin D per day? SupplementDo you/your family wear  seatbelts?  YesDo you/your family use safety helmets? YesDo you/your family use sunscreen? YesDo you/your family keep firearms in the home? NoDo you/your family have a smoke detector(s)? YesDo you feel safe in your home? YesHas anyone ever touched you in an unwanted manner? No Explain Carmen Morrissey, [facility] 01/08/2015    [facility]     Family History   Problem Relation Age of Onset     Cancer Father 57        bladder ca     Genitourinary Problems Father         Polycystic kidney     Other Cancer Father         bladder cancer     Genetic Disorder Father         polycystic kidneys     Cancer - colorectal Paternal Grandmother 75     Colon Cancer Paternal Grandmother      Mental Illness Other         SeeBelow     Cancer Mother         skin cancer     Hyperlipidemia Mother         runs in Mom's family-several     Other Cancer Mother         skin cancer     Asthma Mother         runs in Mom's family     Skin Cancer Mother      Alcohol/Drug Paternal Aunt         Xs 2     Thyroid Disease Maternal Grandmother         hypothyroid     Obesity Maternal Grandmother      Coronary Artery Disease Maternal Grandfather         heart attack     Mental Illness Other         Schizophrenia     Mental Illness Cousin         OCD     Depression Other         chronic and episodic     Mental Illness Other         ChronicMajorDepression     Anxiety Disorder Other         gen. anxiety disorder     Mental Illness Other         OCD     Substance Abuse Other         alcoholism-runs in Dad's family     Genetic Disorder Other         polycystic kidneys     Genetic Disorder Brother         [id number]     Asthma Brother      Deep Vein Thrombosis (DVT) Brother      Genetic Disorder Other         [id number]     C.A.D. No family hx of      Hypertension No family hx of      Breast Cancer No family hx of      SUBJECTIVE FINDINGS:  A 45-year-old female returns to clinic for onychomycosis bilaterally.  She relates it is doing okay.  She  feels it is getting better.  She is using the Jublia with no problems with that.      OBJECTIVE FINDINGS:  Vascular status is intact bilaterally.  She has right and left hallux nails and left second toenail with dystrophy, discoloration, thickening and subungual debris with proximal nail about nursing home growing in clear.  She has some dystrophy and discoloration of the other nails, too, with incurvation.       ASSESSMENT AND PLAN:  Onychomycosis bilaterally.  Diagnosis and treatment were discussed with her.  She was advised on vinegar soaks.  Continue the Jublia.  This is improving.  All of the nails were debrided or reduced bilaterally upon consent.  She will return to clinic and see me as needed.           Again, thank you for allowing me to participate in the care of your patient.        Sincerely,        Hayden Quiñonez DPM

## 2020-11-13 NOTE — NURSING NOTE
Mrieille Walls's chief complaint for this visit includes:  Chief Complaint   Patient presents with     RECHECK     4 month follow up toe nails     PCP: No Ref-Primary, Physician    Referring Provider:  No referring provider defined for this encounter.    /66 (BP Location: Left arm, Patient Position: Sitting, Cuff Size: Adult Regular)   Pulse 68   Data Unavailable     Do you need any medication refills at today's visit? No

## 2020-11-13 NOTE — PATIENT INSTRUCTIONS
Thanks for coming today.  [facility]  [address] [place] [address]    To schedule future appointments in Ortho Clinic, you may call [phone].    To schedule ordered imaging by your provider:   Call Central Imaging [phone]    To schedule an injection ordered by your provider:  Call [facility] Injection scheduling line: [phone]  [employer] available online at:  [email or web address]    Please call if any further questions or concerns ([phone]).  Clinic hours 8 am to 5 pm.    Return to clinic (call) if symptoms worsen or fail to improve.

## 2020-12-30 ENCOUNTER — MEDICAL CORRESPONDENCE (OUTPATIENT)
Dept: HEALTH INFORMATION MANAGEMENT | Facility: CLINIC | Age: 45
End: 2020-12-30

## 2021-03-17 DIAGNOSIS — L70.0 ACNE VULGARIS: ICD-10-CM

## 2021-03-22 RX ORDER — SPIRONOLACTONE 50 MG/1
TABLET, FILM COATED ORAL
Qty: 270 TABLET | Refills: 0 | Status: SHIPPED | OUTPATIENT
Start: 2021-03-22 | End: 2021-08-03

## 2021-03-22 NOTE — TELEPHONE ENCOUNTER
Received refill request for spironolactone. Refill request approved for 3 month supply. Recommend scheduling appointment for follow up. Will CC scheduling pool.

## 2021-03-22 NOTE — TELEPHONE ENCOUNTER
spironolactone (ALDACTONE) 50 MG tablet      Last Written Prescription Date:  10/6/2020  Last Fill Quantity: 120,   # refills: 3  Last Office Visit : 4/23/2020  Future Office visit:  none    Routing refill request to provider for review/approval because:  Refill instructions and labs  - per [employer] message 10/7/2020 patient taking 50 mg AM and 100 mg PM  - last ordered as 100 mg AM and 50 mg PM and if tolerate- 100 mg BID  - pended as 50 mg AM and 100 mg PM per provider response as stable at this dose.  - labs past due: Creatinine, Sodium

## 2021-05-03 ENCOUNTER — TELEPHONE (OUTPATIENT)
Dept: OBGYN | Facility: CLINIC | Age: 46
End: 2021-05-03

## 2021-05-03 NOTE — TELEPHONE ENCOUNTER
Refill request received for rizatriptan benzoate. Last in clinic 12/2019. [employer] To Get Approved For message sent to patient to inform her she needs to make an appointment to be seen to refill medication.

## 2021-06-15 ENCOUNTER — MEDICAL CORRESPONDENCE (OUTPATIENT)
Dept: HEALTH INFORMATION MANAGEMENT | Facility: CLINIC | Age: 46
End: 2021-06-15

## 2021-06-16 ENCOUNTER — TELEPHONE (OUTPATIENT)
Dept: PSYCHIATRY | Facility: CLINIC | Age: 46
End: 2021-06-16

## 2021-06-16 NOTE — TELEPHONE ENCOUNTER
Received referral from Kavita Morales MD @ [facility] for TMS for treatment resistant depression.    Call patient to schedule appointment [phone]

## 2021-07-29 DIAGNOSIS — L70.0 ACNE VULGARIS: ICD-10-CM

## 2021-08-02 DIAGNOSIS — L70.0 ACNE VULGARIS: ICD-10-CM

## 2021-08-02 RX ORDER — SPIRONOLACTONE 50 MG/1
TABLET, FILM COATED ORAL
Qty: 270 TABLET | OUTPATIENT
Start: 2021-08-02

## 2021-08-02 NOTE — TELEPHONE ENCOUNTER
[facility] Call Center    Phone Message    May a detailed message be left on voicemail: yes     Reason for Call: Medication Question or concern regarding medication   Prescription Clarification  Name of Medication: spironolactone (ALDACTONE) 50 MG tablet  Prescribing Provider: Dr. Mg   Pharmacy: [place], [place] - [address]   What on the order needs clarification? Pt scheduled for 11/09 and would like to know if she would be able to get Rx before Appt? Please call Pt back to discuss. Thank you          Action Taken: Message routed to:  [facility] Derm    Travel Screening: Not Applicable

## 2021-08-02 NOTE — TELEPHONE ENCOUNTER
spironolactone (ALDACTONE) 50 MG tablet    Take 1 tablet (50 mg) by mouth every morning AND 2 tablets (100 mg) every evening. - Oral    Last Written Prescription Date:  3/22/21  Last Fill Quantity: 270,   # refills: 0  Last Office Visit : 4/23/20  Future Office visit: none    Process # 2    Denied. Overdue visit/ labs - Na+, K+ and Cr.    Scheduling has been notified to contact the pt for appointment.

## 2021-08-03 RX ORDER — SPIRONOLACTONE 50 MG/1
TABLET, FILM COATED ORAL
Qty: 270 TABLET | Refills: 3 | Status: SHIPPED | OUTPATIENT
Start: 2021-08-03 | End: 2023-02-19

## 2021-08-03 RX ORDER — SPIRONOLACTONE 50 MG/1
TABLET, FILM COATED ORAL
Qty: 270 TABLET | Refills: 3 | Status: SHIPPED | OUTPATIENT
Start: 2021-08-03 | End: 2021-08-03

## 2021-08-03 NOTE — TELEPHONE ENCOUNTER
Pt called again. Would like medication filled at  [facility]. Is going out of town 8/5, needs before then

## 2021-08-03 NOTE — TELEPHONE ENCOUNTER
Patient's last office visit was 04/23 and has an appointment scheduled 11/09/21.     Josephine Pardo CMA

## 2021-08-04 RX ORDER — SPIRONOLACTONE 50 MG/1
TABLET, FILM COATED ORAL
Qty: 270 TABLET | Refills: 0 | OUTPATIENT
Start: 2021-08-04

## 2021-08-10 ENCOUNTER — OFFICE VISIT (OUTPATIENT)
Dept: URGENT CARE | Facility: URGENT CARE | Age: 46
End: 2021-08-10
Payer: COMMERCIAL

## 2021-08-10 VITALS
TEMPERATURE: 98.3 F | HEART RATE: 76 BPM | DIASTOLIC BLOOD PRESSURE: 77 MMHG | OXYGEN SATURATION: 97 % | SYSTOLIC BLOOD PRESSURE: 117 MMHG

## 2021-08-10 DIAGNOSIS — R21 RASH AND NONSPECIFIC SKIN ERUPTION: Primary | ICD-10-CM

## 2021-08-10 LAB
BASOPHILS # BLD AUTO: 0.1 10E3/UL (ref 0–0.2)
BASOPHILS NFR BLD AUTO: 1 %
EOSINOPHIL # BLD AUTO: 0.5 10E3/UL (ref 0–0.7)
EOSINOPHIL NFR BLD AUTO: 4 %
ERYTHROCYTE [DISTWIDTH] IN BLOOD BY AUTOMATED COUNT: 12.6 % (ref 10–15)
HCT VFR BLD AUTO: 41.8 % (ref 35–47)
HGB BLD-MCNC: 13.9 G/DL (ref 11.7–15.7)
LYMPHOCYTES # BLD AUTO: 3.2 10E3/UL (ref 0.8–5.3)
LYMPHOCYTES NFR BLD AUTO: 27 %
MCH RBC QN AUTO: 31 PG (ref 26.5–33)
MCHC RBC AUTO-ENTMCNC: 33.3 G/DL (ref 31.5–36.5)
MCV RBC AUTO: 93 FL (ref 78–100)
MONOCYTES # BLD AUTO: 0.8 10E3/UL (ref 0–1.3)
MONOCYTES NFR BLD AUTO: 7 %
NEUTROPHILS # BLD AUTO: 7.1 10E3/UL (ref 1.6–8.3)
NEUTROPHILS NFR BLD AUTO: 61 %
PLATELET # BLD AUTO: 334 10E3/UL (ref 150–450)
RBC # BLD AUTO: 4.48 10E6/UL (ref 3.8–5.2)
WBC # BLD AUTO: 11.6 10E3/UL (ref 4–11)

## 2021-08-10 PROCEDURE — 85025 COMPLETE CBC W/AUTO DIFF WBC: CPT | Performed by: INTERNAL MEDICINE

## 2021-08-10 PROCEDURE — 86757 RICKETTSIA ANTIBODY: CPT | Mod: 90 | Performed by: INTERNAL MEDICINE

## 2021-08-10 PROCEDURE — 99214 OFFICE O/P EST MOD 30 MIN: CPT | Performed by: INTERNAL MEDICINE

## 2021-08-10 PROCEDURE — 99000 SPECIMEN HANDLING OFFICE-LAB: CPT | Performed by: INTERNAL MEDICINE

## 2021-08-10 PROCEDURE — 80053 COMPREHEN METABOLIC PANEL: CPT | Performed by: INTERNAL MEDICINE

## 2021-08-10 PROCEDURE — 36415 COLL VENOUS BLD VENIPUNCTURE: CPT | Performed by: INTERNAL MEDICINE

## 2021-08-10 RX ORDER — TRIAMCINOLONE ACETONIDE 1 MG/G
CREAM TOPICAL 2 TIMES DAILY
Qty: 80 G | Refills: 0 | Status: SHIPPED | OUTPATIENT
Start: 2021-08-10 | End: 2022-04-20

## 2021-08-10 RX ORDER — DOXYCYCLINE HYCLATE 100 MG
100 TABLET ORAL 2 TIMES DAILY
Qty: 14 TABLET | Refills: 0 | Status: SHIPPED | OUTPATIENT
Start: 2021-08-10 | End: 2021-08-17

## 2021-08-11 ENCOUNTER — TELEPHONE (OUTPATIENT)
Dept: DERMATOLOGY | Facility: CLINIC | Age: 46
End: 2021-08-11

## 2021-08-11 LAB
ALBUMIN SERPL-MCNC: 3.8 G/DL (ref 3.4–5)
ALP SERPL-CCNC: 74 U/L (ref 40–150)
ALT SERPL W P-5'-P-CCNC: 22 U/L (ref 0–50)
ANION GAP SERPL CALCULATED.3IONS-SCNC: 3 MMOL/L (ref 3–14)
AST SERPL W P-5'-P-CCNC: 14 U/L (ref 0–45)
BILIRUB SERPL-MCNC: 0.1 MG/DL (ref 0.2–1.3)
BUN SERPL-MCNC: 15 MG/DL (ref 7–30)
CALCIUM SERPL-MCNC: 8.9 MG/DL (ref 8.5–10.1)
CHLORIDE BLD-SCNC: 111 MMOL/L (ref 94–109)
CO2 SERPL-SCNC: 28 MMOL/L (ref 20–32)
CREAT SERPL-MCNC: 0.97 MG/DL (ref 0.52–1.04)
GFR SERPL CREATININE-BSD FRML MDRD: 70 ML/MIN/1.73M2
GLUCOSE BLD-MCNC: 91 MG/DL (ref 70–99)
POTASSIUM BLD-SCNC: 4.5 MMOL/L (ref 3.4–5.3)
PROT SERPL-MCNC: 7.1 G/DL (ref 6.8–8.8)
SODIUM SERPL-SCNC: 142 MMOL/L (ref 133–144)

## 2021-08-11 NOTE — TELEPHONE ENCOUNTER
[facility] Call Center    Phone Message    May a detailed message be left on voicemail: yes     Reason for Call: Pt would like to schedule Appt and would like to be seen ASAP. Pt has a severe rash on arms and face. Pt does not know what is causing it and would like to be seen soon. I am not able to schedule Pt any time soon. Please call Pt to see her next step. Pt was in the Urgent care 08/10 due to rash. Thank you    Action Taken: Message routed to:  [facility] Derm    Travel Screening: Not Applicable

## 2021-08-11 NOTE — PROGRESS NOTES
SUBJECTIVE:  Mireille Walls is an 46 year old female who presents for rash.  Was in [place] and woke up with rash on arms and face two days ago.  Has worsened since onset.  Initially was dots, now dots are merging together and forming areas of redness.  The rash is a burning kind of pain.  Not itchy.  Cortisone cream didn't help.  Has pain at wrists when moves them because of the rash.  Not have rash on legs other than a couple random red dots on thighs.  No sores or swelling in mouth.  Did have contact with an inflatable bed, but not sure if material contact triggered it.  Not aware of anything she came in contact with that triggered it, but has had a similar rash in reaction to something before. No fevers, chills sweats.  Did have a headache a couple days ago which resolved.    PMH:   has a past medical history of Abnormal Pap smear (2006?), Arm DVT (deep venous thromboembolism), acute (H) (2008), Breast disorder (2016), Cholesterol serum elevated, Complication of anesthesia (2000?, 2016), Depression, Encounter for insertion of mirena IUD (2011), Hypertension (2017&2018), IBS (irritable bowel syndrome) (2002), Menarche (age 15), Migraines (2008), OCD (obsessive compulsive disorder), and Seasonal allergies.  Patient Active Problem List   Diagnosis     Depression     OCD (obsessive compulsive disorder)     IBS (irritable bowel syndrome)     Migraines     Hyperlipidemia     Contraception -- abstinence     Diaphoresis     Plantar fascial fibromatosis     Acne     Hair loss     Anxiety associated with depression     Right-sided low back pain without sciatica     ALIVIA (obstructive sleep apnea)     Essential hypertension     Social History     Socioeconomic History     Marital status: Single     Spouse name: Not on file     Number of children: Not on file     Years of education: Not on file     Highest education level: Not on file   Occupational History     Occup[place]: [employer] [place]     Employer: [employer]    Tobacco Use     Smoking status: Never Smoker     Smokeless tobacco: Never Used   Substance and Sexual Activity     Alcohol use: No     Drug use: No     Sexual activity: Not Currently     Partners: Male     Birth control/protection: Abstinence, Pill   Other Topics Concern     Parent/sibling w/ CABG, MI or angioplasty before 65F 55M? Not Asked      Service No     Blood Transfusions No     Caffeine Concern No     Occupational Exposure No     Hobby Hazards No     Sleep Concern Yes     Comment: sleeps 18+ hours r/t depression     Stress Concern No     Weight Concern Yes     Special Diet No     Back Care No     Exercise Yes     Bike Helmet Not Asked     Seat Belt Not Asked     Self-Exams Not Asked   Social History Narrative    How much exercise per week? Daily actvities Very littleHow much calcium per day? 600mg   How much caffeine per day? 1 can diet sodaHow much vitamin D per day? SupplementDo you/your family wear seatbelts?  YesDo you/your family use safety helmets? YesDo you/your family use sunscreen? YesDo you/your family keep firearms in the home? NoDo you/your family have a smoke detector(s)? YesDo you feel safe in your home? YesHas anyone ever touched you in an unwanted manner? No Explain Carmen Morrissey CMA 01/08/2015    Reviewed [facility] 6-     Social Determinants of Health     Financial Resource Strain:      Difficulty of Paying Living Expenses:    Food Insecurity:      Worried About Running Out of Food in the Last Year:      Ran Out of Food in the Last Year:    Transportation Needs:      Lack of Transportation (Medical):      Lack of Transportation (Non-Medical):    Physical Activity:      Days of Exercise per Week:      Minutes of Exercise per Session:    Stress:      Feeling of Stress :    Social Connections:      Frequency of Communication with Friends and Family:      Frequency of Social Gatherings with Friends and Family:      Attends Spiritism Services:      Active Member of Clubs or  Organizations:      Attends Club or Organization Meetings:      Marital Status:    Intimate Partner Violence:      Fear of Current or Ex-Partner:      Emotionally Abused:      Physically Abused:      Sexually Abused:      Family History   Problem Relation Age of Onset     Cancer Father 57        bladder ca     Genitourinary Problems Father         Polycystic kidney     Other Cancer Father         bladder cancer     Genetic Disorder Father         polycystic kidneys     Cancer - colorectal Paternal Grandmother 75     Colon Cancer Paternal Grandmother      Mental Illness Other         SeeBelow     Cancer Mother         skin cancer     Hyperlipidemia Mother         runs in Mom's family-several     Other Cancer Mother         skin cancer     Asthma Mother         runs in Mom's family     Skin Cancer Mother      Alcohol/Drug Paternal Aunt         Xs 2     Thyroid Disease Maternal Grandmother         hypothyroid     Obesity Maternal Grandmother      Coronary Artery Disease Maternal Grandfather         heart attack     Mental Illness Other         Schizophrenia     Mental Illness Cousin         OCD     Depression Other         chronic and episodic     Mental Illness Other         ChronicMajorDepression     Anxiety Disorder Other         gen. anxiety disorder     Mental Illness Other         OCD     Substance Abuse Other         alcoholism-runs in Dad's family     Genetic Disorder Other         polycystic kidneys     Genetic Disorder Brother         [id number]     Asthma Brother      Deep Vein Thrombosis (DVT) Brother      Genetic Disorder Other         [id number]     C.A.D. No family hx of      Hypertension No family hx of      Breast Cancer No family hx of        ALLERGIES:  Phenergan vc [promethazine vc]    Current Outpatient Medications   Medication     buPROPion (WELLBUTRIN XL) 150 MG 24 hr tablet     CALCIUM PO     Cholecalciferol (D3 VITAMIN PO)     COD LIVER OIL PO     hydrocortisone 2.5 % cream      ketoconazole (NIZORAL) 2 % external shampoo     loratadine (CLARITIN) 10 MG tablet     Multiple Vitamins-Minerals (MULTIPLE VITAMINS/WOMENS PO)     Probiotic Product (PROBIOTIC PO)     rizatriptan (MAXALT-MLT) 5 MG ODT     spironolactone (ALDACTONE) 50 MG tablet     vortioxetine (TRINTELLIX) 10 MG tablet     No current facility-administered medications for this visit.         ROS:  ROS is done and is negative for general/constitutional, eye, ENT, Respiratory, cardiovascular, GI, , Skin, musculoskeletal except as noted elsewhere.  All other review of systems negative except as noted elsewhere.      OBJECTIVE:  /77   Pulse 76   Temp 98.3  F (36.8  C) (Oral)   SpO2 97%   GENERAL APPEARANCE: Alert, in no acute distress  EYES: normal  NOSE:normal  OROPHARYNX:normal  NECK:No adenopathy,masses or thyromegaly  RESP: normal and clear to auscultation  CV:regular rate and rhythm and no murmurs, clicks, or gallops  ABDOMEN: Abdomen soft, non-tender. BS normal. No masses, organomegaly  SKIN: blanching erythematous rash with papules macules scattered over bilateral arms, with a few lesions on face and proximal medial thighs.  The most affected area is the wrists where papules have coalesced into area of erythema  MUSCULOSKELETAL:Musculoskeletal normal      RESULTS  No results found for any visits on 08/10/21..  No results found for this or any previous visit (from the past 48 hour(s)).    ASSESSMENT/PLAN:    ASSESSMENT / PLAN:  (R21) Rash and nonspecific skin eruption  (primary encounter diagnosis)  Comment: the appearance of the rash has features that could be c/w blaze mountain spotted fever.  No other significant sxs or known tick exposure, but will treat with doxy and check labs.  Will check antibody levels, as not have pcr testing available for rmsf.  Is also possible rash is reaction to some exposure, so will also have pt use triamcinolone cream  Plan: Rickettsia rickettsii antibody IgG & IgM, CBC         with  platelets and differential, Comprehensive         metabolic panel (BMP + Alb, Alk Phos, ALT, AST,        Total. Bili, TP), doxycycline hyclate         (VIBRA-TABS) 100 MG tablet, triamcinolone         (KENALOG) 0.1 % external cream        Await labs.  Reviewed medication instructions and side effects including sun sensitivity.  Pt to complete doxy even if antibody titers are negative. Follow up if experiences side effects.  I reviewed supportive care, otc meds to use if needed, expected course, and signs of concern.  Follow up as needed or if she does not improve within 1 week(s) or if worsens in any way.  Reviewed red flag symptoms and is to go to the ER if experiences any of these..      PPE worn: mask and shield.    See [facility] for orders, medications, letters, patient instructions    Anabella Haq M.D.

## 2021-08-12 ENCOUNTER — OFFICE VISIT (OUTPATIENT)
Dept: DERMATOLOGY | Facility: CLINIC | Age: 46
End: 2021-08-12
Payer: COMMERCIAL

## 2021-08-12 DIAGNOSIS — L23.89 ALLERGIC CONTACT DERMATITIS DUE TO OTHER AGENTS: Primary | ICD-10-CM

## 2021-08-12 DIAGNOSIS — L70.0 ACNE VULGARIS: ICD-10-CM

## 2021-08-12 PROCEDURE — 99213 OFFICE O/P EST LOW 20 MIN: CPT | Mod: GC | Performed by: STUDENT IN AN ORGANIZED HEALTH CARE EDUCATION/TRAINING PROGRAM

## 2021-08-12 ASSESSMENT — PAIN SCALES - GENERAL: PAINLEVEL: MILD PAIN (2)

## 2021-08-12 NOTE — NURSING NOTE
Dermatology Rooming Note    Mireille Walls's goals for this visit include:   Chief Complaint   Patient presents with     Rash     Mireille is here today for a rash on her arms,hands and face.      SHAE Alvarado

## 2021-08-12 NOTE — PROGRESS NOTES
[facility] Dermatology Note  Encounter Date: Aug 12, 2021  Office Visit     Dermatology Problem List:  1. Seborrheic dermatitis: Ketoconazole shampoo 3 times weekly  2. Nummular dermatitis of the central forehead with a negative KOH scraping on July 11, 2019: Hydrocortisone 2.5% cream twice daily for 2 to 3 weeks  3. Acne vulgaris, hormonal.  - Spironolactone 100 mg every day, started 1/30/20, increased to 150-200 mg 4/23/2020, current 100mg  - Consider isotretinoin in future if worsening  4. Allergic contact dermatitis  Current tx: doxycycline (7d course), TAC 0.1%    ____________________________________________    Assessment & Plan:   # posterior arm/forearm/dorsal hand urticaria and edema  Suspect allergic contact dermatitis vs. Allergic dermatitis. Low suspicion for infectious entities at this time. Recommending continuing to complete doxycycline course (and reiterated importance of avoiding sun and other ADRs). Instructed to continue TAC at this time and monitor for continued improvement  -continue TAC  -continue doxycycline (to complete 7d course)  -f/u in 2 months as previously scheduled    #Acne vulgaris  Stable, no issues. Encouraged continue medications and monitoring. Discussed ADRs of medications.    Procedures Performed:   None    Follow-up: 2 month(s) in-person, or earlier for new or changing lesions (as previously scheduled)  Staff and Resident:     Rob  ____________________________________________    CC: Rash (Mireille is here today for a rash on her arms,hands and face. )    HPI:  Ms. Mireille Walls is a(n) 46 year old female who presents today as a return patient for new rash of arms and feet.    Patient went to [place] and used a new air mattress on Sunday (4 days prior to visit) and noted after having burning rash that was on her posterior arms and forearms and dorsal hands with edema in her hands. Saw urgent care on Monday and was given doxycycline for possible RMSF or other  tick-borne disease and TAC ointmenet. Has noticed significant improvement. Takes claritin regularly. Feels rash getting better but wants to make sure it is resolving and not contagious.    On spironolactone for acne and denies lightheadedness/dizziness, breast tenderness, or spotting or irregular periods.    Patient is otherwise feeling well, without additional skin concerns.    Labs Reviewed:  Mild leukocytosis to 11.6    Physical Exam:  Vitals: There were no vitals taken for this visit.  SKIN: Sun-exposed skin, which includes the head/face, neck, both arms, digits, and/or nails was examined.   - on the posterior forearms and arms and dorsal hands is an urticarial erythematous papules and plaques  - on the dorsal R hand is mild edema around wrist  - No other lesions of concern on areas examined.     Medications:  Current Outpatient Medications   Medication     buPROPion (WELLBUTRIN XL) 150 MG 24 hr tablet     CALCIUM PO     Cholecalciferol (D3 VITAMIN PO)     COD LIVER OIL PO     doxycycline hyclate (VIBRA-TABS) 100 MG tablet     hydrocortisone 2.5 % cream     ketoconazole (NIZORAL) 2 % external shampoo     loratadine (CLARITIN) 10 MG tablet     Multiple Vitamins-Minerals (MULTIPLE VITAMINS/WOMENS PO)     Probiotic Product (PROBIOTIC PO)     rizatriptan (MAXALT-MLT) 5 MG ODT     spironolactone (ALDACTONE) 50 MG tablet     triamcinolone (KENALOG) 0.1 % external cream     vortioxetine (TRINTELLIX) 10 MG tablet     No current facility-administered medications for this visit.      Past Medical History:   Patient Active Problem List   Diagnosis     Depression     OCD (obsessive compulsive disorder)     IBS (irritable bowel syndrome)     Migraines     Hyperlipidemia     Contraception -- abstinence     Diaphoresis     Plantar fascial fibromatosis     Acne     Hair loss     Anxiety associated with depression     Right-sided low back pain without sciatica     ALIVIA (obstructive sleep apnea)     Essential hypertension     Past  Medical History:   Diagnosis Date     Abnormal Pap smear 2006?    dysplasia  X 1; paps OK since then...     Arm DVT (deep venous thromboembolism), acute (H) 2008    hx with phlebitis in IVs after a surgery     Breast disorder 2016    2D mammo, then 3D & u/s to r/o = scar tissue from reduction     Cholesterol serum elevated     runs in family     Complication of anesthesia 2000?, 2016    very slow to awake from both gen. anesth. & conscious sedati     Depression      Encounter for insertion of mirena IUD 2011    D/C'd w increased acne     Hypertension 2017&2018    jamin. during exercise, caused by stimulant for depression?     IBS (irritable bowel syndrome) 2002    under control, better with probiotic     Menarche age 15    cycles q 1-2 months Xs 3-4 d w bad cramps     Migraines 2008    a lot better now, may be stress related     OCD (obsessive compulsive disorder)      Seasonal allergies        CC Referred Self, MD  No address on file on close of this encounter.

## 2021-08-12 NOTE — LETTER
8/12/2021       RE: Mireille Walls  [address] [place] [address]     Dear Colleague,    Thank you for referring your patient, Mireille Walls, to the [facility] [place] at [facility]. Please see a copy of my visit note below.    [facility] Dermatology Note  Encounter Date: Aug 12, 2021  Office Visit     Dermatology Problem List:  1. Seborrheic dermatitis: Ketoconazole shampoo 3 times weekly  2. Nummular dermatitis of the central forehead with a negative KOH scraping on July 11, 2019: Hydrocortisone 2.5% cream twice daily for 2 to 3 weeks  3. Acne vulgaris, hormonal.  - Spironolactone 100 mg every day, started 1/30/20, increased to 150-200 mg 4/23/2020, current 100mg  - Consider isotretinoin in future if worsening  4. Allergic contact dermatitis  Current tx: doxycycline (7d course), TAC 0.1%    ____________________________________________    Assessment & Plan:   # posterior arm/forearm/dorsal hand urticaria and edema  Suspect allergic contact dermatitis vs. Allergic dermatitis. Low suspicion for infectious entities at this time. Recommending continuing to complete doxycycline course (and reiterated importance of avoiding sun and other ADRs). Instructed to continue TAC at this time and monitor for continued improvement  -continue TAC  -continue doxycycline (to complete 7d course)  -f/u in 2 months as previously scheduled    #Acne vulgaris  Stable, no issues. Encouraged continue medications and monitoring. Discussed ADRs of medications.    Procedures Performed:   None    Follow-up: 2 month(s) in-person, or earlier for new or changing lesions (as previously scheduled)  Staff and Resident:     Rob  ____________________________________________    CC: Rash (Mireille is here today for a rash on her arms,hands and face. )    HPI:  Ms. Mireille Walls is a(n) 46 year old female who presents today as a return patient for  new rash of arms and feet.    Patient went to [place] and used a new air mattress on Sunday (4 days prior to visit) and noted after having burning rash that was on her posterior arms and forearms and dorsal hands with edema in her hands. Saw urgent care on Monday and was given doxycycline for possible RMSF or other tick-borne disease and TAC ointmenet. Has noticed significant improvement. Takes claritin regularly. Feels rash getting better but wants to make sure it is resolving and not contagious.    On spironolactone for acne and denies lightheadedness/dizziness, breast tenderness, or spotting or irregular periods.    Patient is otherwise feeling well, without additional skin concerns.    Labs Reviewed:  Mild leukocytosis to 11.6    Physical Exam:  Vitals: There were no vitals taken for this visit.  SKIN: Sun-exposed skin, which includes the head/face, neck, both arms, digits, and/or nails was examined.   - on the posterior forearms and arms and dorsal hands is an urticarial erythematous papules and plaques  - on the dorsal R hand is mild edema around wrist  - No other lesions of concern on areas examined.     Medications:  Current Outpatient Medications   Medication     buPROPion (WELLBUTRIN XL) 150 MG 24 hr tablet     CALCIUM PO     Cholecalciferol (D3 VITAMIN PO)     COD LIVER OIL PO     doxycycline hyclate (VIBRA-TABS) 100 MG tablet     hydrocortisone 2.5 % cream     ketoconazole (NIZORAL) 2 % external shampoo     loratadine (CLARITIN) 10 MG tablet     Multiple Vitamins-Minerals (MULTIPLE VITAMINS/WOMENS PO)     Probiotic Product (PROBIOTIC PO)     rizatriptan (MAXALT-MLT) 5 MG ODT     spironolactone (ALDACTONE) 50 MG tablet     triamcinolone (KENALOG) 0.1 % external cream     vortioxetine (TRINTELLIX) 10 MG tablet     No current facility-administered medications for this visit.      Past Medical History:   Patient Active Problem List   Diagnosis     Depression     OCD (obsessive compulsive disorder)     IBS  (irritable bowel syndrome)     Migraines     Hyperlipidemia     Contraception -- abstinence     Diaphoresis     Plantar fascial fibromatosis     Acne     Hair loss     Anxiety associated with depression     Right-sided low back pain without sciatica     ALIVIA (obstructive sleep apnea)     Essential hypertension     Past Medical History:   Diagnosis Date     Abnormal Pap smear 2006?    dysplasia  X 1; paps OK since then...     Arm DVT (deep venous thromboembolism), acute (H) 2008    hx with phlebitis in IVs after a surgery     Breast disorder 2016    2D mammo, then 3D & u/s to r/o = scar tissue from reduction     Cholesterol serum elevated     runs in family     Complication of anesthesia 2000?, 2016    very slow to awake from both gen. anesth. & conscious sedati     Depression      Encounter for insertion of mirena IUD 2011    D/C'd w increased acne     Hypertension 2017&2018    jamin. during exercise, caused by stimulant for depression?     IBS (irritable bowel syndrome) 2002    under control, better with probiotic     Menarche age 15    cycles q 1-2 months Xs 3-4 d w bad cramps     Migraines 2008    a lot better now, may be stress related     OCD (obsessive compulsive disorder)      Seasonal allergies        CC Referred Self, MD  No address on file on close of this encounter.      I talked with and examined Mireille Walls and I agree with the assessment and the plan. CHRISTIAN Rivas MD.

## 2021-08-12 NOTE — LETTER
[facility] DERMATOLOGY CLIN[facility] [address] [address]  Phone: [phone]  Fax: [phone]        To whom it may concern;     This letter is to confirm that Mireille Walls has been evaluated in dermatology clinic on 8/12/2021 and is safe to return to work without any restrictions. Please contact us with any further questions.    Thank you,  Analilia Ochoa MD  Resident Physician, PGY-2  Internal Medicine-Dermatology

## 2021-08-12 NOTE — PROGRESS NOTES
I talked with and examined Mireille Walls and I agree with the assessment and the plan. CHRISTIAN Rivas MD.

## 2021-08-13 LAB
R RICKETTSI IGG TITR SER IF: NORMAL {TITER}
R RICKETTSI IGM TITR SER IF: NORMAL {TITER}

## 2021-09-05 ENCOUNTER — HEALTH MAINTENANCE LETTER (OUTPATIENT)
Age: 46
End: 2021-09-05

## 2021-12-26 ENCOUNTER — HEALTH MAINTENANCE LETTER (OUTPATIENT)
Age: 46
End: 2021-12-26

## 2022-01-25 ENCOUNTER — ANCILLARY PROCEDURE (OUTPATIENT)
Dept: MAMMOGRAPHY | Facility: CLINIC | Age: 47
End: 2022-01-25
Attending: OBSTETRICS & GYNECOLOGY
Payer: COMMERCIAL

## 2022-01-25 DIAGNOSIS — Z12.31 VISIT FOR SCREENING MAMMOGRAM: ICD-10-CM

## 2022-01-25 PROCEDURE — 77063 BREAST TOMOSYNTHESIS BI: CPT | Mod: GC | Performed by: STUDENT IN AN ORGANIZED HEALTH CARE EDUCATION/TRAINING PROGRAM

## 2022-01-25 PROCEDURE — 77067 SCR MAMMO BI INCL CAD: CPT | Mod: GC | Performed by: STUDENT IN AN ORGANIZED HEALTH CARE EDUCATION/TRAINING PROGRAM

## 2022-03-07 NOTE — TELEPHONE ENCOUNTER
DIAGNOSIS: right infected ingrown toenail   APPOINTMENT DATE: 03/16/2022   NOTES STATUS DETAILS   OFFICE NOTE from referring provider N/A    OFFICE NOTE from other specialist N/A    DISCHARGE SUMMARY from hospital N/A    DISCHARGE REPORT from the ER N/A    OPERATIVE REPORT N/A    EMG report N/A    MEDICATION LIST N/A    MRI N/A    DEXA (osteoporosis/bone health) N/A    CT SCAN N/A    XRAYS (IMAGES & REPORTS) N/A

## 2022-03-14 PROBLEM — D68.51 FACTOR 5 LEIDEN MUTATION, HETEROZYGOUS (H): Status: ACTIVE | Noted: 2018-11-14

## 2022-03-14 PROBLEM — M54.50 RIGHT-SIDED LOW BACK PAIN WITHOUT SCIATICA: Status: RESOLVED | Noted: 2018-08-28 | Resolved: 2022-03-14

## 2022-03-16 ENCOUNTER — PRE VISIT (OUTPATIENT)
Dept: PODIATRY | Facility: CLINIC | Age: 47
End: 2022-03-16

## 2022-04-20 ENCOUNTER — OFFICE VISIT (OUTPATIENT)
Dept: URGENT CARE | Facility: URGENT CARE | Age: 47
End: 2022-04-20
Payer: COMMERCIAL

## 2022-04-20 ENCOUNTER — TELEPHONE (OUTPATIENT)
Dept: OTOLARYNGOLOGY | Facility: CLINIC | Age: 47
End: 2022-04-20
Payer: COMMERCIAL

## 2022-04-20 VITALS
HEART RATE: 81 BPM | TEMPERATURE: 97.9 F | SYSTOLIC BLOOD PRESSURE: 138 MMHG | OXYGEN SATURATION: 96 % | WEIGHT: 175.6 LBS | BODY MASS INDEX: 30.14 KG/M2 | DIASTOLIC BLOOD PRESSURE: 84 MMHG

## 2022-04-20 DIAGNOSIS — G43.809 OTHER MIGRAINE WITHOUT STATUS MIGRAINOSUS, NOT INTRACTABLE: ICD-10-CM

## 2022-04-20 DIAGNOSIS — R29.898 WEAKNESS OF LEFT LOWER EXTREMITY: ICD-10-CM

## 2022-04-20 DIAGNOSIS — R42 VERTIGO: Primary | ICD-10-CM

## 2022-04-20 PROCEDURE — 99214 OFFICE O/P EST MOD 30 MIN: CPT | Performed by: EMERGENCY MEDICINE

## 2022-04-20 NOTE — TELEPHONE ENCOUNTER
Has not seen anyone for this and just started yesterday. Explained our clinic requires a referral, she needs to see her PCP first. I also explained the process so she was informed. No further questions.    Ivone Pelaez LPN

## 2022-04-20 NOTE — TELEPHONE ENCOUNTER
[facility] Call Center    Phone Message    May a detailed message be left on voicemail: yes     Reason for Call: Other: Pt called wanting to be seen for Vertigo, this is new to her and she's been experiencing it for the last two days, per Pt.  Sending message per protocols. Please call Pt back to discuss.     Thank you.    Action Taken: Message routed to:  [facility] E[facility]    Travel Screening: Not Applicable

## 2022-04-21 NOTE — PROGRESS NOTES
Assessment & Plan     Diagnosis:    (R42) Vertigo  (primary encounter diagnosis)    (R29.898) Weakness of left lower extremity    (G43.809) Other migraine without status migrainosus, not intractable      Medical Decision Making:  Mireille Walls is a 47 year old female who presents for evaluation of vertigo. I considered a broad differential of course, including peripheral vs central vertigo etiologies and their subsets.  I am concerned given continued symptoms and history that a more serious and central cause may be possible as she does note that her left lower extremity feels weaker than her right. On exam, this is slightly discernable.  Concern for TIA, CVA, other central process of vertigo.  Patient directed to the ER now for further evaluation. Recommended transport by ambulance and she adamantly declines - discussed risks of transport by private vehicle and she voices understanding. GCS 15.  Patient is hemodynamically stable at this time. All questions answered.    Larry Gomez PA-C  [facility]    Subjective     Mireille Walls is a 47 year old female who presents to clinic today for the following health issues:  Chief Complaint   Patient presents with     Dizziness     Vertigo when laying down for two days, heaviness in head and nausea.        HPI  Patient notes that     Review of Systems    See HPI    Objective      Vitals: /84 (BP Location: Left arm, Patient Position: Sitting, Cuff Size: Adult Regular)   Pulse 81   Temp 97.9  F (36.6  C) (Tympanic)   Wt 79.7 kg (175 lb 9.6 oz)   SpO2 96%   BMI 30.14 kg/m    Resp: 14    Patient Vitals for the past 24 hrs:   BP Temp Temp src Pulse SpO2 Weight   04/20/22 1909 138/84 97.9  F (36.6  C) Tympanic 81 96 % 79.7 kg (175 lb 9.6 oz)       Vital signs reviewed by: Larry Gomez PA-C    Physical Exam   Constitutional: Patient is alert and cooperative. Mild acute distress.  Neck: No meningismus. Normal ROM.   Ears: Right TM is clear. Left TM  is clear. External ear canals are normal.  Mouth: Mucous membranes are moist. Normal tongue and tonsil. Posterior oropharynx is clear.  Eyes: horizontal nystagmus noted when looking to the left. Conjunctivae and lids are normal.  Cardiovascular: Regular rate and rhythm. DP/PT and radial pulses 2+ and symmetric.  Pulmonary/Chest: Lungs are clear to auscultation throughout. Effort normal. No respiratory distress. No wheezes, rales or rhonchi.  Neurological: Alert and oriented x3. CN 3-7 and 9-12 intact. Strength 4/5 in the LLE compared with right. Sensation symmetric bilaterally. GCS 15.  Face is symmetric.  Speech is fluent.  Gait is stable.  Skin: No rash noted on visualized skin.  Psychiatric:The patient has a normal mood and affect.         Larry Gomez PA-C, April 20, 2022

## 2022-04-21 NOTE — PATIENT INSTRUCTIONS
Go to the ER now for further evaluation:    At: [facility] [address] , [place] [address]

## 2022-08-29 ENCOUNTER — TELEPHONE (OUTPATIENT)
Dept: DERMATOLOGY | Facility: CLINIC | Age: 47
End: 2022-08-29

## 2022-08-29 NOTE — TELEPHONE ENCOUNTER
[facility] Call Center    Phone Message    May a detailed message be left on voicemail: yes     Reason for Call: Medication Question or concern regarding medication   Prescription Clarification  Name of Medication:  hydrocortisone 2.5 % cream  Prescribing Provider: Gabriela   [facility]: [facility] Ph:[phone]   What on the order needs clarification? Pt needs script sent to different pharmacy. [facility] Ph: [phone]. Thanks!          Action Taken: Message routed to:  [facility] DERM    Travel Screening: Not Applicable

## 2022-10-23 ENCOUNTER — HEALTH MAINTENANCE LETTER (OUTPATIENT)
Age: 47
End: 2022-10-23

## 2023-01-26 ENCOUNTER — OFFICE VISIT (OUTPATIENT)
Dept: OPTOMETRY | Facility: CLINIC | Age: 48
End: 2023-01-26
Payer: COMMERCIAL

## 2023-01-26 DIAGNOSIS — H52.4 PRESBYOPIA: Primary | ICD-10-CM

## 2023-01-26 DIAGNOSIS — H17.9 CORNEAL SCAR: ICD-10-CM

## 2023-01-26 PROCEDURE — 92310 CONTACT LENS FITTING OU: CPT | Mod: GA | Performed by: OPTOMETRIST

## 2023-01-26 PROCEDURE — 92015 DETERMINE REFRACTIVE STATE: CPT | Performed by: OPTOMETRIST

## 2023-01-26 PROCEDURE — 92004 COMPRE OPH EXAM NEW PT 1/>: CPT | Performed by: OPTOMETRIST

## 2023-01-26 ASSESSMENT — EXTERNAL EXAM - RIGHT EYE: OD_EXAM: NORMAL

## 2023-01-26 ASSESSMENT — VISUAL ACUITY
OS_SC: 20/25
OS_SC+: +2
VA_OR_OS_CURRENT_RX: 20/20
METHOD: SNELLEN - LINEAR
OD_SC: 20/20
OD_SC: 20/40
OS_SC: 20/200

## 2023-01-26 ASSESSMENT — REFRACTION_CURRENTRX
OS_BRAND: COOPERVISION MYDAY TORIC
OS_SPHERE: +1.50
OD_BRAND: COOPERVISION MYDAY TORIC
OS_CYLINDER: -0.75
OS_AXIS: 010
OS_BASECURVE: 8.6
OS_DIAMETER: 14.5

## 2023-01-26 ASSESSMENT — KERATOMETRY
OD_K1POWER_DIOPTERS: 43.50
OS_K2POWER_DIOPTERS: 42.50
OD_AXISANGLE2_DEGREES: 177
OS_AXISANGLE_DEGREES: 90
OD_AXISANGLE_DEGREES: 87
OS_K1POWER_DIOPTERS: 41.50
OS_AXISANGLE2_DEGREES: 180
OD_K2POWER_DIOPTERS: 44.25

## 2023-01-26 ASSESSMENT — REFRACTION_MANIFEST
OD_SPHERE: -1.00
OS_SPHERE: +0.50
OD_AXIS: 124
OS_AXIS: 099
OS_CYLINDER: +0.25
OD_CYLINDER: +0.25
OS_AXIS: 100
OS_ADD: +1.75
OD_CYLINDER: SPHERE
OD_ADD: +1.75
OD_SPHERE: -1.00
OS_CYLINDER: +0.25
OS_SPHERE: +0.50
METHOD_AUTOREFRACTION: 1

## 2023-01-26 ASSESSMENT — CONF VISUAL FIELD
OD_NORMAL: 1
OS_INFERIOR_TEMPORAL_RESTRICTION: 0
OS_SUPERIOR_NASAL_RESTRICTION: 0
OS_NORMAL: 1
OD_SUPERIOR_NASAL_RESTRICTION: 0
OS_SUPERIOR_TEMPORAL_RESTRICTION: 0
OD_SUPERIOR_TEMPORAL_RESTRICTION: 0
OD_INFERIOR_TEMPORAL_RESTRICTION: 0
OS_INFERIOR_NASAL_RESTRICTION: 0
OD_INFERIOR_NASAL_RESTRICTION: 0

## 2023-01-26 ASSESSMENT — REFRACTION_WEARINGRX
OS_CYLINDER: +0.75
OS_SPHERE: +0.75
OS_AXIS: 100

## 2023-01-26 ASSESSMENT — TONOMETRY
OS_IOP_MMHG: 15
IOP_METHOD: APPLANATION
OD_IOP_MMHG: 15

## 2023-01-26 ASSESSMENT — CUP TO DISC RATIO
OS_RATIO: 0.2
OD_RATIO: 0.2

## 2023-01-26 ASSESSMENT — SLIT LAMP EXAM - LIDS
COMMENTS: NORMAL
COMMENTS: NORMAL

## 2023-01-26 ASSESSMENT — EXTERNAL EXAM - LEFT EYE: OS_EXAM: NORMAL

## 2023-01-26 NOTE — LETTER
1/26/2023         RE: Mireille Walls  [id number]  [place] [address]        Dear Colleague,    Thank you for referring your patient, Mireille Walls, to the [facility]. Please see a copy of my visit note below.    Chief Complaint   Patient presents with     Annual Eye Exam     Would like contacts - $75 fit fee ok        Previous contact lens wearer? Yes: Joseph  Comfort of contact lenses :good  Satisfied with current lenses: Yes        Last Eye Exam: 2022  Dilated Previously: Yes, side effects of dilation explained today    What are you currently using to see?  glasses and contacts    Distance Vision Acuity: Satisfied with vision    Near Vision Acuity: Satisfied with vision while reading and using computer unaided    Eye Comfort: a little dry  Do you use eye drops? 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       OBJECTIVE: See Ophthalmology exam    ASSESSMENT:    ICD-10-CM    1. Presbyopia - Both Eyes  H52.4       2. Corneal scar - Both Eyes  H17.9          PLAN:     Patient Instructions   LASIK scars are permanent corneal defects to the epithelium and can cause dry eyes. Artificial tears can help mitigate the symptoms.    Presbyopia is the diagnosis. Presbyopia is an age-related condition where the eye's crystalline lens doesn't change shape as easily as it once did.    Eyeglass prescription given.    The affects of the dilating drops last for 4- 6 hours.  You will be more sensitive to light and vision will be blurry up close.  Do not drive if you do not feel comfortable.  Mydriatic sunglasses were given if needed.    Recommend annual eye exams.    Nery Ramírez O.D.  [facility]   [address] [place] [address]    [phone]                          Again, thank you for allowing me to participate in the  care of your patient.        Sincerely,        Nery Ramírez OD

## 2023-01-26 NOTE — PATIENT INSTRUCTIONS
LASIK scars are permanent corneal defects to the epithelium and can cause dry eyes. Artificial tears can help mitigate the symptoms.    Presbyopia is the diagnosis. Presbyopia is an age-related condition where the eye's crystalline lens doesn't change shape as easily as it once did.    Eyeglass prescription given.    The affects of the dilating drops last for 4- 6 hours.  You will be more sensitive to light and vision will be blurry up close.  Do not drive if you do not feel comfortable.  Mydriatic sunglasses were given if needed.    Recommend annual eye exams.    Nery Ramírez O.D.  [facility]   [address] [place] [address]    [phone]

## 2023-01-26 NOTE — PROGRESS NOTES
Chief Complaint   Patient presents with     Annual Eye Exam     Would like contacts - $75 fit fee ok        Previous contact lens wearer? Yes: Joseph  Comfort of contact lenses :good  Satisfied with current lenses: Yes        Last Eye Exam: 2022  Dilated Previously: Yes, side effects of dilation explained today    What are you currently using to see?  glasses and contacts    Distance Vision Acuity: Satisfied with vision    Near Vision Acuity: Satisfied with vision while reading and using computer unaided    Eye Comfort: a little dry  Do you use eye drops? 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       OBJECTIVE: See Ophthalmology exam    ASSESSMENT:    ICD-10-CM    1. Presbyopia - Both Eyes  H52.4       2. Corneal scar - Both Eyes  H17.9          PLAN:     Patient Instructions   LASIK scars are permanent corneal defects to the epithelium and can cause dry eyes. Artificial tears can help mitigate the symptoms.    Presbyopia is the diagnosis. Presbyopia is an age-related condition where the eye's crystalline lens doesn't change shape as easily as it once did.    Eyeglass prescription given.    The affects of the dilating drops last for 4- 6 hours.  You will be more sensitive to light and vision will be blurry up close.  Do not drive if you do not feel comfortable.  Mydriatic sunglasses were given if needed.    Recommend annual eye exams.    Nery Ramírez O.D.  [address] [address]    [phone]

## 2023-02-17 DIAGNOSIS — L70.0 ACNE VULGARIS: ICD-10-CM

## 2023-02-17 NOTE — TELEPHONE ENCOUNTER
Pt called and stated that she made an apt w/ [facility]on[facility] and soonest available was June - pt is going to be out of the medication in the next couple days and is wondering if she can get a refill to tie her over until her appointment - please call pt once meds have been sent, Thanks!

## 2023-02-19 RX ORDER — SPIRONOLACTONE 50 MG/1
TABLET, FILM COATED ORAL
Qty: 270 TABLET | Refills: 0 | Status: SHIPPED | OUTPATIENT
Start: 2023-02-19 | End: 2023-02-21

## 2023-02-19 RX ORDER — SPIRONOLACTONE 50 MG/1
TABLET, FILM COATED ORAL
Qty: 270 TABLET | Refills: 0
Start: 2023-02-19

## 2023-02-19 NOTE — TELEPHONE ENCOUNTER
Spironolactone refill approved. Patient has follow up in a few days also.    Delia Echeverria DO PGY-4  Department of Dermatology  [facility]

## 2023-02-19 NOTE — TELEPHONE ENCOUNTER
spironolactone (ALDACTONE) 50 MG tablet  Last Written Prescription Date:  8/3/21  Last Fill Quantity: 270,   # refills: 3  Last Office Visit :8/12/21  Future Office visit:  2/21/23    Routing refill request to provider for review/approval because:  gap in med rf. Gap in appts. nv 2/21/23  Normal serum creatinine on file in past 12 months    Normal serum potassium on file in past 12 months    Normal serum sodium on file in past 12 months

## 2023-02-21 ENCOUNTER — VIRTUAL VISIT (OUTPATIENT)
Dept: DERMATOLOGY | Facility: CLINIC | Age: 48
End: 2023-02-21
Payer: COMMERCIAL

## 2023-02-21 DIAGNOSIS — H02.839 DERMATOCHALASIS, UNSPECIFIED LATERALITY: Primary | ICD-10-CM

## 2023-02-21 DIAGNOSIS — L70.0 ACNE VULGARIS: ICD-10-CM

## 2023-02-21 PROCEDURE — 99214 OFFICE O/P EST MOD 30 MIN: CPT | Mod: TEL | Performed by: DERMATOLOGY

## 2023-02-21 RX ORDER — SPIRONOLACTONE 50 MG/1
100 TABLET, FILM COATED ORAL DAILY
Qty: 180 TABLET | Refills: 3 | Status: SHIPPED | OUTPATIENT
Start: 2023-02-21 | End: 2024-03-07

## 2023-02-21 NOTE — LETTER
2/21/2023       RE: Mireille Walls  [id number]  [place] [address]     Dear Colleague,    Thank you for referring your patient, Mireille Walls, to the [facility] [place] at [facility]. Please see a copy of my visit note below.    [facility] Dermatology Note  Encounter Date: Feb 21, 2023  Store-and-Forward and Telephone ([phone]). Location of teledermatologist: [facility] [place].  Start time: 9:08 AM. End time: 9:21 AM.    Dermatology Problem List:  1. Seborrheic dermatitis: Ketoconazole shampoo 3 times weekly  2. Nummular dermatitis of the central forehead with a negative KOH scraping on July 11, 2019: Hydrocortisone 2.5% cream twice daily for 2 to 3 weeks  3. Acne vulgaris, hormonal.  - Spironolactone 100 mg every day, started 1/30/20, increased to 150-200 mg 4/23/2020, current 100mg  - Consider isotretinoin in future if worsening  4. Allergic contact dermatitis  Current tx: doxycycline (7d course), TAC 0.1%    ____________________________________________    Assessment & Plan:     # Acne vulgaris. Chronic, stable.   - Reviewed labs: creatinine, K+ from 8/21  - Continue spironolactone 100 mg PO qam    # Dermatochalasis, bilateral lower eyelids. Chronic, flare/not at goal.   - Referral to ophtho to discuss blepharoplasty.   - Also discuss filler, botox, topicals.     Procedures Performed:    None    Follow-up: 4 month(s) in-person, or earlier for new or changing lesions    Staff:     Rashad Goodrich MD  Pronouns: he/him/his    Department of Dermatology  [facility]: Phone: [phone], Fax:[phone]  [facility]: Phone: [phone] Fax: [phone]  ____________________________________________    CC: Follow Up (Medication check and refill )    HPI:  Ms.  Mireille Walls is a(n) 48 year old female who presents today as a return patient for acne vulgaris. She has been spironolactone 100 mg PO qdaily for a few years. She states has been controlling acne well with only intermittent 1-2 spots per month around her menstrual cycle. She has been tolerating well without side effects taking 100 mg PO qAM. Not using topical therapies.     Of note, patient also reports puffy lower eyelids/redundant skin in the area and would like to discuss options for treatment.     Patient is otherwise feeling well, without additional skin concerns.    Labs Reviewed:  N/A    Physical Exam:  Vitals: There were no vitals taken for this visit.  SKIN: Teledermatology photos were reviewed; image quality and interpretability: acceptable. Image date: 2/21/23.  - Few pink macules on bilateral cheeks and chin c/w post-inflammatory changes.   - No other lesions of concern on areas examined.     Fe            Medications:  Current Outpatient Medications   Medication     buPROPion (WELLBUTRIN XL) 150 MG 24 hr tablet     CALCIUM PO     Cholecalciferol (D3 VITAMIN PO)     COD LIVER OIL PO     ketoconazole (NIZORAL) 2 % external shampoo     loratadine (CLARITIN) 10 MG tablet     Multiple Vitamins-Minerals (MULTIPLE VITAMINS/WOMENS PO)     Probiotic Product (PROBIOTIC PO)     rizatriptan (MAXALT-MLT) 5 MG ODT     spironolactone (ALDACTONE) 50 MG tablet     vortioxetine (TRINTELLIX) 10 MG tablet     No current facility-administered medications for this visit.      Past Medical/Surgical History:   Patient Active Problem List   Diagnosis     Depression     OCD (obsessive compulsive disorder)     IBS (irritable bowel syndrome)     Migraines     Hyperlipidemia     Contraception -- abstinence     Plantar fascial fibromatosis     Acne     Hair loss     Anxiety associated with depression     ALIVIA (obstructive sleep apnea)     Essential hypertension     Factor 5 Leiden mutation, heterozygous (H)     Past Medical History:    Diagnosis Date     Abnormal Pap smear 2006?    dysplasia  X 1; paps OK since then...     Arm DVT (deep venous thromboembolism), acute (H) 2008    hx with phlebitis in IVs after a surgery     Breast disorder 2016    2D mammo, then 3D & u/s to r/o = scar tissue from reduction     Cholesterol serum elevated     runs in family     Complication of anesthesia 2000?, 2016    very slow to awake from both gen. anesth. & conscious sedati     Depression      Encounter for insertion of mirena IUD 2011    D/C'd w increased acne     Hypertension 2017&2018    jamin. during exercise, caused by stimulant for depression?     IBS (irritable bowel syndrome) 2002    under control, better with probiotic     Menarche age 15    cycles q 1-2 months Xs 3-4 d w bad cramps     Migraines 2008    a lot better now, may be stress related     OCD (obsessive compulsive disorder)      Seasonal allergies            Again, thank you for allowing me to participate in the care of your patient.      Sincerely,    Rashad Goodrich MD

## 2023-02-21 NOTE — LETTER
Date:February 22, 2023      Provider requested that no letter be sent. Do not send.       [facility]

## 2023-02-21 NOTE — NURSING NOTE
Chief Complaint   Patient presents with     Follow Up     Medication check and refill      Teledermatology Nurse Call Patients:     Are you in the [facility] at the time of the encounter? yes    Today's visit will be billed to you and your insurance.    A teledermatology visit is not as thorough as an in-person visit and the quality of the photograph sent may not be of the same quality as that taken by the dermatology clinic.

## 2023-02-21 NOTE — PROGRESS NOTES
[facility] Dermatology Note  Encounter Date: Feb 21, 2023  Store-and-Forward and Telephone ([phone]). Location of teledermatologist: [facility] [place].  Start time: 9:08 AM. End time: 9:21 AM.    Dermatology Problem List:  1. Seborrheic dermatitis: Ketoconazole shampoo 3 times weekly  2. Nummular dermatitis of the central forehead with a negative KOH scraping on July 11, 2019: Hydrocortisone 2.5% cream twice daily for 2 to 3 weeks  3. Acne vulgaris, hormonal.  - Spironolactone 100 mg every day, started 1/30/20, increased to 150-200 mg 4/23/2020, current 100mg  - Consider isotretinoin in future if worsening  4. Allergic contact dermatitis  Current tx: doxycycline (7d course), TAC 0.1%    ____________________________________________    Assessment & Plan:     # Acne vulgaris. Chronic, stable.   - Reviewed labs: creatinine, K+ from 8/21  - Continue spironolactone 100 mg PO qam    # Dermatochalasis, bilateral lower eyelids. Chronic, flare/not at goal.   - Referral to ophtho to discuss blepharoplasty.   - Also discuss filler, botox, topicals.     Procedures Performed:    None    Follow-up: 4 month(s) in-person, or earlier for new or changing lesions    Staff:     Rashad Goodrich MD  Pronouns: he/him/his    Department of Dermatology  [facility]: Phone: [phone], Fax:[phone]  [facility]: Phone: [phone] Fax: [phone]  ____________________________________________    CC: Follow Up (Medication check and refill )    HPI:  Ms. Mireille Walls is a(n) 48 year old female who presents today as a return patient for acne vulgaris. She has been spironolactone 100 mg PO qdaily for a few years. She states has been controlling acne well with only intermittent 1-2 spots per month around her menstrual cycle. She has been tolerating well without  side effects taking 100 mg PO qAM. Not using topical therapies.     Of note, patient also reports puffy lower eyelids/redundant skin in the area and would like to discuss options for treatment.     Patient is otherwise feeling well, without additional skin concerns.    Labs Reviewed:  N/A    Physical Exam:  Vitals: There were no vitals taken for this visit.  SKIN: Teledermatology photos were reviewed; image quality and interpretability: acceptable. Image date: 2/21/23.  - Few pink macules on bilateral cheeks and chin c/w post-inflammatory changes.   - No other lesions of concern on areas examined.     Fe            Medications:  Current Outpatient Medications   Medication     buPROPion (WELLBUTRIN XL) 150 MG 24 hr tablet     CALCIUM PO     Cholecalciferol (D3 VITAMIN PO)     COD LIVER OIL PO     ketoconazole (NIZORAL) 2 % external shampoo     loratadine (CLARITIN) 10 MG tablet     Multiple Vitamins-Minerals (MULTIPLE VITAMINS/WOMENS PO)     Probiotic Product (PROBIOTIC PO)     rizatriptan (MAXALT-MLT) 5 MG ODT     spironolactone (ALDACTONE) 50 MG tablet     vortioxetine (TRINTELLIX) 10 MG tablet     No current facility-administered medications for this visit.      Past Medical/Surgical History:   Patient Active Problem List   Diagnosis     Depression     OCD (obsessive compulsive disorder)     IBS (irritable bowel syndrome)     Migraines     Hyperlipidemia     Contraception -- abstinence     Plantar fascial fibromatosis     Acne     Hair loss     Anxiety associated with depression     ALIVIA (obstructive sleep apnea)     Essential hypertension     Factor 5 Leiden mutation, heterozygous (H)     Past Medical History:   Diagnosis Date     Abnormal Pap smear 2006?    dysplasia  X 1; paps OK since then...     Arm DVT (deep venous thromboembolism), acute (H) 2008    hx with phlebitis in IVs after a surgery     Breast disorder 2016    2D mammo, then 3D & u/s to r/o = scar tissue from reduction     Cholesterol serum elevated      runs in family     Complication of anesthesia 2000?, 2016    very slow to awake from both gen. anesth. & conscious sedati     Depression      Encounter for insertion of mirena IUD 2011    D/C'd w increased acne     Hypertension 2017&2018    jamin. during exercise, caused by stimulant for depression?     IBS (irritable bowel syndrome) 2002    under control, better with probiotic     Menarche age 15    cycles q 1-2 months Xs 3-4 d w bad cramps     Migraines 2008    a lot better now, may be stress related     OCD (obsessive compulsive disorder)      Seasonal allergies

## 2023-04-02 ENCOUNTER — HEALTH MAINTENANCE LETTER (OUTPATIENT)
Age: 48
End: 2023-04-02

## 2023-05-31 ENCOUNTER — ANCILLARY PROCEDURE (OUTPATIENT)
Dept: MAMMOGRAPHY | Facility: CLINIC | Age: 48
End: 2023-05-31
Attending: OBSTETRICS & GYNECOLOGY
Payer: COMMERCIAL

## 2023-05-31 DIAGNOSIS — Z12.31 VISIT FOR SCREENING MAMMOGRAM: ICD-10-CM

## 2023-05-31 PROCEDURE — 77067 SCR MAMMO BI INCL CAD: CPT | Mod: GC | Performed by: RADIOLOGY

## 2023-05-31 PROCEDURE — 77063 BREAST TOMOSYNTHESIS BI: CPT | Mod: GC | Performed by: RADIOLOGY

## 2023-06-09 ENCOUNTER — OFFICE VISIT (OUTPATIENT)
Dept: DERMATOLOGY | Facility: CLINIC | Age: 48
End: 2023-06-09
Payer: COMMERCIAL

## 2023-06-09 DIAGNOSIS — L81.4 LENTIGINES: ICD-10-CM

## 2023-06-09 DIAGNOSIS — L21.9 SEBORRHEIC DERMATITIS: ICD-10-CM

## 2023-06-09 DIAGNOSIS — L30.9 DERMATITIS: ICD-10-CM

## 2023-06-09 DIAGNOSIS — L82.1 SEBORRHEIC KERATOSIS: ICD-10-CM

## 2023-06-09 DIAGNOSIS — D22.9 MULTIPLE BENIGN NEVI: ICD-10-CM

## 2023-06-09 DIAGNOSIS — L70.0 ACNE VULGARIS: Primary | ICD-10-CM

## 2023-06-09 DIAGNOSIS — D18.01 CHERRY ANGIOMA: ICD-10-CM

## 2023-06-09 DIAGNOSIS — D23.9 DERMATOFIBROMA: ICD-10-CM

## 2023-06-09 DIAGNOSIS — H02.839 DERMATOCHALASIS, UNSPECIFIED LATERALITY: ICD-10-CM

## 2023-06-09 PROCEDURE — 99214 OFFICE O/P EST MOD 30 MIN: CPT | Mod: GC | Performed by: DERMATOLOGY

## 2023-06-09 NOTE — PROGRESS NOTES
[facility] Dermatology Note  Encounter Date: Jun 9, 2023  Office Visit     Dermatology Problem List:  1. Seborrheic dermatitis: Ketoconazole shampoo 3 times weekly  2. Nummular dermatitis of the central forehead with a negative KOH scraping on July 11, 2019: Hydrocortisone 2.5% cream twice daily for 2 to 3 weeks  3. Acne vulgaris, hormonal.  - Spironolactone 100 mg every day, started 1/30/20, increased to 150-200 mg 4/23/2020, current 100mg  - Consider isotretinoin in future if worsening  4. Allergic contact dermatitis  - Patch referral 6/9/23  - Prior rx: [facility] 0.1%    ____________________________________________    Assessment & Plan:     # Dermatitis - concern for ACD  # Nummular dermatitis history  Patient showed pictures of diffuse maculopapular rash present on his face/hands after sleeping at brother's house on an air mattress. Only occurs when sleeping on the air mattress, no reactions/reactivations since.    - Allergy referral for testing placed   - Continue to use previous prescription of hydrocortisone cream or [facility] BID PRN if flare occurs    # Acne vulgaris. Chronic, stable.   - Continue spironolactone 100 mg PO qam  - has been stable on this dose since 2020; last potassium wnl 8/2021    # Dermatochalasis, bilateral lower eyelids.   - Referral to ophtho to discuss blepharoplasty already placed by Dr. Goodrich    # Seborrheic dermatitis, stable   - Continue Ketoconazole shampoo 3 times weekly    # Multiple benign nevi.   - Continue to monitor nevus on R breast, photo today  - Monitor for ABCDEs of melanoma   - Continue sun protection - recommend SPF 30 or higher with frequent application   - Return sooner if noticing changing or symptomatic lesions    # Benign lesions - SKs, cherry angiomas, lentigenes, dermatofibromas.  - No treatment required      Procedures Performed:   None    Follow-up: Follow-up in 1 year for repeat skin exam.     Assessment and plan discussed with Dr. Mg.    Joel  MD Ella   Internal Medicine Resident PGY-2  [phone]  [facility]    Staff Physician Comments:   I saw and evaluated the patient with the resident and I agree with the assessment and plan.  I was present for the examination.    Veronique Mg MD    Department of Dermatology  [facility]: Phone: [phone], Fax: [phone]  6/12/2023     ____________________________________________    CC: Derm Problem (Mireille is here today for skin check. Has concerning lesions on chest, R forearm, and R lower leg. Denies symptoms with these areas.)    HPI:  Ms. Mireille Walls is a(n) 48 year old female who presents today as a return patient for skin exam. She was last seen by telephone on 2/21/23 with Dr. Goodrich and discussed using spironolactone for known acne vulgaris. Since that phone visit, patient has been doing well. No acne flares while on spironolactone. Patient notes lesions of the right forearm, right calf, and two lesions on the chest to be evaluated today.     Patient is otherwise feeling well, without additional skin concerns.     Labs Reviewed:  N/A    Physical Exam:  Vitals: There were no vitals taken for this visit.  SKIN: Total skin excluding the undergarment areas was performed. The exam included the head/face, neck, both arms, chest, back, abdomen, both legs, digits and/or nails.   - There are dome shaped bright red papules on the chest, abdomen, back and bilateral arms/legs.   - Multiple regular brown pigmented macules and papules are identified on the chest, abdomen, back and bilateral arms/legs.   - Scattered brown macules on sun exposed areas..   - There are waxy stuck on tan to brown papules on the chest, abdomen, back and bilateral arms/legs   - photo of eczematous plaque with swelling on left forearm  - No other lesions of concern on areas examined.      Medications:  Current Outpatient Medications   Medication     buPROPion (WELLBUTRIN XL) 150 MG 24 hr tablet     CALCIUM PO     Cholecalciferol (D3 VITAMIN PO)     COD LIVER OIL PO     ketoconazole (NIZORAL) 2 % external shampoo     loratadine (CLARITIN) 10 MG tablet     Multiple Vitamins-Minerals (MULTIPLE VITAMINS/WOMENS PO)     Probiotic Product (PROBIOTIC PO)     rizatriptan (MAXALT-MLT) 5 MG ODT     spironolactone (ALDACTONE) 50 MG tablet     vortioxetine (TRINTELLIX) 10 MG tablet     No current facility-administered medications for this visit.      Past Medical History:   Patient Active Problem List   Diagnosis     Depression     OCD (obsessive compulsive disorder)     IBS (irritable bowel syndrome)     Migraines     Hyperlipidemia     Contraception -- abstinence     Plantar fascial fibromatosis     Acne     Hair loss     Anxiety associated with depression     ALIVIA (obstructive sleep apnea)     Essential hypertension     Factor 5 Leiden mutation, heterozygous (H)     Past Medical History:   Diagnosis Date     Abnormal Pap smear 2006?    dysplasia  X 1; paps OK since then...     Arm DVT (deep venous thromboembolism), acute (H) 2008    hx with phlebitis in IVs after a surgery     Breast disorder 2016    2D mammo, then 3D & u/s to r/o = scar tissue from reduction     Cholesterol serum elevated     runs in family     Complication of anesthesia 2000?, 2016    very slow to awake from both gen. anesth. & conscious sedati     Depression      Encounter for insertion of mirena IUD 2011    D/C'd w increased acne     Hypertension 2017&2018    jamin. during exercise, caused by stimulant for depression?     IBS (irritable bowel syndrome) 2002    under control, better with probiotic     Menarche age 15    cycles q 1-2 months Xs 3-4 d w bad cramps     Migraines 2008    a lot better now, may be stress related     OCD (obsessive compulsive disorder)      Seasonal allergies        CC No referring provider defined for this  encounter. on close of this encounter.

## 2023-06-09 NOTE — NURSING NOTE
Dermatology Rooming Note    Mireille Walls's goals for this visit include:   Chief Complaint   Patient presents with     Derm Problem     Mireille is here today for skin check. Has concerning lesions on chest, R forearm, and R lower leg. Denies symptoms with these areas.

## 2023-06-09 NOTE — LETTER
6/9/2023       RE: Mireille Walls  [address]  [place] [address]     Dear Colleague,    Thank you for referring your patient, Mireille Walls, to the [facility] [place] at [facility]. Please see a copy of my visit note below.    [facility] Dermatology Note  Encounter Date: Jun 9, 2023  Office Visit     Dermatology Problem List:  1. Seborrheic dermatitis: Ketoconazole shampoo 3 times weekly  2. Nummular dermatitis of the central forehead with a negative KOH scraping on July 11, 2019: Hydrocortisone 2.5% cream twice daily for 2 to 3 weeks  3. Acne vulgaris, hormonal.  - Spironolactone 100 mg every day, started 1/30/20, increased to 150-200 mg 4/23/2020, current 100mg  - Consider isotretinoin in future if worsening  4. Allergic contact dermatitis  - Patch referral 6/9/23  - Prior rx: [facility] 0.1%    ____________________________________________    Assessment & Plan:     # Dermatitis - concern for ACD  # Nummular dermatitis history  Patient showed pictures of diffuse maculopapular rash present on his face/hands after sleeping at brother's house on an air mattress. Only occurs when sleeping on the air mattress, no reactions/reactivations since.    - Allergy referral for testing placed   - Continue to use previous prescription of hydrocortisone cream or [facility] BID PRN if flare occurs    # Acne vulgaris. Chronic, stable.   - Continue spironolactone 100 mg PO qam  - has been stable on this dose since 2020; last potassium wnl 8/2021    # Dermatochalasis, bilateral lower eyelids.   - Referral to ophtho to discuss blepharoplasty already placed by Dr. Goodrich    # Seborrheic dermatitis, stable   - Continue Ketoconazole shampoo 3 times weekly    # Multiple benign nevi.   - Continue to monitor nevus on R breast, photo today  - Monitor for ABCDEs of melanoma   - Continue sun protection - recommend SPF 30 or higher with frequent application    - Return sooner if noticing changing or symptomatic lesions    # Benign lesions - SKs, cherry angiomas, lentigenes, dermatofibromas.  - No treatment required      Procedures Performed:   None    Follow-up: Follow-up in 1 year for repeat skin exam.     Assessment and plan discussed with Dr. Mg.    Joel Jaramillo MD   Internal Medicine Resident PGY-2  [phone]  [facility]    Staff Physician Comments:   I saw and evaluated the patient with the resident and I agree with the assessment and plan.  I was present for the examination.    Veronique Mg MD    Department of Dermatology  [facility]: Phone: [phone], Fax: [phone]  6/12/2023     ____________________________________________    CC: Derm Problem (Mireille is here today for skin check. Has concerning lesions on chest, R forearm, and R lower leg. Denies symptoms with these areas.)    HPI:  Ms. Mireille Walls is a(n) 48 year old female who presents today as a return patient for skin exam. She was last seen by telephone on 2/21/23 with Dr. Goodrich and discussed using spironolactone for known acne vulgaris. Since that phone visit, patient has been doing well. No acne flares while on spironolactone. Patient notes lesions of the right forearm, right calf, and two lesions on the chest to be evaluated today.     Patient is otherwise feeling well, without additional skin concerns.     Labs Reviewed:  N/A    Physical Exam:  Vitals: There were no vitals taken for this visit.  SKIN: Total skin excluding the undergarment areas was performed. The exam included the head/face, neck, both arms, chest, back, abdomen, both legs, digits and/or nails.   - There are dome shaped bright red papules on the chest, abdomen, back and bilateral arms/legs.   - Multiple regular brown pigmented macules and papules are identified on the chest,  abdomen, back and bilateral arms/legs.   - Scattered brown macules on sun exposed areas..   - There are waxy stuck on tan to brown papules on the chest, abdomen, back and bilateral arms/legs   - photo of eczematous plaque with swelling on left forearm  - No other lesions of concern on areas examined.     Medications:  Current Outpatient Medications   Medication    buPROPion (WELLBUTRIN XL) 150 MG 24 hr tablet    CALCIUM PO    Cholecalciferol (D3 VITAMIN PO)    COD LIVER OIL PO    ketoconazole (NIZORAL) 2 % external shampoo    loratadine (CLARITIN) 10 MG tablet    Multiple Vitamins-Minerals (MULTIPLE VITAMINS/WOMENS PO)    Probiotic Product (PROBIOTIC PO)    rizatriptan (MAXALT-MLT) 5 MG ODT    spironolactone (ALDACTONE) 50 MG tablet    vortioxetine (TRINTELLIX) 10 MG tablet     No current facility-administered medications for this visit.      Past Medical History:   Patient Active Problem List   Diagnosis    Depression    OCD (obsessive compulsive disorder)    IBS (irritable bowel syndrome)    Migraines    Hyperlipidemia    Contraception -- abstinence    Plantar fascial fibromatosis    Acne    Hair loss    Anxiety associated with depression    ALIVIA (obstructive sleep apnea)    Essential hypertension    Factor 5 Leiden mutation, heterozygous (H)     Past Medical History:   Diagnosis Date    Abnormal Pap smear 2006?    dysplasia  X 1; paps OK since then...    Arm DVT (deep venous thromboembolism), acute (H) 2008    hx with phlebitis in IVs after a surgery    Breast disorder 2016    2D mammo, then 3D & u/s to r/o = scar tissue from reduction    Cholesterol serum elevated     runs in family    Complication of anesthesia 2000?, 2016    very slow to awake from both gen. anesth. & conscious sedati    Depression     Encounter for insertion of mirena IUD 2011    D/C'd w increased acne    Hypertension 2017&2018    jamin. during exercise, caused by stimulant for depression?    IBS (irritable bowel syndrome) 2002    under control,  better with probiotic    Menarche age 15    cycles q 1-2 months Xs 3-4 d w bad cramps    Migraines 2008    a lot better now, may be stress related    OCD (obsessive compulsive disorder)     Seasonal allergies        CC No referring provider defined for this encounter. on close of this encounter.

## 2023-06-13 NOTE — PATIENT INSTRUCTIONS
Patient Education     Checking for Skin Cancer  You can find cancer early by checking your skin each month. There are 3 kinds of skin cancer. They are melanoma, basal cell carcinoma, and squamous cell carcinoma. Doing monthly skin checks is the best way to find new marks or skin changes. Follow the instructions below for checking your skin.   The ABCDEs of checking moles for melanoma   Check your moles or growths for signs of melanoma using ABCDE:   Asymmetry: the sides of the mole or growth don t match  Border: the edges are ragged, notched, or blurred  Color: the color within the mole or growth varies  Diameter: the mole or growth is larger than 6 mm (size of a pencil eraser)  Evolving: the size, shape, or color of the mole or growth is changing (evolving is not shown in the images below)    Checking for other types of skin cancer  Basal cell carcinoma or squamous cell carcinoma have symptoms such as:     A spot or mole that looks different from all other marks on your skin  Changes in how an area feels, such as itching, tenderness, or pain  Changes in the skin's surface, such as oozing, bleeding, or scaliness  A sore that does not heal  New swelling or redness beyond the border of a mole    Who s at risk?  Anyone can get skin cancer. But you are at greater risk if you have:   Fair skin, light-colored hair, or light-colored eyes  Many moles or abnormal moles on your skin  A history of sunburns from sunlight or tanning beds  A family history of skin cancer  A history of exposure to radiation or chemicals  A weakened immune system  If you have had skin cancer in the past, you are at risk for recurring skin cancer.   How to check your skin  Do your monthly skin checkups in front of a full-length mirror. Check all parts of your body, including your:   Head (ears, face, neck, and scalp)  Torso (front, back, and sides)  Arms (tops, undersides, upper, and lower armpits)  Hands (palms, backs, and fingers, including  under the nails)  Buttocks and genitals  Legs (front, back, and sides)  Feet (tops, soles, toes, including under the nails, and between toes)  If you have a lot of moles, take digital photos of them each month. Make sure to take photos both up close and from a distance. These can help you see if any moles change over time.   Most skin changes are not cancer. But if you see any changes in your skin, call your doctor right away. Only he or she can diagnose a problem. If you have skin cancer, seeing your doctor can be the first step toward getting the treatment that could save your life.   Judys Book last reviewed this educational content on 4/1/2019 2000-2020 [employer]. [address] [place] [address], [place] [address]. All rights reserved. This information is not intended as a substitute for professional medical care. Always follow your healthcare professional's instructions.       When should I call my doctor?  If you are worsening or not improving, please, contact us or seek urgent care as noted below.     Who should I call with questions (adults)?  [facility] (adult and pediatric): [phone]  [facility] (adult): [phone]  For urgent needs outside of business hours call the [facility] at [phone] and ask for the dermatology resident on call to be paged  If this is a medical emergency and you are unable to reach an ER, Call [phone]    Who should I call with questions (pediatric)?  [facility]- Pediatric Dermatology  Dr. Rajni Man, Dr. Nicola Magallanes, Dr. Asmita Vail, JUSTINA Torrez, Dr. Michelle Yen, Dr. Rasheeda Bernard & Dr. Rory Maradiaga  Non-urgent nurse triage line; [phone]- Jami and Danelle LAZARO Care Coordinatorrebeca Weaver (/Complex ) [phone]    If you need a prescription refill, please contact your pharmacy. Refills are approved or denied by our  Physicians during normal business hours, Monday through Fridays  Per office policy, refills will not be granted if you have not been seen within the past year (or sooner depending on your child's condition)    Scheduling Information:  Pediatric Appointment Scheduling and Call Center [phone]  Radiology Scheduling- [phone]  Sedation Unit Scheduling- [phone]  [place] Scheduling- General [phone]; Pediatric Dermatology [phone]  Main  Services: [phone]  Sami: [phone]  Estonian: [phone]  Hmong/Mongolian/Greek: [phone]  Preadmission Nursing Department Fax Number: [phone] (Fax all pre-operative paperwork to this number)    For urgent matters arising during evenings, weekends, or holidays that cannot wait for normal business hours please call [phone] and ask for the dermatology resident on call to be paged.

## 2023-06-27 ENCOUNTER — TELEPHONE (OUTPATIENT)
Dept: FAMILY MEDICINE | Facility: CLINIC | Age: 48
End: 2023-06-27
Payer: COMMERCIAL

## 2023-06-27 NOTE — TELEPHONE ENCOUNTER
LVM at 3:20pm - Need to reschedule 7/3 appointment as provider will be out of clinic. Schedule with Dr. Murdock on 7/3 or ok to double book with Dr. Gomez this week

## 2023-07-25 ENCOUNTER — OFFICE VISIT (OUTPATIENT)
Dept: FAMILY MEDICINE | Facility: CLINIC | Age: 48
End: 2023-07-25
Payer: COMMERCIAL

## 2023-07-25 VITALS
WEIGHT: 182.6 LBS | TEMPERATURE: 98.5 F | SYSTOLIC BLOOD PRESSURE: 116 MMHG | HEIGHT: 64 IN | RESPIRATION RATE: 12 BRPM | DIASTOLIC BLOOD PRESSURE: 80 MMHG | HEART RATE: 72 BPM | OXYGEN SATURATION: 98 % | BODY MASS INDEX: 31.18 KG/M2

## 2023-07-25 DIAGNOSIS — D68.51 FACTOR 5 LEIDEN MUTATION, HETEROZYGOUS (H): Chronic | ICD-10-CM

## 2023-07-25 DIAGNOSIS — G47.33 OSA (OBSTRUCTIVE SLEEP APNEA): Chronic | ICD-10-CM

## 2023-07-25 DIAGNOSIS — G43.709 CHRONIC MIGRAINE WITHOUT AURA WITHOUT STATUS MIGRAINOSUS, NOT INTRACTABLE: Chronic | ICD-10-CM

## 2023-07-25 DIAGNOSIS — N95.1 PERIMENOPAUSAL: ICD-10-CM

## 2023-07-25 DIAGNOSIS — Z12.11 SCREEN FOR COLON CANCER: ICD-10-CM

## 2023-07-25 DIAGNOSIS — R79.89 ELEVATED TSH: ICD-10-CM

## 2023-07-25 DIAGNOSIS — E78.2 MIXED HYPERLIPIDEMIA: Primary | Chronic | ICD-10-CM

## 2023-07-25 DIAGNOSIS — G47.10 EXCESSIVE SLEEPINESS: ICD-10-CM

## 2023-07-25 DIAGNOSIS — Z12.4 CERVICAL CANCER SCREENING: ICD-10-CM

## 2023-07-25 DIAGNOSIS — E66.811 CLASS 1 OBESITY DUE TO EXCESS CALORIES WITH SERIOUS COMORBIDITY AND BODY MASS INDEX (BMI) OF 31.0 TO 31.9 IN ADULT: Chronic | ICD-10-CM

## 2023-07-25 DIAGNOSIS — E66.9 OBESITY (BMI 30-39.9): ICD-10-CM

## 2023-07-25 DIAGNOSIS — R53.82 CHRONIC FATIGUE: ICD-10-CM

## 2023-07-25 DIAGNOSIS — E66.09 CLASS 1 OBESITY DUE TO EXCESS CALORIES WITH SERIOUS COMORBIDITY AND BODY MASS INDEX (BMI) OF 31.0 TO 31.9 IN ADULT: Chronic | ICD-10-CM

## 2023-07-25 DIAGNOSIS — D72.829 LEUKOCYTOSIS, UNSPECIFIED TYPE: ICD-10-CM

## 2023-07-25 LAB
ALBUMIN SERPL BCG-MCNC: 4.4 G/DL (ref 3.5–5.2)
ALP SERPL-CCNC: 69 U/L (ref 35–104)
ALT SERPL W P-5'-P-CCNC: 16 U/L (ref 0–50)
ANION GAP SERPL CALCULATED.3IONS-SCNC: 14 MMOL/L (ref 7–15)
AST SERPL W P-5'-P-CCNC: 23 U/L (ref 0–45)
BASOPHILS # BLD AUTO: 0.1 10E3/UL (ref 0–0.2)
BASOPHILS NFR BLD AUTO: 1 %
BILIRUB SERPL-MCNC: 0.3 MG/DL
BUN SERPL-MCNC: 18.2 MG/DL (ref 6–20)
CALCIUM SERPL-MCNC: 9.4 MG/DL (ref 8.6–10)
CHLORIDE SERPL-SCNC: 104 MMOL/L (ref 98–107)
CHOLEST SERPL-MCNC: 230 MG/DL
CREAT SERPL-MCNC: 0.67 MG/DL (ref 0.51–0.95)
DEPRECATED HCO3 PLAS-SCNC: 22 MMOL/L (ref 22–29)
EOSINOPHIL # BLD AUTO: 0.3 10E3/UL (ref 0–0.7)
EOSINOPHIL NFR BLD AUTO: 2 %
ERYTHROCYTE [DISTWIDTH] IN BLOOD BY AUTOMATED COUNT: 12.2 % (ref 10–15)
FSH SERPL IRP2-ACNC: 11.2 MIU/ML
GFR SERPL CREATININE-BSD FRML MDRD: >90 ML/MIN/1.73M2
GLUCOSE SERPL-MCNC: 89 MG/DL (ref 70–99)
HCT VFR BLD AUTO: 39.5 % (ref 35–47)
HDLC SERPL-MCNC: 49 MG/DL
HGB BLD-MCNC: 13.4 G/DL (ref 11.7–15.7)
IMM GRANULOCYTES # BLD: 0 10E3/UL
IMM GRANULOCYTES NFR BLD: 0 %
LDLC SERPL CALC-MCNC: 136 MG/DL
LYMPHOCYTES # BLD AUTO: 3.4 10E3/UL (ref 0.8–5.3)
LYMPHOCYTES NFR BLD AUTO: 30 %
MCH RBC QN AUTO: 31.9 PG (ref 26.5–33)
MCHC RBC AUTO-ENTMCNC: 33.9 G/DL (ref 31.5–36.5)
MCV RBC AUTO: 94 FL (ref 78–100)
MONOCYTES # BLD AUTO: 0.7 10E3/UL (ref 0–1.3)
MONOCYTES NFR BLD AUTO: 6 %
NEUTROPHILS # BLD AUTO: 6.8 10E3/UL (ref 1.6–8.3)
NEUTROPHILS NFR BLD AUTO: 61 %
NONHDLC SERPL-MCNC: 181 MG/DL
PLATELET # BLD AUTO: 319 10E3/UL (ref 150–450)
POTASSIUM SERPL-SCNC: 4.4 MMOL/L (ref 3.4–5.3)
PROT SERPL-MCNC: 6.9 G/DL (ref 6.4–8.3)
RBC # BLD AUTO: 4.2 10E6/UL (ref 3.8–5.2)
SODIUM SERPL-SCNC: 140 MMOL/L (ref 136–145)
T4 FREE SERPL-MCNC: 1.15 NG/DL (ref 0.9–1.7)
TRIGL SERPL-MCNC: 226 MG/DL
TSH SERPL DL<=0.005 MIU/L-ACNC: 4.76 UIU/ML (ref 0.3–4.2)
VIT B12 SERPL-MCNC: 912 PG/ML (ref 232–1245)
WBC # BLD AUTO: 11.1 10E3/UL (ref 4–11)
WBC # BLD AUTO: 11.3 10E3/UL (ref 4–11)

## 2023-07-25 PROCEDURE — 86376 MICROSOMAL ANTIBODY EACH: CPT | Performed by: FAMILY MEDICINE

## 2023-07-25 PROCEDURE — 84480 ASSAY TRIIODOTHYRONINE (T3): CPT | Performed by: FAMILY MEDICINE

## 2023-07-25 PROCEDURE — 83001 ASSAY OF GONADOTROPIN (FSH): CPT | Performed by: FAMILY MEDICINE

## 2023-07-25 PROCEDURE — 85025 COMPLETE CBC W/AUTO DIFF WBC: CPT | Performed by: FAMILY MEDICINE

## 2023-07-25 PROCEDURE — 36415 COLL VENOUS BLD VENIPUNCTURE: CPT | Performed by: FAMILY MEDICINE

## 2023-07-25 PROCEDURE — 82306 VITAMIN D 25 HYDROXY: CPT | Performed by: FAMILY MEDICINE

## 2023-07-25 PROCEDURE — 80053 COMPREHEN METABOLIC PANEL: CPT | Performed by: FAMILY MEDICINE

## 2023-07-25 PROCEDURE — 80061 LIPID PANEL: CPT | Performed by: FAMILY MEDICINE

## 2023-07-25 PROCEDURE — 99204 OFFICE O/P NEW MOD 45 MIN: CPT | Performed by: FAMILY MEDICINE

## 2023-07-25 PROCEDURE — 82607 VITAMIN B-12: CPT | Performed by: FAMILY MEDICINE

## 2023-07-25 PROCEDURE — 84443 ASSAY THYROID STIM HORMONE: CPT | Performed by: FAMILY MEDICINE

## 2023-07-25 PROCEDURE — 84439 ASSAY OF FREE THYROXINE: CPT | Performed by: FAMILY MEDICINE

## 2023-07-25 NOTE — PROGRESS NOTES
Assessment & Plan     Mixed hyperlipidemia  Will f/u on results and call with recommendations.    - Lipid panel reflex to direct LDL Fasting; Future    Chronic migraine without aura without status migrainosus, not intractable  Stable, continue to avoid potential triggers for migraine monitor,    Factor 5 Leiden mutation, heterozygous (H)  Discussed about preventive measures against thromboembolism including keeping well-hydrated, using compression stockings and taking breaks during long distance car rides    ALIVIA (obstructive sleep apnea)  With ongoing excessive sleepiness, chronic fatigue and snoring, will refer to sleep physician for further evaluation  Had a test done few years ago that showed mild sleep apnea  - Adult Sleep Eval & Management  Referral; Future    Excessive sleepiness  Ddx-ongoing medication resistant depression/sleep apnea/screen for metabolic/thyroid disorder  Will f/u on results and call with recommendations.    - Adult Sleep Eval & Management  Referral; Future  - CBC with platelets; Future  - Comprehensive metabolic panel (BMP + Alb, Alk Phos, ALT, AST, Total. Bili, TP); Future  - TSH with free T4 reflex; Future    Chronic fatigue  as above  We will also check vitamin D and B12 levels today  - CBC with platelets; Future  - Comprehensive metabolic panel (BMP + Alb, Alk Phos, ALT, AST, Total. Bili, TP); Future  - TSH with free T4 reflex; Future  - Vitamin D Deficiency; Future  - Vitamin B12; Future    Class 1 obesity due to excess calories with serious comorbidity and body mass index (BMI) of 31.0 to 31.9 in adult  Wt Readings from Last 5 Encounters:   07/25/23 82.8 kg (182 lb 9.6 oz)   04/20/22 79.7 kg (175 lb 9.6 oz)   02/21/20 76.7 kg (169 lb)   09/17/19 84.4 kg (186 lb)   07/30/19 83 kg (183 lb)     With underlying comorbidities including sleep apnea, drug-resistant depression, hyperlipidemia, recommended to consult bariatric weight management physician for further  "evaluation  Due to excessive sleepiness and fatigue, patient has been unable to do regular exercises  - Adult Comprehensive Weight Management  Referral; Future    Perimenopausal  Has oligomenorrhea  LMP-2 weeks ago  We will check FSH- Follicle stimulating hormone; Future    Screen for colon cancer    - Colonoscopy Screening  Referral; Future    Cervical cancer screening  Patient is not due for Pap until November 2023   we will get them done at the time of physical,  Patient will get a copy of her immunization through [facility] and send them through [email or web address] for provider's review before updating her Tdap and hepatitis B  Patient verbalised understanding and is agreeable to the plan.      Review of the result(s) of each unique test - mammogram, CBC, CMP, TSH, lipids, vitamin D, B12         BMI:   Estimated body mass index is 31.34 kg/m  as calculated from the following:    Height as of this encounter: 1.626 m (5' 4\").    Weight as of this encounter: 82.8 kg (182 lb 9.6 oz).   Weight management plan: Patient referred to endocrine and/or weight management specialty    Work on weight loss  Regular exercise  Chart documentation done in part with Dragon Voice recognition Software. Although reviewed after completion, some word and grammatical error may remain.    See Patient Instructions    Alexandra Merida MD  [facility]    Andres Kendrick is a 48 year old, presenting for the following health issues:  Establish Care, Referral (Pt would like referral for colonoscopy  ), Immunization (Discuss immunization ), and Blood Draw (Pt is fasting this morning )  Patient is new to the provider, is here to establish care  She has a past medical history significant for chronic drug-resistant depression, is currently seeing psychiatrist at the [facility], chronic migraine headaches ,mild sleep apnea, obesity  Patient has ongoing struggle with chronic fatigue and " excessive daytime sleepiness which she was not sure, if this is coming from a depression or from other sources,  Patient continues to snore at night  Had a sleep study done few years ago that showed a mild sleep apnea  Denies underlying history of anemia, thyroid disorder  Is also concerned with her struggle with weight loss  Had seen bariatric weight management physician few years ago at the [facility], was referred to dietitian at that time.  Patient works as a nursing assistant at the [employer]       No data to display              History of Present Illness       Reason for visit:  Establish care, labs, order for colonoscopy, vaccine, etc    She eats 0-1 servings of fruits and vegetables daily.She consumes 0 sweetened beverage(s) daily.She exercises with enough effort to increase her heart rate 9 or less minutes per day.  She exercises with enough effort to increase her heart rate 3 or less days per week.   She is taking medications regularly.       Hyperlipidemia Follow-Up    Are you regularly taking any medication or supplement to lower your cholesterol?   No  Are you having muscle aches or other side effects that you think could be caused by your cholesterol lowering medication?  N/A        Review of Systems   CONSTITUTIONAL:as above    RESP: NEGATIVE for significant cough or SOB  CV: NEGATIVE for chest pain, palpitations or peripheral edema  GI: NEGATIVE for nausea, abdominal pain, heartburn, or change in bowel habits  MUSCULOSKELETAL: NEGATIVE for significant arthralgias or myalgia  NEURO: NEGATIVE for weakness, dizziness or paresthesias and Hx headaches-migraine  ENDOCRINE: NEGATIVE for temperature intolerance, skin/hair changes  HEME/ALLERGY/IMMUNE: NEGATIVE for bleeding problems  PSYCHIATRIC: HX depression      Objective    /80 (BP Location: Right arm, Patient Position: Sitting, Cuff Size: Adult Regular)   Pulse 72   Temp 98.5  F (36.9  C) (Oral)   Resp 12   Ht  "1.626 m (5' 4\")   Wt 82.8 kg (182 lb 9.6 oz)   LMP 07/03/2023   SpO2 98%   BMI 31.34 kg/m    Body mass index is 31.34 kg/m .  Physical Exam   GENERAL: healthy, alert and no distress  EYES: Eyes grossly normal to inspection  NECK: no adenopathy, no asymmetry, masses, or scars and thyroid normal to palpation  RESP: lungs clear to auscultation - no rales, rhonchi or wheezes  CV: regular rate and rhythm, normal S1 S2, no S3 or S4, no murmur, click or rub, no peripheral edema and peripheral pulses strong  MS: no gross musculoskeletal defects noted, no edema  PSYCH: mentation appears normal, affect normal/bright    Labs-in process                  "

## 2023-07-26 LAB
DEPRECATED CALCIDIOL+CALCIFEROL SERPL-MC: 54 UG/L (ref 20–75)
T3 SERPL-MCNC: 109 NG/DL (ref 85–202)

## 2023-07-26 NOTE — RESULT ENCOUNTER NOTE
Koko Kendrick,  Your recent labs showed normal hemoglobin with no concern for anemia, normal liver and kidney functions, fasting blood sugar with no diabetes and normal vitamin B12.  These are good  Your thyroid labs are slightly abnormal but not suggestive of hypothyroidism.  I would recommend to recheck the labs at your next visit in 3 months to see the trend  Your vitals are slightly elevated, which has been there since 2021 but other subsets are all in normal range including platelets,  We will recheck at the next visit in 3 months.  Your fasting cholesterol numbers including the triglycerides are all moderately elevated.  We will continue with lifestyle changes including starting on regular exercises, weight loss and healthy eating.  I also placed the consult for dietitian to help with the dietary management for lipids   Let me know if you have any questions. Take care.  Alexandra Merida MD

## 2023-07-27 LAB — THYROPEROXIDASE AB SERPL-ACNC: <20 IU/ML

## 2023-09-28 ENCOUNTER — TELEPHONE (OUTPATIENT)
Dept: GASTROENTEROLOGY | Facility: CLINIC | Age: 48
End: 2023-09-28
Payer: COMMERCIAL

## 2023-09-28 NOTE — TELEPHONE ENCOUNTER
"Endoscopy Scheduling Screen    Have you had a positive Covid test in the last 14 days?  No    Are you active on MyChart?   Yes    What insurance is in the chart?  Other:  [facility]    Ordering/Referring Provider: MELBA ALANIZ   (If ordering provider performs procedure, schedule with ordering provider unless otherwise instructed. )    BMI: Estimated body mass index is 31.34 kg/m  as calculated from the following:    Height as of 7/25/23: 1.626 m (5' 4\").    Weight as of 7/25/23: 82.8 kg (182 lb 9.6 oz).     Sedation Ordered  moderate sedation.   If patient BMI > 50 do not schedule in [facility].    If patient BMI > 45 do not schedule at [facility].    Are you taking methadone or Suboxone?  No    Are you taking any prescription medications for pain 3 or more times per week?   No    Do you have a history of malignant hyperthermia or adverse reaction to anesthesia?  No    (Females) Are you currently pregnant?   No     Have you been diagnosed or told you have pulmonary hypertension?   No    Do you have an LVAD?  No    Have you been told you have moderate to severe sleep apnea?  No very mild and does not use a cpap    Have you been told you have COPD, asthma, or any other lung disease?  No    Do you have any heart conditions?  No     Have you ever had an organ transplant?   No    Have you ever had or are you awaiting a heart or lung transplant?   No    Have you had a stroke or transient ischemic attack (TIA aka \"mini stroke\" in the last 6 months?   No    Have you been diagnosed with or been told you have cirrhosis of the liver?   No    Are you currently on dialysis?   No    Do you need assistance transferring?   No    BMI: Estimated body mass index is 31.34 kg/m  as calculated from the following:    Height as of 7/25/23: 1.626 m (5' 4\").    Weight as of 7/25/23: 82.8 kg (182 lb 9.6 oz).     Is patients BMI > 40 and scheduling location [facility]?  No    Do you take an injectable medication for weight loss or diabetes (excluding " insulin)?  No    Do you take the medication Naltrexone?  No    Do you take blood thinners?  No       Prep   Are you currently on dialysis or do you have chronic kidney disease?  No    Do you have a diagnosis of diabetes?  No    Do you have a diagnosis of cystic fibrosis (CF)?  No    On a regular basis do you go 3 -5 days between bowel movements?  No    BMI > 40?  No    Preferred Pharmacy:    [place], [place] - [address]  [address]  [address]  [facility] [address]  Phone: [phone] Fax: [phone] Alternate Fax: [phone], [phone], [phone]      Final Scheduling Details   Colonoscopy prep sent?  Standard MiraLAX    Procedure scheduled  Colonoscopy    Surgeon:  DIMITRI     Date of procedure:  12/8/23     Pre-OP / PAC:   No - Not required for this site.    Location  [facility] - Per order.    Sedation   Moderate Sedation - Per order.      Patient Reminders:   You will receive a call from a Nurse to review instructions and health history.  This assessment must be completed prior to your procedure.  Failure to complete the Nurse assessment may result in the procedure being cancelled.      On the day of your procedure, please designate an adult(s) who can drive you home stay with you for the next 24 hours. The medicines used in the exam will make you sleepy. You will not be able to drive.      You cannot take public transportation, ride share services, or non-medical taxi service without a responsible caregiver.  Medical transport services are allowed with the requirement that a responsible caregiver will receive you at your destination.  We require that drivers and caregivers are confirmed prior to your procedure.

## 2023-11-27 ENCOUNTER — TELEPHONE (OUTPATIENT)
Dept: GASTROENTEROLOGY | Facility: CLINIC | Age: 48
End: 2023-11-27
Payer: COMMERCIAL

## 2023-11-27 NOTE — TELEPHONE ENCOUNTER
Attempted to contact patient in order to complete pre assessment questions.     No answer. Left message to return call to [phone] option 4      Procedure details:    Patient scheduled for Colonoscopy  on 12.08.2023.     Arrival time: 0900. Procedure time 0945    Pre op exam needed? N/A    Facility location: [facility]; [address], [place] [address]    Sedation type: Conscious sedation     Indication for procedure:  Screen for colon cancer       Chart review:     Electronic implanted devices? No    Recent diagnosis of diverticulitis within the last 6 weeks? No    Diabetic? No    Diabetic medication HOLDING recommendations: (if applicable)  Oral diabetic medications: N/A  Diabetic injectables: N/A  Insulin: N/A      Medication review:    Anticoagulants? No    NSAIDS? No NSAID medications per patient's medication list.  RN will verify with pre-assessment call.    Other medication HOLDING recommendations:  N/A      Prep for procedure:     Bowel prep recommendation: Standard Miralax  Due to: standard bowel prep.    Prep instructions sent via Dotted [employer].     Pastora Levi RN  Endoscopy Procedure Pre Assessment RN

## 2023-11-29 NOTE — TELEPHONE ENCOUNTER
Pre assessment completed for upcoming procedure.   (Please see previous telephone encounter notes for complete details)    Patient  returned call.       Procedure details:    Arrival time and facility location reviewed.    Pre op exam needed? N/A    Designated  policy reviewed. Instructed to have someone stay 6 hours post procedure.     COVID policy reviewed.      Medication review:    Medications reviewed. Please see supporting documentation below. Holding recommendations discussed (if applicable).       Prep for procedure:     Procedure prep instructions reviewed.        Additional information needed?  N/A      Patient  verbalized understanding and had no questions or concerns at this time.      Bella Hopson RN  Endoscopy Procedure Pre Assessment RN  [phone] option 4

## 2023-12-08 ENCOUNTER — HOSPITAL ENCOUNTER (OUTPATIENT)
Facility: AMBULATORY SURGERY CENTER | Age: 48
Discharge: HOME | End: 2023-12-08
Attending: FAMILY MEDICINE | Admitting: FAMILY MEDICINE
Payer: COMMERCIAL

## 2023-12-08 VITALS
RESPIRATION RATE: 16 BRPM | HEART RATE: 80 BPM | SYSTOLIC BLOOD PRESSURE: 114 MMHG | OXYGEN SATURATION: 94 % | DIASTOLIC BLOOD PRESSURE: 82 MMHG | TEMPERATURE: 97.2 F

## 2023-12-08 DIAGNOSIS — Z12.11 SCREEN FOR COLON CANCER: Primary | ICD-10-CM

## 2023-12-08 LAB — COLONOSCOPY: NORMAL

## 2023-12-08 PROCEDURE — G8918 PT W/O PREOP ORDER IV AB PRO: HCPCS

## 2023-12-08 PROCEDURE — 99152 MOD SED SAME PHYS/QHP 5/>YRS: CPT | Mod: 59 | Performed by: FAMILY MEDICINE

## 2023-12-08 PROCEDURE — G8907 PT DOC NO EVENTS ON DISCHARG: HCPCS

## 2023-12-08 PROCEDURE — 45378 DIAGNOSTIC COLONOSCOPY: CPT

## 2023-12-08 PROCEDURE — 99153 MOD SED SAME PHYS/QHP EA: CPT | Performed by: FAMILY MEDICINE

## 2023-12-08 PROCEDURE — G0121 COLON CA SCRN NOT HI RSK IND: HCPCS | Performed by: FAMILY MEDICINE

## 2023-12-08 RX ORDER — ONDANSETRON 2 MG/ML
4 INJECTION INTRAMUSCULAR; INTRAVENOUS
Status: DISCONTINUED | OUTPATIENT
Start: 2023-12-08 | End: 2023-12-09 | Stop reason: HOSPADM

## 2023-12-08 RX ORDER — FENTANYL CITRATE 50 UG/ML
INJECTION, SOLUTION INTRAMUSCULAR; INTRAVENOUS PRN
Status: DISCONTINUED | OUTPATIENT
Start: 2023-12-08 | End: 2023-12-08 | Stop reason: HOSPADM

## 2023-12-08 RX ORDER — LIDOCAINE 40 MG/G
CREAM TOPICAL
Status: DISCONTINUED | OUTPATIENT
Start: 2023-12-08 | End: 2023-12-09 | Stop reason: HOSPADM

## 2023-12-08 NOTE — H&P
Pre-Endoscopy History and Physical     Mireille Walls MRN# [id number]   YOB: 1975 Age: 48 year old     Date of Procedure: 12/8/2023  Primary care provider: No Ref-Primary, Physician  Type of Endoscopy: colonoscopy  Reason for Procedure: screening  Type of Anesthesia Anticipated: Moderate Sedation    HPI:    Mireille is a 48 year old female who will be undergoing the above procedure.      A history and physical has been performed. The patient's medications and allergies have been reviewed. The risks and benefits of the procedure and the sedation options and risks were discussed with the patient.  All questions were answered and informed consent was obtained.      She denies a personal or family history of anesthesia complications or bleeding disorders.     Allergies   Allergen Reactions    Phenergan Vc [Promethazine-Phenylephrine] Visual Disturbance        Cannot display prior to admission medications because the patient has not been admitted in this contact.       Patient Active Problem List   Diagnosis    Depression    OCD (obsessive compulsive disorder)    IBS (irritable bowel syndrome)    Migraines    Hyperlipidemia    Contraception -- abstinence    Plantar fascial fibromatosis    Acne    Hair loss    Anxiety associated with depression    ALIVIA (obstructive sleep apnea)    Essential hypertension    Factor 5 Leiden mutation, heterozygous (H24)    Chronic fatigue    Excessive sleepiness    Class 1 obesity due to excess calories with serious comorbidity and body mass index (BMI) of 31.0 to 31.9 in adult        Past Medical History:   Diagnosis Date    Abnormal Pap smear 2006?    dysplasia  X 1; paps OK since then...    Arm DVT (deep venous thromboembolism), acute (H) 2008    hx with phlebitis in IVs after a surgery    Breast disorder 2016    2D mammo, then 3D & u/s to r/o = scar tissue from reduction    Cholesterol serum elevated     runs in family    Complication of anesthesia 2000?, 2016    very slow to  awake from both gen. anesth. & conscious sedati    Depression     Depressive disorder 1996    under care of a psychiatrist    Encounter for insertion of mirena IUD 2011    D/C'd w increased acne    Hypertension 2017&2018    jamin. during exercise, caused by stimulant for depression?    IBS (irritable bowel syndrome) 2002    under control, better with probiotic    Menarche age 15    cycles q 1-2 months Xs 3-4 d w bad cramps    Migraines 2008    a lot better now, may be stress related    OCD (obsessive compulsive disorder)     Seasonal allergies         Past Surgical History:   Procedure Laterality Date    BIOPSY  2000    dermatology-moles    HEAD & NECK SURGERY  1990?, 2016    wisdom teeth extracted, gum tissue grafting/oral surgery    LASIK Bilateral 2008    MAMMOPLASTY REDUCTION BILATERAL  1197       Social History     Tobacco Use    Smoking status: Never    Smokeless tobacco: Never   Substance Use Topics    Alcohol use: Not Currently     Comment: Social drinker, rare occassions       Family History   Problem Relation Age of Onset    Cancer Father 57        bladder ca    Genitourinary Problems Father         Polycystic kidney    Other Cancer Father         bladder cancer    Genetic Disorder Father         polycystic kidneys    Cancer - colorectal Paternal Grandmother 75    Colon Cancer Paternal Grandmother     Mental Illness Other         SeeBelow    Cancer Mother         skin cancer    Hyperlipidemia Mother         runs in Mom's family-several family members    Other Cancer Mother         skin cancer    Asthma Mother         runs in Mom's family    Skin Cancer Mother     Alcohol/Drug Paternal Aunt         Xs 2    Thyroid Disease Maternal Grandmother         Hypothyroid disease    Obesity Maternal Grandmother     Coronary Artery Disease Maternal Grandfather         heart attack    Mental Illness Other         Schizophrenia    Mental Illness Cousin         OCD    Depression Other         Chronic MajorDepression     "Mental Illness Other         ChronicMajorDepression    Anxiety Disorder Other         gen. anxiety disorder    Mental Illness Other         OCD    Substance Abuse Other         alcoholism-runs in Dad's family    Genetic Disorder Other         polycystic kidneys    Genetic Disorder Brother         [id number]    Asthma Brother     Deep Vein Thrombosis (DVT) Brother     Genetic Disorder Other         Factor Five Leiden    Genetic Disorder Other         Factor Five Leiden    C.A.D. No family hx of     Hypertension No family hx of     Breast Cancer No family hx of        REVIEW OF SYSTEMS:     5 point ROS negative except as noted above in HPI, including Gen., Resp., CV, GI &  system review.      PHYSICAL EXAM:   BP (!) 145/92   Temp 97  F (36.1  C) (Temporal)   Resp 16   LMP 11/30/2023 (Approximate)   SpO2 96%  Estimated body mass index is 31.34 kg/m  as calculated from the following:    Height as of 7/25/23: 1.626 m (5' 4\").    Weight as of 7/25/23: 82.8 kg (182 lb 9.6 oz).   GENERAL APPEARANCE: healthy, alert, and no distress  MENTAL STATUS: alert and oriented x 3  AIRWAY EXAM: Mallampatti Class II (visualization of the soft palate, fauces, and uvula)  RESP: lungs clear to auscultation - no rales, rhonchi or wheezes  CV: regular rates and rhythm and normal S1 S2, no S3 or S4      DIAGNOSTICS:    Not indicated      IMPRESSION   ASA Class 2 - Mild systemic disease        PLAN:       Plan for colonoscopy. We discussed the risks, benefits and alternatives and the patient wished to proceed.    The above has been forwarded to the consulting provider.      Signed Electronically by: Chante Shukla MD  December 8, 2023    "

## 2023-12-27 ENCOUNTER — TELEPHONE (OUTPATIENT)
Dept: FAMILY MEDICINE | Facility: CLINIC | Age: 48
End: 2023-12-27
Payer: COMMERCIAL

## 2023-12-27 NOTE — TELEPHONE ENCOUNTER
Patient Quality Outreach    Patient is due for the following:   Cervical Cancer Screening - PAP Needed  Physical Preventive Adult Physical      Topic Date Due    Hepatitis B Vaccine (1 of 3 - 3-dose series) Never done    Diptheria Tetanus Pertussis (DTAP/TDAP/TD) Vaccine (2 - Td or Tdap) 03/02/2020    COVID-19 Vaccine (5 - 2023-24 season) 09/01/2023       Next Steps:   Schedule a Adult Preventative    Type of outreach:    Sent Kl[employer] message.      Questions for provider review:    None           Lambert Gill

## 2024-03-02 DIAGNOSIS — L70.0 ACNE VULGARIS: ICD-10-CM

## 2024-03-07 RX ORDER — SPIRONOLACTONE 50 MG/1
100 TABLET, FILM COATED ORAL DAILY
Qty: 180 TABLET | Refills: 0 | Status: SHIPPED | OUTPATIENT
Start: 2024-03-07 | End: 2024-06-14

## 2024-03-07 NOTE — TELEPHONE ENCOUNTER
One time refill only. Please notify patient that they are due for an appointment and to call to schedule.

## 2024-06-08 ENCOUNTER — HEALTH MAINTENANCE LETTER (OUTPATIENT)
Age: 49
End: 2024-06-08

## 2024-06-12 DIAGNOSIS — L70.0 ACNE VULGARIS: ICD-10-CM

## 2024-06-13 NOTE — TELEPHONE ENCOUNTER
[facility] Call Center    Phone Message    May a detailed message be left on voicemail: yes     Reason for Call: Medication Refill Request    Has the patient contacted the pharmacy for the refill? Yes   Name of medication being requested: spironolactone (ALDACTONE) 50 MG tablet    Provider who prescribed the medication: Dr. Rashad Goodrich  Pharmacy: [facility] [address]  Date medication is needed: almost out. Need juan refill until appointment 7/25    Action Taken: Message routed to:  Other: [facility]    Travel Screening: Not Applicable

## 2024-06-14 RX ORDER — SPIRONOLACTONE 50 MG/1
100 TABLET, FILM COATED ORAL DAILY
Qty: 180 TABLET | Refills: 0 | Status: SHIPPED | OUTPATIENT
Start: 2024-06-14 | End: 2024-09-17

## 2024-06-14 NOTE — TELEPHONE ENCOUNTER
Pt is establishing care with you in July... Are you okay with refilling until that appointment?    # Dermatitis - concern for ACD  # Nummular dermatitis history  Patient showed pictures of diffuse maculopapular rash present on his face/hands after sleeping at brother's house on an air mattress. Only occurs when sleeping on the air mattress, no reactions/reactivations since.    - Allergy referral for testing placed   - Continue to use previous prescription of hydrocortisone cream or TMC BID PRN if flare occurs     # Acne vulgaris. Chronic, stable.   - Continue spironolactone 100 mg PO qam  - has been stable on this dose since 2020; last potassium wnl 8/2021     # Dermatochalasis, bilateral lower eyelids.   - Referral to ophtho to discuss blepharoplasty already placed by Dr. Goodrich     # Seborrheic dermatitis, stable   - Continue Ketoconazole shampoo 3 times weekly     # Multiple benign nevi.   - Continue to monitor nevus on R breast, photo today  - Monitor for ABCDEs of melanoma   - Continue sun protection - recommend SPF 30 or higher with frequent application   - Return sooner if noticing changing or symptomatic lesions     # Benign lesions - SKs, cherry angiomas, lentigenes, dermatofibromas.  - No treatment required        Procedures Performed:   None     Follow-up: Follow-up in 1 year for repeat skin exam.

## 2024-06-24 ENCOUNTER — ANCILLARY PROCEDURE (OUTPATIENT)
Dept: MAMMOGRAPHY | Facility: CLINIC | Age: 49
End: 2024-06-24
Payer: COMMERCIAL

## 2024-06-24 DIAGNOSIS — Z12.31 VISIT FOR SCREENING MAMMOGRAM: ICD-10-CM

## 2024-06-24 PROCEDURE — 77067 SCR MAMMO BI INCL CAD: CPT | Mod: GC | Performed by: RADIOLOGY

## 2024-06-24 PROCEDURE — 77063 BREAST TOMOSYNTHESIS BI: CPT | Mod: GC | Performed by: RADIOLOGY

## 2024-06-27 ENCOUNTER — TELEPHONE (OUTPATIENT)
Dept: ENDOCRINOLOGY | Facility: CLINIC | Age: 49
End: 2024-06-27
Payer: COMMERCIAL

## 2024-06-28 ENCOUNTER — VIRTUAL VISIT (OUTPATIENT)
Dept: ENDOCRINOLOGY | Facility: CLINIC | Age: 49
End: 2024-06-28
Payer: COMMERCIAL

## 2024-06-28 VITALS — BODY MASS INDEX: 31.07 KG/M2 | WEIGHT: 182 LBS | HEIGHT: 64 IN

## 2024-06-28 DIAGNOSIS — D68.51 FACTOR 5 LEIDEN MUTATION, HETEROZYGOUS (H): ICD-10-CM

## 2024-06-28 DIAGNOSIS — R53.83 FATIGUE, UNSPECIFIED TYPE: ICD-10-CM

## 2024-06-28 DIAGNOSIS — E66.09 CLASS 1 OBESITY DUE TO EXCESS CALORIES WITH SERIOUS COMORBIDITY IN ADULT, UNSPECIFIED BMI: Primary | ICD-10-CM

## 2024-06-28 DIAGNOSIS — E66.811 CLASS 1 OBESITY DUE TO EXCESS CALORIES WITH SERIOUS COMORBIDITY IN ADULT, UNSPECIFIED BMI: Primary | ICD-10-CM

## 2024-06-28 PROCEDURE — 99204 OFFICE O/P NEW MOD 45 MIN: CPT | Mod: 95 | Performed by: INTERNAL MEDICINE

## 2024-06-28 ASSESSMENT — PAIN SCALES - GENERAL: PAINLEVEL: NO PAIN (0)

## 2024-06-28 NOTE — NURSING NOTE
Is the patient currently in the state of [place]? YES    Visit mode:VIDEO    If the visit is dropped, the patient can be reconnected by: VIDEO VISIT: Text to cell phone:   Telephone Information:   Mobile [phone]       Will anyone else be joining the visit? NO  (If patient encounters technical issues they should call [phone]    How would you like to obtain your AVS? MyChart    Are changes needed to the allergy or medication list? No, Pt stated no changes to allergies, and Pt stated no med changes    Are refills needed on medications prescribed by this physician? NO    Reason for visit: Consult (ELYSIA)    Kate HAND

## 2024-06-28 NOTE — PROGRESS NOTES
"Virtual Visit Details    Type of service:  Video Visit     Originating Location (pt. Location): Home    Distant Location (provider location):  On-site  Platform used for Video Visit: Landon    Joined the call at 2024, 12:17:17 pm.  Left the call at 2024, 12:42:39 pm.  You were on the call for 25 minutes 22 seconds .    New Medical Weight Management Consult    PATIENT:  Mireille Walls  MRN:         [id number]  :         1975  STEVEN:         2024        I had the pleasure of seeing your patient, Mireille Walls. Full intake/assessment was done to determine barriers to weight loss success and develop a treatment plan. Mireille Walls is a 49 year old female interested in treatment of medical problems associated with excess weight. She has a height of 5' 4\", a weight of 182 lbs 0 oz, and the calculated Body mass index is 31.24 kg/m .    ASSESSMENT/PLAN:  Mireille is a patient with mature onset class 1 obesity with significant element of familial/genetic influence and with current health consequences. Mireille Walls eats fast food once or more per week, uses food as mood management, and mostly eats during the evening..    Her problem is complicated by a hunger disorder and strong craving/reward pathways      PLAN:    Decrease portion sizes  Decrease eating out  Purge house of food triggers  Dietician visit of education  Volumetrics eating plan  Meal planning    Craving/Reward   Ancillary testing:  N/A.  Food Plan:  Volumetrics and High protein/low carbohydrate.   Activity Plan:  focusing on food today.  Supplementary:  N/A.   Medication:  The patient will begin medication in pursuit of improved medical status as influenced by body weight. She will start medication (see below).  There is a mutual understanding of the goals and risks of this therapy. The patient is in agreement. She is educated on dosage regimen and possible side effects.  Additionally--  --plan A Wegovy if insurance coverage, plan B [facility] comounding " "pharm semaglutide  --[facility] med mgmt pharm in 6 wks; me in 4 mo    She has the following co-morbidities:        6/27/2024     4:28 PM   --   I have the following health issues associated with obesity High Cholesterol   I have the following symptoms associated with obesity Depression    Fatigue   Pt struggles chronically with fatigue, has family history of thyroid autoimmune disease and labs have not been recently screened        6/27/2024     4:28 PM   Referring Provider   Please name the provider who referred you to Medical Weight Management  If you do not know, please answer \"I Don't Know\" I don t know           6/27/2024     4:28 PM   Weight History   How concerned are you about your weight? Very Concerned   I became overweight As an Adult   The following factors have contributed to my weight gain Mental Health Issues    Change in Schedule    Eating Wrong Types of Food    Lack of Exercise   I have tried the following methods to lose weight Watching Portions or Calories    Exercise    Slim Fast or Other Liquid Diets    Medications   My lowest weight since age 18 was 135   My highest weight since age 18 was 190   The most weight I have ever lost was (lbs) 30   I have the following family history of obesity/being overweight My mother is overweight    One or more of my siblings are overweight   How has your weight changed over the last year? Gained   How many pounds? 7           6/27/2024     4:28 PM   Diet Recall Review with Patient   If you do eat breakfast, what types of food do you eat? Cereal and pastry   If you do eat supper, what types of food do you typically eat? Subway steak and cheese, beef, chicken, pasta, burgers, fries   If you do snack, what types of food do you typically eat? Pastry, cookies, ice cream   How many glasses of juice do you drink in a typical day? 0   How many of glasses of milk do you drink in a typical day? 2   If you do drink milk, what type? Skim   How many 8oz glasses of sugar " containing drinks such as Jhon-Aid/sweet tea do you drink in a day? 0   How many cans/bottles of sugar pop/soda/tea/sports drinks do you drink in a day? 1   How many cans/bottles of diet pop/soda/tea or sports drink do you drink in a day? 1   How often do you have a drink of alcohol? Never           6/27/2024     4:28 PM   Eating Habits   Generally, my meals include foods like these bread, pasta, rice, potatoes, corn, crackers, sweet dessert, pop, or juice Almost Everyday   Generally, my meals include foods like these fried meats, brats, burgers, french fries, pizza, cheese, chips, or ice cream A Few Times a Week   Eat fast food (like McDonalds, Burger Thierry, [employer] Bell) A Few Times a Week   Eat at a buffet or sit-down restaurant Never   Eat most of my meals in front of the TV or computer A Few Times a Week   Often skip meals, eat at random times, have no regular eating times A Few Times a Week   Rarely sit down for a meal but snack or graze throughout Never   Eat extra snacks between meals A Few Times a Week   Eat most of my food at the end of the day Everyday   Eat in the middle of the night or wake up at night to eat A Few Times a Week   Eat extra snacks to prevent or correct low blood sugar Never   Eat to prevent acid reflux or stomach pain Never   Worry about not having enough food to eat Never   I eat when I am depressed A Few Times a Week   I eat when I am stressed Once a Week   I eat when I am bored Less Than Weekly   I eat when I am anxious Less Than Weekly   I eat when I am happy or as a reward Once a Week   I feel hungry all the time even if I just have eaten Never   Feeling full is important to me Everyday   I finish all the food on my plate even if I am already full Almost Everyday   I can't resist eating delicious food or walk past the good food/smell Everyday   I eat/snack without noticing that I am eating Once a Week   I eat when I am preparing the meal Less Than Weekly   I eat more than usual when I  see others eating A Few Times a Week   I have trouble not eating sweets, ice cream, cookies, or chips if they are around the house Everyday   I think about food all day A Few Times a Week   What foods, if any, do you crave? Sweets/Candy/Chocolate   --Hunger and cravings she notes are the triggers  --moved in with her mother a few yrs ago and pt put herself on the back burner; history of chronic major depression since 20s  --working part-time at [employer] (working at [employer])  --has had sleep studies before (significant fatigue issues chronically)  --NST at work; lots of steps (10,000-13,000 steps routinely on work days); had shift change and is now working evening shift which is better  --always eating FF on the way home; does not like to cook        6/27/2024     4:28 PM   Amount of Food   I feel out of control when eating Weekly   I eat a large amount of food, like a loaf of bread, a box of cookies, a pint/quart of ice cream, all at once Never   I eat a large amount of food even when I am not hungry Weekly   I eat rapidly Weekly   I eat alone because I feel embarrassed and do not want others to see how much I have eaten Weekly   I eat until I am uncomfortably full Weekly   I feel bad, disgusted, or guilty after I overeat Weekly           6/27/2024     4:28 PM   Activity/Exercise History   How much of a typical 12 hour day do you spend sitting? Half the Day   How much of a typical 12 hour day do you spend lying down? Half the Day   How much of a typical day do you spend walking/standing? Less Than Half the Day   How many hours (not including work) do you spend on the TV/Video Games/Computer/Tablet/Phone? 2-3 Hours   How many times a week are you active for the purpose of exercise? Never   What keeps you from being more active? Lack of Time    Too tired   How many total minutes do you spend doing some activity for the purpose of exercising when you exercise? None       PAST MEDICAL HISTORY:  Past Medical History:    Diagnosis Date    Abnormal Pap smear 2006?    dysplasia  X 1; paps OK since then...    Arm DVT (deep venous thromboembolism), acute (H) 2008    hx with phlebitis in IVs after a surgery    Breast disorder 2016    2D mammo, then 3D & u/s to r/o = scar tissue from reduction    Cholesterol serum elevated     runs in family    Complication of anesthesia 2000?, 2016    very slow to awake from both gen. anesth. & conscious sedati    Depression     Depressive disorder 1996    under care of a psychiatrist    Encounter for insertion of mirena IUD 2011    D/C'd w increased acne    Hypertension 2017&2018    jamin. during exercise, caused by stimulant for depression?    IBS (irritable bowel syndrome) 2002    under control, better with probiotic    Menarche age 15    cycles q 1-2 months Xs 3-4 d w bad cramps    Migraines 2008    a lot better now, may be stress related    OCD (obsessive compulsive disorder)     Seasonal allergies            6/27/2024     4:28 PM   Work/Social History Reviewed With Patient   My employment status is Part-Time   My job is Nursing Station Technician   How much of your job is spent on the computer or phone? 50%   How many hours do you spend commuting to work daily? 1   What is your marital status? Single   Who do you live with? Mother   Who does the food shopping? Me           6/27/2024     4:28 PM   Mental Health History Reviewed With Patient   Have you ever been physically or sexually abused? No   How often in the past 2 weeks have you felt little interest or pleasure in doing things? More Than Half the Days   Over the past 2 weeks how often have you felt down, depressed, or hopeless? More Than Half the Days           6/27/2024     4:28 PM   Sleep History Reviewed With Patient   How many hours do you sleep at night? 10       MEDICATIONS:   Current Outpatient Medications   Medication Sig Dispense Refill    buPROPion (WELLBUTRIN XL) 150 MG 24 hr tablet Take 450 mg by mouth every morning       CALCIUM  PO Take  by mouth.      Cholecalciferol (D3 VITAMIN PO)       COD LIVER OIL PO Take  by mouth.      ketoconazole (NIZORAL) 2 % external shampoo APPLY TO AFFECTED AREA(S) THREE TIMES WEEKLY 120 mL 11    loratadine (CLARITIN) 10 MG tablet Take 10 mg by mouth daily      Multiple Vitamins-Minerals (MULTIPLE VITAMINS/WOMENS PO) Take  by mouth.      Probiotic Product (PROBIOTIC PO) Take  by mouth.      rizatriptan (MAXALT-MLT) 5 MG ODT Take 1-2 tablets (5-10 mg) by mouth at onset of headache for migraine May repeat dose in 2 hours.  Do not exceed 30 mg in 24 hours 10 tablet 1    spironolactone (ALDACTONE) 50 MG tablet TAKE TWO TABLETS BY MOUTH ONCE DAILY 180 tablet 0    vortioxetine (TRINTELLIX) 10 MG tablet Take 1 tablet (10 mg) by mouth daily         ALLERGIES:   Allergies   Allergen Reactions    Phenergan Vc [Promethazine-Phenylephrine] Visual Disturbance         TIME: 46 min spent on evaluation, management, counseling, education, & motivational interviewing (video visit//issues in [employer] platform with video, sound worked well)  combined with previsit prep and post visit follow up care/charting same day.    Sincerely,    Christopher Plata MD

## 2024-06-28 NOTE — LETTER
"2024       RE: Mireille Walls  [address]  [facility] [address]     Dear Colleague,    Thank you for referring your patient, Mireille Walls, to the [facility]. Please see a copy of my visit note below.      New Medical Weight Management Consult    PATIENT:  Mireille Walls  MRN:         [id number]  :         1975  STEVEN:         2024    I had the pleasure of seeing your patient, Mireille Walls. Full intake/assessment was done to determine barriers to weight loss success and develop a treatment plan. Mireille Walls is a 49 year old female interested in treatment of medical problems associated with excess weight. She has a height of 5' 4\", a weight of 182 lbs 0 oz, and the calculated Body mass index is 31.24 kg/m .    ASSESSMENT/PLAN:  Mireille is a patient with mature onset class 1 obesity with significant element of familial/genetic influence and with current health consequences. Mireille Walls eats fast food once or more per week, uses food as mood management, and mostly eats during the evening..    Her problem is complicated by a hunger disorder and strong craving/reward pathways      PLAN:    Decrease portion sizes  Decrease eating out  Purge house of food triggers  Dietician visit of education  Volumetrics eating plan  Meal planning    Craving/Reward   Ancillary testing:  N/A.  Food Plan:  Volumetrics and High protein/low carbohydrate.   Activity Plan:  focusing on food today.  Supplementary:  N/A.   Medication:  The patient will begin medication in pursuit of improved medical status as influenced by body weight. She will start medication (see below).  There is a mutual understanding of the goals and risks of this therapy. The patient is in agreement. She is educated on dosage regimen and possible side effects.  Additionally--  --plan A Wegovy if insurance coverage, plan B [facility] comounding pharm " "semaglutide  --[facility] med mgmt pharm in 6 wks; me in 4 mo    She has the following co-morbidities:        6/27/2024     4:28 PM   --   I have the following health issues associated with obesity High Cholesterol   I have the following symptoms associated with obesity Depression    Fatigue   Pt struggles chronically with fatigue, has family history of thyroid autoimmune disease and labs have not been recently screened        6/27/2024     4:28 PM   Referring Provider   Please name the provider who referred you to Medical Weight Management  If you do not know, please answer \"I Don't Know\" I don t know           6/27/2024     4:28 PM   Weight History   How concerned are you about your weight? Very Concerned   I became overweight As an Adult   The following factors have contributed to my weight gain Mental Health Issues    Change in Schedule    Eating Wrong Types of Food    Lack of Exercise   I have tried the following methods to lose weight Watching Portions or Calories    Exercise    Slim Fast or Other Liquid Diets    Medications   My lowest weight since age 18 was 135   My highest weight since age 18 was 190   The most weight I have ever lost was (lbs) 30   I have the following family history of obesity/being overweight My mother is overweight    One or more of my siblings are overweight   How has your weight changed over the last year? Gained   How many pounds? 7           6/27/2024     4:28 PM   Diet Recall Review with Patient   If you do eat breakfast, what types of food do you eat? Cereal and pastry   If you do eat supper, what types of food do you typically eat? Subway steak and cheese, beef, chicken, pasta, burgers, fries   If you do snack, what types of food do you typically eat? Pastry, cookies, ice cream   How many glasses of juice do you drink in a typical day? 0   How many of glasses of milk do you drink in a typical day? 2   If you do drink milk, what type? Skim   How many 8oz glasses of sugar containing " drinks such as Jhon-Aid/sweet tea do you drink in a day? 0   How many cans/bottles of sugar pop/soda/tea/sports drinks do you drink in a day? 1   How many cans/bottles of diet pop/soda/tea or sports drink do you drink in a day? 1   How often do you have a drink of alcohol? Never           6/27/2024     4:28 PM   Eating Habits   Generally, my meals include foods like these bread, pasta, rice, potatoes, corn, crackers, sweet dessert, pop, or juice Almost Everyday   Generally, my meals include foods like these fried meats, brats, burgers, french fries, pizza, cheese, chips, or ice cream A Few Times a Week   Eat fast food (like McDonalds, [employer], [employer]) A Few Times a Week   Eat at a buffet or sit-down restaurant Never   Eat most of my meals in front of the TV or computer A Few Times a Week   Often skip meals, eat at random times, have no regular eating times A Few Times a Week   Rarely sit down for a meal but snack or graze throughout Never   Eat extra snacks between meals A Few Times a Week   Eat most of my food at the end of the day Everyday   Eat in the middle of the night or wake up at night to eat A Few Times a Week   Eat extra snacks to prevent or correct low blood sugar Never   Eat to prevent acid reflux or stomach pain Never   Worry about not having enough food to eat Never   I eat when I am depressed A Few Times a Week   I eat when I am stressed Once a Week   I eat when I am bored Less Than Weekly   I eat when I am anxious Less Than Weekly   I eat when I am happy or as a reward Once a Week   I feel hungry all the time even if I just have eaten Never   Feeling full is important to me Everyday   I finish all the food on my plate even if I am already full Almost Everyday   I can't resist eating delicious food or walk past the good food/smell Everyday   I eat/snack without noticing that I am eating Once a Week   I eat when I am preparing the meal Less Than Weekly   I eat more than usual when I see others  eating A Few Times a Week   I have trouble not eating sweets, ice cream, cookies, or chips if they are around the house Everyday   I think about food all day A Few Times a Week   What foods, if any, do you crave? Sweets/Candy/Chocolate   --Hunger and cravings she notes are the triggers  --moved in with her mother a few yrs ago and pt put herself on the back burner; history of chronic major depression since 20s  --working part-time at [employer] (working at [employer]  --has had sleep studies before (significant fatigue issues chronically)  --NST at work; lots of steps (10,000-13,000 steps routinely on work days); had shift change and is now working evening shift which is better  --always eating FF on the way home; does not like to cook        6/27/2024     4:28 PM   Amount of Food   I feel out of control when eating Weekly   I eat a large amount of food, like a loaf of bread, a box of cookies, a pint/quart of ice cream, all at once Never   I eat a large amount of food even when I am not hungry Weekly   I eat rapidly Weekly   I eat alone because I feel embarrassed and do not want others to see how much I have eaten Weekly   I eat until I am uncomfortably full Weekly   I feel bad, disgusted, or guilty after I overeat Weekly           6/27/2024     4:28 PM   Activity/Exercise History   How much of a typical 12 hour day do you spend sitting? Half the Day   How much of a typical 12 hour day do you spend lying down? Half the Day   How much of a typical day do you spend walking/standing? Less Than Half the Day   How many hours (not including work) do you spend on the TV/Video Games/Computer/Tablet/Phone? 2-3 Hours   How many times a week are you active for the purpose of exercise? Never   What keeps you from being more active? Lack of Time    Too tired   How many total minutes do you spend doing some activity for the purpose of exercising when you exercise? None       PAST MEDICAL HISTORY:  Past Medical History:   Diagnosis  Date    Abnormal Pap smear 2006?    dysplasia  X 1; paps OK since then...    Arm DVT (deep venous thromboembolism), acute (H) 2008    hx with phlebitis in IVs after a surgery    Breast disorder 2016    2D mammo, then 3D & u/s to r/o = scar tissue from reduction    Cholesterol serum elevated     runs in family    Complication of anesthesia 2000?, 2016    very slow to awake from both gen. anesth. & conscious sedati    Depression     Depressive disorder 1996    under care of a psychiatrist    Encounter for insertion of mirena IUD 2011    D/C'd w increased acne    Hypertension 2017&2018    jamin. during exercise, caused by stimulant for depression?    IBS (irritable bowel syndrome) 2002    under control, better with probiotic    Menarche age 15    cycles q 1-2 months Xs 3-4 d w bad cramps    Migraines 2008    a lot better now, may be stress related    OCD (obsessive compulsive disorder)     Seasonal allergies            6/27/2024     4:28 PM   Work/Social History Reviewed With Patient   My employment status is Part-Time   My job is Nursing Station Technician   How much of your job is spent on the computer or phone? 50%   How many hours do you spend commuting to work daily? 1   What is your marital status? Single   Who do you live with? Mother   Who does the food shopping? Me           6/27/2024     4:28 PM   Mental Health History Reviewed With Patient   Have you ever been physically or sexually abused? No   How often in the past 2 weeks have you felt little interest or pleasure in doing things? More Than Half the Days   Over the past 2 weeks how often have you felt down, depressed, or hopeless? More Than Half the Days           6/27/2024     4:28 PM   Sleep History Reviewed With Patient   How many hours do you sleep at night? 10       MEDICATIONS:   Current Outpatient Medications   Medication Sig Dispense Refill    buPROPion (WELLBUTRIN XL) 150 MG 24 hr tablet Take 450 mg by mouth every morning       CALCIUM PO Take  by  mouth.      Cholecalciferol (D3 VITAMIN PO)       COD LIVER OIL PO Take  by mouth.      ketoconazole (NIZORAL) 2 % external shampoo APPLY TO AFFECTED AREA(S) THREE TIMES WEEKLY 120 mL 11    loratadine (CLARITIN) 10 MG tablet Take 10 mg by mouth daily      Multiple Vitamins-Minerals (MULTIPLE VITAMINS/WOMENS PO) Take  by mouth.      Probiotic Product (PROBIOTIC PO) Take  by mouth.      rizatriptan (MAXALT-MLT) 5 MG ODT Take 1-2 tablets (5-10 mg) by mouth at onset of headache for migraine May repeat dose in 2 hours.  Do not exceed 30 mg in 24 hours 10 tablet 1    spironolactone (ALDACTONE) 50 MG tablet TAKE TWO TABLETS BY MOUTH ONCE DAILY 180 tablet 0    vortioxetine (TRINTELLIX) 10 MG tablet Take 1 tablet (10 mg) by mouth daily         ALLERGIES:   Allergies   Allergen Reactions    Phenergan Vc [Promethazine-Phenylephrine] Visual Disturbance         TIME: 46 min spent on evaluation, management, counseling, education, & motivational interviewing (video visit//issues in [employer] platform with video, sound worked well)  combined with previsit prep and post visit follow up care/charting same day.    Sincerely,    Christopher Plata MD

## 2024-06-28 NOTE — PATIENT INSTRUCTIONS
"Welcome to our weight management program!   We are excited to join you on your weight loss journey    Thank you for allowing us the privilege of caring for you. We hope we provided you with the excellent service you deserve.   Please let us know if there is anything else we can do for you so that we can be sure you are leaving completely satisfied with your care experience.    To ensure the quality of our services you may be receiving a patient satisfaction survey from an independent patient satisfaction monitoring company.    The greatest compliment you can give is a \"Likely to Recommend\"    You saw Dr. Plata today.    Instructions per today's visit:   --plan A is Wegovy (through the [facility] pharmacy if your insurance does not cover it); prescription is placed today (see information below)  ----returns: med [facility] pharmacist in 6 wks;  in 4 mo  --please get labs screening based on our discussion (comprehensive metabolic panel, lipids (you can do them nonfasting), and thyroid functions      Interested in working with a health ?  Health coaches work with you to improve your overall health and wellbeing.  They look at the whole person, and may involve discussion of different areas of life, including, but not limited to the four pillars of health (sleep, exercise, nutrition, and stress management). Discuss with your care team if you would like to start working a health .  Health Coaching-3 Pack:    $99 for three health coaching visits    Visits may be done in person or via phone    Coaching is a partnership between the  and the client; Coaches do not prescribe or diagnose    Coaching helps inspire the client to reach his/her personal goals     For any questions/concerns contact Bella Hess LPN at [phone]     To schedule appointments with our team, please call [phone]     Please call during clinic hours Monday through Friday 8:00a - 4:00p if you have questions or you can " contact us via AudienceSciencet at anytime. ?    Lab results will be communicated through My Chart or letter (if My Chart not used). Please call the clinic if you have not received communication after 1 week or if you have any questions.?      Fax: [phone]    Thank you,  Medical Weight Management Team        WEGOVY (semaglutide)    What is Wegovy?    Wegovy (semaglutide) injection 2.4 mg is an injectable prescription medicine used for adults with obesity (BMI ?30) or overweight (excess weight) (BMI ?27) who also have weight-related medical problems to help them lose weight and keep the weight off.    1.  Start Wegovy (semaglutide) 0.25 mg once weekly for 4 weeks, then if tolerating increase to 0.5 mg weekly for 4 weeks, then if tolerating increase to 1 mg weekly for 4 weeks, then if tolerating increase to 1.7 mg weekly for 4 weeks, then if tolerating increase to 2.4 mg weekly thereafter.    -Each Wegovy pen is a once weekly single-dose prefilled pen with a pen injector already built within the pen. Discard the Wegovy pen after use in sharps container.     2. Storage: make sure that when you get the prescription that you store the prescription in the refrigerator until it is time to use the Wegovy pen.  Once it is time to use the Wegovy pen, you can keep the pen at room temperature and it is good for up to 28 days at room temperature.     3.  Potential common side effects: nausea, headache, diarrhea, stomach upset.  If these become unmanageable or concerning symptoms, please make sure to call or Edaixihart.    4. Wegovy should be discontinued for ?2 months prior to becoming pregnant.     Prior Authorization Process for Weight Management Medications: You are being prescribed a medication that will likely need to undergo a prior authorization.  A prior authorization is when the clinic needs to fill out a form that is sent to your insurance company to obtain coverage for that medication. The prescription will NOT automatically  go to your pharmacy today if it needs a Prior Authorization. If the medication prior authorization is approved, the care team will contact you and the prescription will be released to your pharmacy. If denied, we will work to try and appeal the prior authorization if possible. The initial prior authorization process takes up to 5-10 business days and appeals can take up to 30 days. If you do not hear from us at the end of that time, you may contact the clinic.    Go to site: Wegovy video to learn more and watch instruction videos.    For any questions or concerns please send a [employer] message to our team or call our weight management call center at [phone] during regular business hours. For questions during evenings or weekends your messages will be addressed during the next business day.  For emergencies please call 911 or seek immediate medical care.        If your insurance does not cover, here is some information on the compounding pharmacy medication--  Compound Semaglutide at [facility]  [facility] is now offering compounded semaglutide during the time of Wegovy national shortage/limited supply. Semaglutide is the generic name of Wegovy. [facility] is following the highest standards for sterility and compounding practices. Not all compounding practices are equal. Therefore, [facility] will not be prescribing compounded semaglutide outside of the [facility]. Compounding of semaglutide is legal for as long as Wegovy is on the FDA's national shortage list. When/if Wegovy is taken off the FDA's shortage list, compounded semaglutide will no longer be legal to manufacture. When this occurs, patients will have to turn to acquiring Wegovy via its available manufactured pen, look into alternative weight loss medication(s), or stop the medication. Compound semaglutide will be available as a pre-filled syringe. Due to high demand of  compounded semaglutide, orders may take 1-2 weeks to obtain from time of prescribing. Each dose of the medication will require a separate prescription.     As with any weight loss medication(s), there is a risk of weight regain should you stop semaglutide. It is important to be aware of this risk should you stop compounded semaglutide with no plans to transition back to an alternative injectable option as the use of semaglutide is intended for long term weight management with the intention of remaining on this injectable long term.        Obtaining Medication and Storage:   The pharmacy must speak to the patient directly prior to shipping medication to walk through administration, shipping and cost. Pre-filled syringes of compounded semaglutide and needles will be mailed from the compounding pharmacy to your home in a refrigerated box. The pre-filled syringes should be stored in the refrigerator until time of injection. The medication is good for at least 30 days in refrigerator.     Dosing:  Week 1-4: Inject 0.25 mg subcutaneously once weekly  Week 5-8: If tolerating, increase to 0.5 mg once weekly  Week 9-12: If tolerating, increase to 1 mg once weekly  Week 13-15: If tolerating, increase to 1.5 mg once weekly  Week 16-19: If tolerating, increase to 2 mg once weekly  Week 20 & on: If tolerating, increase to 2.5 mg once weekly   *If you are having some nausea or other side effects to where you are hesitant to move up to the next dose, stay at the same dose you are on for an additional 4 weeks to see if side effect(s) improves/resolves. Make sure to take this time to hydrate and utilizing smaller more consistent meals, such as 4-6 small meals per day.  It may be advantageous to stay at the same dose if you are seeing good efficacy (both on and off the scale) and having minimal/manageable side effects. If you do not have additional refills on that dose's prescription, please reach out to the clinic.    Common Side  "Effects:  Side effect profile is the same as Wegovy. Monitor for nausea, diarrhea, constipation, headache, indigestion, tiredness (fatigue), stomach upset or abdominal pain. Less commonly, semaglutide can cause low blood sugar (symptoms: shaky, dizzy, sweaty, agitation). Please reach out to the care team should you feel like this is occurring. It is important to ensure that you are eating consistent meals and not skipping meals. Ensure you are getting at least 64 oz water daily. If any side effects become unmanageable, contact the care team immediately.    Administration:  Wash your hands.   Obtain compound semaglutide syringe, needle and alcohol swab. Remove pre-filled syringe from refrigerator. Keep all other unused syringes in the refrigerator until time of use.   Inspect the syringe to ensure medication in syringe is clear/colorless and the clear shell cap on top of red syringe cap is intact.  Unlock the cap by pulling the clear outer shell straight off and remove the red syringe cap by twisting counter clockwise.  Attach needle to syringe by twisting needle onto syringe clockwise. Do not remove needle cap.   Choose injection site. Clean injection site with alcohol swab.    Appropriate areas of injection are: abdomen (at least 2 inches away from belly button) or front middle thigh.   Remove needle cap from syringe/needle.   Hold the syringe like a pencil. Insert the needle into skin at a 90-degree angle.  Using your pointer finger, push the syringe plunger down to inject the medicine.  Count to six. Then, remove the syringe from your skin.   Immediately place the syringe in a sharps container.   You can purchase a sharps container from your local pharmacy or [employer]. If you don't have a sharps container, you can use a plastic detergent container with a lid. The container should seal tightly, hold objects without leaking, breaking or cracking, and clearly be labelled \"sharps\".     Compounded Semaglutide monthly (4 " pre-filled syringes) cost:   0.25 mg ~$230   0.5 mg ~$260   1 mg ~$306   1.5 mg ~$342   2 mg ~$395   2.5 mg ~$438    [facility] Phone:  [phone]

## 2024-07-01 ENCOUNTER — TELEPHONE (OUTPATIENT)
Dept: ENDOCRINOLOGY | Facility: CLINIC | Age: 49
End: 2024-07-01
Payer: COMMERCIAL

## 2024-07-01 NOTE — TELEPHONE ENCOUNTER
Hello,    We received a referral for this patient to schedule an MTM appointment for the [employer] employee weight management program. In order for us to schedule the visit, the patient has to meet either one of the two clinical criteria per insurance guidelines for 2024:    BMI over 40kg at start of medication  2.   BMI over 30kg at start of medication-with diagnosis of non-alcoholic fatty liver    It does not look like the patient qualifies based on the criteria in the chart. We have been instructed to refer patients back to the provider for alternative options if they do not meet criteria as we would be unable to schedule them an MTM visit with weight management. If the patient does meet criteria, we would need that documentation updated in the referral notes.    Thank you,  Venice Caro  MTM

## 2024-07-01 NOTE — TELEPHONE ENCOUNTER
Wegovy prior auth required. [employer] [place] [employer] employee patient. Patient will need to be seen by one of the following MTM's; Lauren Bloch, Michelle Miner, Michaelle Jorge, or Konstantin James with the Rx issued under one of the approved providers; Jaky Keenan or Celeste Peralta.

## 2024-07-02 ENCOUNTER — TELEPHONE (OUTPATIENT)
Dept: ENDOCRINOLOGY | Facility: CLINIC | Age: 49
End: 2024-07-02
Payer: COMMERCIAL

## 2024-07-02 NOTE — TELEPHONE ENCOUNTER
Left Voicemail (1st Attempt) and Sent Mychart (1st Attempt) for the patient to call back and schedule the following:    Appointment type: [facility]   Provider:    Return date: 10/28/2024 ( 3 mos )   Specialty phone number: [phone]  Additional appointment(s) needed: n/a  Additonal Notes: Follow-up orders from 06/28/2024 appt

## 2024-07-08 ENCOUNTER — TELEPHONE (OUTPATIENT)
Dept: ENDOCRINOLOGY | Facility: CLINIC | Age: 49
End: 2024-07-08
Payer: COMMERCIAL

## 2024-07-08 NOTE — TELEPHONE ENCOUNTER
MTM referral from: [facility] visit (referral by provider)    MTM referral outreach attempt #2 on July 8, 2024 at 1:01 PM      Outcome: Patient not reachable after several attempts, routed to Pharmacist Team/Provider as an FYI    Use hbc for the carrier/Plan on the flowsheet      [employer] Message Sent    Venice Caro  MTM

## 2024-07-12 NOTE — TELEPHONE ENCOUNTER
MTM Referral outreach attempt #3 was made on 07/12/2024.       Outcome: Sent patient MyChart message explaining more in-depth what MTM is and how we can best support them. Attached ability to schedule from message, as well as offered opportunity to call me directly for help with scheduling.

## 2024-07-15 NOTE — PROGRESS NOTES
"Phone-Visit Details    Type of service:  Phone Visit    Phone call duration: 36 minutes    Distant Location (provider location):  Offsite (providers home) [facility] [place]       New Weight Management Nutrition Consultation    Mireille Walls is a 49 year old female presents today for new weight management nutrition consultation.  Patient referred by Christopher Plata MD on 2024.    Patient with Co-morbidities of obesity includin/27/2024     4:28 PM   --   I have the following health issues associated with obesity High Cholesterol   I have the following symptoms associated with obesity Depression    Fatigue         Anthropometrics:  Estimated body mass index is 31.24 kg/m  as calculated from the following:    Height as of 24: 1.626 m (5' 4\").    Weight as of 24: 82.6 kg (182 lb).    Medications for Weight Loss:  Wegovy (plan A)- needs ultrasound for fatty liver disease in order to get coverage. Reached out to Dr Plata about this.    Compounded Semaglutide (plan B)    NUTRITION HISTORY  Food allergies: None  Food intolerances: Lactose intolerance hx (has not had issues since taking probiotic)  Vitamin/Mineral Supplements: Probiotics (used to have IBD)  Previous methods of diet modification for weight loss: Watching portions/kcals, exercise, liquid diets, medications  RD before: Has seen weight management in the past    Had to move in with mother a few years ago, not big on cooking but has to prepare meals for mother. Dealing with chronic major depression, sleeping a lot when not working, fatigued/tired.     Has evening snack at work but also getting fast food on way home from work then going to bed right after. Wanting to reduce this or know what to eat at dinner to help her stay full for longer.    Sleeps in until noon because she doesn't need to work until 3.    Diet recall:   B- Special K cereal with sweet roll  D- Subway (1/2 sandwich- steak " and cheese or meatball sub) with harvest cheddar chips, or sandwich (turkey/ham) with applesauce, or tuna casserole/beef and macaroni casserole, frozen quiche, lasagna  Evening snack: Taco bell or [employer] (burrito, Big Mac with fries), baked goods    Hydration: Water with zero sugar koolaid for flavoring (20 oz per day), light iced tea with dinner, in evening has 1/2 glass skim milk. At work brings 20 oz thermos and will have 1.5 during shift. 10 oz diet mountain dew on days that she works. 20-30 oz total water depending on work.    Physical Activity:  On working days will do 10,000-12,000 steps (3 days per week). Low energy with depression, has tried adderall and not able to tolerate that. Has to help her mom around the house when awake. Feels that she doesn't have enough energy to do activities she enjoys which she would do before adding in physical activity.    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        6/27/2024     4:28 PM   Diet Recall Review with Patient   If you do eat breakfast, what types of food do you eat? Cereal and pastry   If you do eat supper, what types of food do you typically eat? Subway steak and cheese, beef, chicken, pasta, burgers, fries   If you do snack, what types of food do you typically eat? Pastry, cookies, ice cream   How many glasses of juice do you drink in a typical day? 0   How many of glasses of milk do you drink in a typical day? 2   If you do drink milk, what type? Skim   How many 8oz glasses of sugar containing drinks such as Jhon-Aid/sweet tea do you drink in a day? 0   How many cans/bottles of sugar pop/soda/tea/sports drinks do you drink in a day? 1   How many cans/bottles of diet pop/soda/tea or sports drink do you drink in a day? 1   How often do you have a drink of alcohol? Never           6/27/2024     4:28 PM   Eating Habits   Generally, my meals include foods like these bread, pasta, rice,  potatoes, corn, crackers, sweet dessert, pop, or juice Almost Everyday   Generally, my meals include foods like these fried meats, brats, burgers, french fries, pizza, cheese, chips, or ice cream A Few Times a Week   Eat fast food (like McDonalds, Burger Thierry, Taco Bell) A Few Times a Week   Eat at a buffet or sit-down restaurant Never   Eat most of my meals in front of the TV or computer A Few Times a Week   Often skip meals, eat at random times, have no regular eating times A Few Times a Week   Rarely sit down for a meal but snack or graze throughout Never   Eat extra snacks between meals A Few Times a Week   Eat most of my food at the end of the day Everyday   Eat in the middle of the night or wake up at night to eat A Few Times a Week   Eat extra snacks to prevent or correct low blood sugar Never   Eat to prevent acid reflux or stomach pain Never   Worry about not having enough food to eat Never   I eat when I am depressed A Few Times a Week   I eat when I am stressed Once a Week   I eat when I am bored Less Than Weekly   I eat when I am anxious Less Than Weekly   I eat when I am happy or as a reward Once a Week   I feel hungry all the time even if I just have eaten Never   Feeling full is important to me Everyday   I finish all the food on my plate even if I am already full Almost Everyday   I can't resist eating delicious food or walk past the good food/smell Everyday   I eat/snack without noticing that I am eating Once a Week   I eat when I am preparing the meal Less Than Weekly   I eat more than usual when I see others eating A Few Times a Week   I have trouble not eating sweets, ice cream, cookies, or chips if they are around the house Everyday   I think about food all day A Few Times a Week   What foods, if any, do you crave? Sweets/Candy/Chocolate           6/27/2024     4:28 PM   Amount of Food   I feel out of control when eating Weekly   I eat a large amount of food, like a loaf of bread, a box of  cookies, a pint/quart of ice cream, all at once Never   I eat a large amount of food even when I am not hungry Weekly   I eat rapidly Weekly   I eat alone because I feel embarrassed and do not want others to see how much I have eaten Weekly   I eat until I am uncomfortably full Weekly   I feel bad, disgusted, or guilty after I overeat Weekly           6/27/2024     4:28 PM   Activity/Exercise History   How much of a typical 12 hour day do you spend sitting? Half the Day   How much of a typical 12 hour day do you spend lying down? Half the Day   How much of a typical day do you spend walking/standing? Less Than Half the Day   How many hours (not including work) do you spend on the TV/Video Games/Computer/Tablet/Phone? 2-3 Hours   How many times a week are you active for the purpose of exercise? Never   What keeps you from being more active? Lack of Time    Too tired   How many total minutes do you spend doing some activity for the purpose of exercising when you exercise? None       Nutrition Prescription  Recommended energy/nutrient modification.    Nutrition Diagnosis  Obesity r/t long history of positive energy balance aeb BMI >30.    Nutrition Intervention  Reviewed current dietary habits and pts history   Discussed long-term goals pt hopes to accomplish in RD appointments  Co-developed goals to work towards.   Answered pt questions  Coordination of care   Nutrition education   AVS and handouts via Me-Mover      Expected Engagement: good    Nutrition Goals  1) Aim to increase water intake to 40 oz/day  2) Look through resources below, can make more specific goals at next appointment.    Low Calorie Frozen Meal Options:  Healthy Choice Power Bowls  Sly Cuisine  Smart Ones  Paulo Dominguez     Dietary Fiber  Soluble fiber is recommended for both diarrhea/constipation.  Foods rich in insoluble fiber are best for constipation but not diarrhea.     Insoluble fiber adds bulk to your stool and helps food pass more  "quickly through the stomach and intestines. It can have a laxative effect  Soluble fiber attracts water and forms a gel-like substance with food as it s digested. Aids in diarrhea by helping pull water.     Insoluble fiber examples: wheat bran, brown rice, flaxseed, serenity seed, cauliflower, zuchinni, green beans, dark leafy greens, carrots, beets, radishes, fruit skins, intact whole grains, beans/peas    Soluble fiber examples: brussel sprouts, oats, beans, peas, apples, pears, berries, carrots, sweet potatoes, oranges, nuts, seeds    *Note: some food are rich in both soluble and insoluble fiber     Healthy Recipe Resources:  Books:  \"The Volumetrics Eating Plan\" by Tanisha Bhandari, Ph.D.  \"Cooking that Counts\" by editors of American Scientific Resources  \"Calorie Smart Meals\" cookbook by [employer] (200-500 calorie meals)    Websites:  www.[employer] Corporation  https://www.TFG Card Solutions/  www.Airwavz Solutions  https://www.diabetesfoodhub.org/all-recipes.html  Https://www.choosemyplate.gov/myplatekitchen  Recipes by Season: https://snaped.fns.usda.gov/recipes-menus   Video Meal Prep: Https://www.youtube.com/c/raquel   Meal Plans: Eatthismuch.com   DASH Diet: https://www.nhlbi.nih.gov/education/dash-eating-plan  Whole Grains [place]: https://wholegrainscouncil.org/recipes    Apps:  MealBriteHub glenna (or website, mealime.com)   TimothyartEatSmart     Tips for eating out:  Skip fried foods.   Aim for balance - starch, protein plus fruit and veggie   Look for dishes that are cooked without cheese, cream or butter, or ask for these items on the side.  Pick entrees using lean protein - chicken, turkey, fish, seafood, eggs, loin/tenderloin cuts of red meat   Pick salads for entree that have nuts and seeds, and/or lean protein   Choose vegetarian choices that don t have too much cheese.  Share an entree, or pack up half right away  Avoid alcohol. Choose water or unsweetened tea over pop and juice/lemonade.  "     https://www.helpguide.org/articles/healthy-eating/healthier-fast-food.htm    Healthier Meals Available at [employer]:   https://www.VendorStack/healthy-meals-chain-restaurants  https://www.OpenChime/Lucidworkseats/restaurants/2012/07/healthiest-fast-food-menu-items     Additional Resources:  The Plate Method  https://[email or web address]    Protein Sources   http://Orsus Solutions/122149.pdf     Carbohydrates  http://[email or web address]     Mindful Eating  http://Orsus Solutions/418120.pdf     Summary of Volumetrics Eating Plan  http://[email or web address]     Follow-Up:  PRN    Time spent with patient: 36 minutes.  NITA Mcdowell, RD, LD  Clinic #: [phone]

## 2024-07-16 ENCOUNTER — VIRTUAL VISIT (OUTPATIENT)
Dept: ENDOCRINOLOGY | Facility: CLINIC | Age: 49
End: 2024-07-16
Payer: COMMERCIAL

## 2024-07-16 VITALS — HEIGHT: 64 IN | WEIGHT: 182 LBS | BODY MASS INDEX: 31.07 KG/M2

## 2024-07-16 DIAGNOSIS — E66.9 OBESITY: ICD-10-CM

## 2024-07-16 DIAGNOSIS — Z71.3 NUTRITIONAL COUNSELING: Primary | ICD-10-CM

## 2024-07-16 PROCEDURE — 97802 MEDICAL NUTRITION INDIV IN: CPT | Mod: 93

## 2024-07-16 PROCEDURE — 99207 PR NO CHARGE LOS: CPT | Mod: 93

## 2024-07-16 ASSESSMENT — PATIENT HEALTH QUESTIONNAIRE - PHQ9: SUM OF ALL RESPONSES TO PHQ QUESTIONS 1-9: 9

## 2024-07-16 ASSESSMENT — PAIN SCALES - GENERAL: PAINLEVEL: NO PAIN (0)

## 2024-07-16 NOTE — NURSING NOTE
Current patient location: [address] [place] [id number]    Is the patient currently in the state of [place]? YES    Visit mode:TELEPHONE    If the visit is dropped, the patient can be reconnected by: TELEPHONE VISIT: Phone number: [phone]    Will anyone else be joining the visit? NO  (If patient encounters technical issues they should call [phone]    How would you like to obtain your AVS? MyChart    Are changes needed to the allergy or medication list? Pt stated no changes to allergies and Pt stated no med changes    Are refills needed on medications prescribed by this physician? NO    Reason for visit: Consult    Gaby Douglas VVF    High phq2&9, decline triage, pt state pt is seeing someone for depression

## 2024-07-16 NOTE — LETTER
"2024       RE: Mireille Walls  [address]  [place] [facility] [address]     Dear Colleague,    Thank you for referring your patient, Mireille Walls, to the [facility]. Please see a copy of my visit note below.    Phone-Visit Details    Type of service:  Phone Visit    Phone call duration: 36 minutes    Distant Location (provider location):  Offsite (providers home) [facility]       New Weight Management Nutrition Consultation    Mireille Walls is a 49 year old female presents today for new weight management nutrition consultation.  Patient referred by Christopher Plata MD on 2024.    Patient with Co-morbidities of obesity includin/27/2024     4:28 PM   --   I have the following health issues associated with obesity High Cholesterol   I have the following symptoms associated with obesity Depression    Fatigue         Anthropometrics:  Estimated body mass index is 31.24 kg/m  as calculated from the following:    Height as of 24: 1.626 m (5' 4\").    Weight as of 24: 82.6 kg (182 lb).    Medications for Weight Loss:  Wegovy (plan A)- needs ultrasound for fatty liver disease in order to get coverage. Reached out to Dr Plata about this.    Compounded Semaglutide (plan B)    NUTRITION HISTORY  Food allergies: None  Food intolerances: Lactose intolerance hx (has not had issues since taking probiotic)  Vitamin/Mineral Supplements: Probiotics (used to have IBD)  Previous methods of diet modification for weight loss: Watching portions/kcals, exercise, liquid diets, medications  RD before: Has seen weight management in the past    Had to move in with mother a few years ago, not big on cooking but has to prepare meals for mother. Dealing with chronic major depression, sleeping a lot when not working, fatigued/tired.     Has evening snack at work but " also getting fast food on way home from work then going to bed right after. Wanting to reduce this or know what to eat at dinner to help her stay full for longer.    Sleeps in until noon because she doesn't need to work until 3.    Diet recall:   B- Special K cereal with sweet roll  D- Subway (1/2 sandwich- steak and cheese or meatball sub) with harvest cheddar chips, or sandwich (turkey/ham) with applesauce, or tuna casserole/beef and macaroni casserole, frozen quiche, lasagna  Evening snack: Taco bell or Torres's (burrito, Big Mac with fries), baked goods    Hydration: Water with zero sugar koolaid for flavoring (20 oz per day), light iced tea with dinner, in evening has 1/2 glass skim milk. At work brings 20 oz thermos and will have 1.5 during shift. 10 oz diet mountain dew on days that she works. 20-30 oz total water depending on work.    Physical Activity:  On working days will do 10,000-12,000 steps (3 days per week). Low energy with depression, has tried adderall and not able to tolerate that. Has to help her mom around the house when awake. Feels that she doesn't have enough energy to do activities she enjoys which she would do before adding in physical activity.    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        6/27/2024     4:28 PM   Diet Recall Review with Patient   If you do eat breakfast, what types of food do you eat? Cereal and pastry   If you do eat supper, what types of food do you typically eat? Subway steak and cheese, beef, chicken, pasta, burgers, fries   If you do snack, what types of food do you typically eat? Pastry, cookies, ice cream   How many glasses of juice do you drink in a typical day? 0   How many of glasses of milk do you drink in a typical day? 2   If you do drink milk, what type? Skim   How many 8oz glasses of sugar containing drinks such as Jhon-Aid/sweet tea do you drink in a day? 0   How many cans/bottles of  sugar pop/soda/tea/sports drinks do you drink in a day? 1   How many cans/bottles of diet pop/soda/tea or sports drink do you drink in a day? 1   How often do you have a drink of alcohol? Never           6/27/2024     4:28 PM   Eating Habits   Generally, my meals include foods like these bread, pasta, rice, potatoes, corn, crackers, sweet dessert, pop, or juice Almost Everyday   Generally, my meals include foods like these fried meats, brats, burgers, french fries, pizza, cheese, chips, or ice cream A Few Times a Week   Eat fast food (like McDonalds, Burger Thierry, Taco Bell) A Few Times a Week   Eat at a buffet or sit-down restaurant Never   Eat most of my meals in front of the TV or computer A Few Times a Week   Often skip meals, eat at random times, have no regular eating times A Few Times a Week   Rarely sit down for a meal but snack or graze throughout Never   Eat extra snacks between meals A Few Times a Week   Eat most of my food at the end of the day Everyday   Eat in the middle of the night or wake up at night to eat A Few Times a Week   Eat extra snacks to prevent or correct low blood sugar Never   Eat to prevent acid reflux or stomach pain Never   Worry about not having enough food to eat Never   I eat when I am depressed A Few Times a Week   I eat when I am stressed Once a Week   I eat when I am bored Less Than Weekly   I eat when I am anxious Less Than Weekly   I eat when I am happy or as a reward Once a Week   I feel hungry all the time even if I just have eaten Never   Feeling full is important to me Everyday   I finish all the food on my plate even if I am already full Almost Everyday   I can't resist eating delicious food or walk past the good food/smell Everyday   I eat/snack without noticing that I am eating Once a Week   I eat when I am preparing the meal Less Than Weekly   I eat more than usual when I see others eating A Few Times a Week   I have trouble not eating sweets, ice cream, cookies, or  chips if they are around the house Everyday   I think about food all day A Few Times a Week   What foods, if any, do you crave? Sweets/Candy/Chocolate           6/27/2024     4:28 PM   Amount of Food   I feel out of control when eating Weekly   I eat a large amount of food, like a loaf of bread, a box of cookies, a pint/quart of ice cream, all at once Never   I eat a large amount of food even when I am not hungry Weekly   I eat rapidly Weekly   I eat alone because I feel embarrassed and do not want others to see how much I have eaten Weekly   I eat until I am uncomfortably full Weekly   I feel bad, disgusted, or guilty after I overeat Weekly           6/27/2024     4:28 PM   Activity/Exercise History   How much of a typical 12 hour day do you spend sitting? Half the Day   How much of a typical 12 hour day do you spend lying down? Half the Day   How much of a typical day do you spend walking/standing? Less Than Half the Day   How many hours (not including work) do you spend on the TV/Video Games/Computer/Tablet/Phone? 2-3 Hours   How many times a week are you active for the purpose of exercise? Never   What keeps you from being more active? Lack of Time    Too tired   How many total minutes do you spend doing some activity for the purpose of exercising when you exercise? None       Nutrition Prescription  Recommended energy/nutrient modification.    Nutrition Diagnosis  Obesity r/t long history of positive energy balance aeb BMI >30.    Nutrition Intervention  Reviewed current dietary habits and pts history   Discussed long-term goals pt hopes to accomplish in RD appointments  Co-developed goals to work towards.   Answered pt questions  Coordination of care   Nutrition education   AVS and handouts via [employer]      Expected Engagement: good    Nutrition Goals  1) Aim to increase water intake to 40 oz/day  2) Look through resources below, can make more specific goals at next appointment.    Low Calorie Frozen Meal  "Options:  Healthy Choice Power Bowls  Lean Cuisine  Smart Ones  Paulo Dominguez     Dietary Fiber  Soluble fiber is recommended for both diarrhea/constipation.  Foods rich in insoluble fiber are best for constipation but not diarrhea.     Insoluble fiber adds bulk to your stool and helps food pass more quickly through the stomach and intestines. It can have a laxative effect  Soluble fiber attracts water and forms a gel-like substance with food as it s digested. Aids in diarrhea by helping pull water.     Insoluble fiber examples: wheat bran, brown rice, flaxseed, serenity seed, cauliflower, zuchinni, green beans, dark leafy greens, carrots, beets, radishes, fruit skins, intact whole grains, beans/peas    Soluble fiber examples: brussel sprouts, oats, beans, peas, apples, pears, berries, carrots, sweet potatoes, oranges, nuts, seeds    *Note: some food are rich in both soluble and insoluble fiber     Healthy Recipe Resources:  Books:  \"The Volumetrics Eating Plan\" by Tanisha Bhandari, Ph.D.  \"Cooking that Counts\" by editors of [place]  \"Calorie Smart Meals\" cookbook by Better Homes and [employer] (200-500 calorie meals)    Websites:  www.Kudos Knowledge  https://www.Preclick/  www.Kazeon.iVideosongs  https://www.diabetesfoodhub.org/all-recipes.html  Https://www.choosemyplate.gov/myplatekitchen  Recipes by Season: https://snaped.fns.usda.gov/recipes-menus   Video Meal Prep: Https://www.youtube.com/c/raquel   Meal Plans: Eatthismuch.com   DASH Diet: https://www.nhlbi.nih.gov/education/dash-eating-plan  Whole Grains Anna: https://wholegrainscouncil.org/recipes    Apps:  MealCosmopolit Home glenna (or website, mealime.com)   SpendSmartEatSmart     Tips for eating out:  Skip fried foods.   Aim for balance - starch, protein plus fruit and veggie   Look for dishes that are cooked without cheese, cream or butter, or ask for these items on the side.  Pick entrees using lean protein - chicken, turkey, fish, seafood, eggs, " loin/tenderloin cuts of red meat   Pick salads for entree that have nuts and seeds, and/or lean protein   Choose vegetarian choices that don t have too much cheese.  Share an entree, or pack up half right away  Avoid alcohol. Choose water or unsweetened tea over pop and juice/lemonade.      https://www.helpguide.org/articles/healthy-eating/healthier-fast-food.htm    Healthier Meals Available at Chain Restaurants:   https://www.aWhere/healthy-meals-chain-restaurants  https://www.V[employer] Group/Stratus5/restaurants/2012/07/healthiest-fast-food-menu-items     Additional Resources:  The Plate Method  [email or web address]    Protein Sources   [email or web address]://[email or web address]     Carbohydrates  [email or web address]     Mindful Eating  [email or web address]://[email or web address]     Summary of Volumetrics Eating Plan  [email or web address]     Follow-Up:  PRN    Time spent with patient: 36 minutes.  NITA Mcdowell, RD, LD  Clinic #: [phone]

## 2024-07-16 NOTE — PATIENT INSTRUCTIONS
"Koko Kendrick,    If you need to schedule an appointment, please call [phone].    Thank you,    Juliana Crane, NITA, RD, LD    Nutrition Goals:  1) Aim to increase water intake to 40 oz/day  2) Look through resources below, can make more specific goals at next appointment.    Low Calorie Frozen Meal Options:  Healthy Choice Power Bowls  Lean Cuisine  Smart Ones  Paulo Gamezgary Dominguez     Dietary Fiber  Soluble fiber is recommended for both diarrhea/constipation.  Foods rich in insoluble fiber are best for constipation but not diarrhea.     Insoluble fiber adds bulk to your stool and helps food pass more quickly through the stomach and intestines. It can have a laxative effect  Soluble fiber attracts water and forms a gel-like substance with food as it s digested. Aids in diarrhea by helping pull water.     Insoluble fiber examples: wheat bran, brown rice, flaxseed, serenity seed, cauliflower, zuchinni, green beans, dark leafy greens, carrots, beets, radishes, fruit skins, intact whole grains, beans/peas    Soluble fiber examples: brussel sprouts, oats, beans, peas, apples, pears, berries, carrots, sweet potatoes, oranges, nuts, seeds    *Note: some food are rich in both soluble and insoluble fiber     Healthy Recipe Resources:  Books:  \"The Volumetrics Eating Plan\" by Tanisha Bhandari, Ph.D.  \"Cooking that Counts\" by editors of Panelfly  \"Calorie Smart Meals\" cookbook by Better Homes and Gardens (200-500 calorie meals)    Websites:  www.IRIS-RFID  https://www.Arroweye Solutions/  www.Victrix.org  https://www.diabetesfoodhub.org/all-recipes.html  Https://www.choosemyplate.gov/myplatekitchen  Recipes by Season: https://snaped.fns.usda.gov/recipes-menus   Video Meal Prep: Https://www.CasengotBrandtone.com/c/raquel   Meal Plans: EatthiCareWire.com   DASH Diet: https://www.nhlbi.nih.gov/education/dash-eating-plan  Whole Grains Gordo: https://Belgian Beer Discoverycouncil.org/recipes    Apps:  [employer] glenan (or website, Arkeia Software.com) "   Maricarmen     Tips for eating out:  Skip fried foods.   Aim for balance - starch, protein plus fruit and veggie   Look for dishes that are cooked without cheese, cream or butter, or ask for these items on the side.  Pick entrees using lean protein - chicken, turkey, fish, seafood, eggs, loin/tenderloin cuts of red meat   Pick salads for entree that have nuts and seeds, and/or lean protein   Choose vegetarian choices that don t have too much cheese.  Share an entree, or pack up half right away  Avoid alcohol. Choose water or unsweetened tea over pop and juice/lemonade.      https://www.helpguide.org/articles/healthy-eating/healthier-fast-food.htm    Healthier Meals Available at Chain Restaurants:   https://www.RacerTimes/healthy-meals-chain-restaurants  https://www.globalscholar.com/EnSoleaEgnyte/restaurants/2012/07/healthiest-fast-food-menu-items     Additional Resources:  The Plate Method  [email or web address]    Protein Sources   http://[email or web address]/[email or web address]     Carbohydrates  [email or web address]     Mindful Eating  http://[email or web address]     Summary of Volumetrics Eating Plan  [email or web address]

## 2024-07-18 ENCOUNTER — LAB (OUTPATIENT)
Dept: LAB | Facility: CLINIC | Age: 49
End: 2024-07-18
Payer: COMMERCIAL

## 2024-07-18 DIAGNOSIS — R53.83 FATIGUE, UNSPECIFIED TYPE: ICD-10-CM

## 2024-07-18 DIAGNOSIS — R79.89 ELEVATED TSH: ICD-10-CM

## 2024-07-18 DIAGNOSIS — E66.811 CLASS 1 OBESITY DUE TO EXCESS CALORIES WITH SERIOUS COMORBIDITY IN ADULT, UNSPECIFIED BMI: ICD-10-CM

## 2024-07-18 DIAGNOSIS — E66.09 CLASS 1 OBESITY DUE TO EXCESS CALORIES WITH SERIOUS COMORBIDITY IN ADULT, UNSPECIFIED BMI: ICD-10-CM

## 2024-07-18 LAB
ALBUMIN SERPL BCG-MCNC: 4.3 G/DL (ref 3.5–5.2)
ALP SERPL-CCNC: 66 U/L (ref 40–150)
ALT SERPL W P-5'-P-CCNC: 13 U/L (ref 0–50)
ANION GAP SERPL CALCULATED.3IONS-SCNC: 10 MMOL/L (ref 7–15)
AST SERPL W P-5'-P-CCNC: 21 U/L (ref 0–45)
BILIRUB SERPL-MCNC: 0.2 MG/DL
BUN SERPL-MCNC: 16 MG/DL (ref 6–20)
CALCIUM SERPL-MCNC: 9.4 MG/DL (ref 8.8–10.4)
CHLORIDE SERPL-SCNC: 105 MMOL/L (ref 98–107)
CHOLEST SERPL-MCNC: 253 MG/DL
CREAT SERPL-MCNC: 0.74 MG/DL (ref 0.51–0.95)
EGFRCR SERPLBLD CKD-EPI 2021: >90 ML/MIN/1.73M2
FASTING STATUS PATIENT QL REPORTED: YES
FASTING STATUS PATIENT QL REPORTED: YES
GLUCOSE SERPL-MCNC: 90 MG/DL (ref 70–99)
HCO3 SERPL-SCNC: 23 MMOL/L (ref 22–29)
HDLC SERPL-MCNC: 51 MG/DL
LDLC SERPL CALC-MCNC: 140 MG/DL
NONHDLC SERPL-MCNC: 202 MG/DL
POTASSIUM SERPL-SCNC: 4.3 MMOL/L (ref 3.4–5.3)
PROT SERPL-MCNC: 7 G/DL (ref 6.4–8.3)
SODIUM SERPL-SCNC: 138 MMOL/L (ref 135–145)
T4 FREE SERPL-MCNC: 0.99 NG/DL (ref 0.9–1.7)
TRIGL SERPL-MCNC: 309 MG/DL
TSH SERPL DL<=0.005 MIU/L-ACNC: 6.3 UIU/ML (ref 0.3–4.2)

## 2024-07-18 PROCEDURE — 86376 MICROSOMAL ANTIBODY EACH: CPT

## 2024-07-18 PROCEDURE — 84439 ASSAY OF FREE THYROXINE: CPT

## 2024-07-18 PROCEDURE — 36415 COLL VENOUS BLD VENIPUNCTURE: CPT

## 2024-07-18 PROCEDURE — 80053 COMPREHEN METABOLIC PANEL: CPT

## 2024-07-18 PROCEDURE — 80061 LIPID PANEL: CPT

## 2024-07-18 PROCEDURE — 84443 ASSAY THYROID STIM HORMONE: CPT

## 2024-07-19 LAB — THYROPEROXIDASE AB SERPL-ACNC: <10 IU/ML

## 2024-07-25 ENCOUNTER — OFFICE VISIT (OUTPATIENT)
Dept: DERMATOLOGY | Facility: CLINIC | Age: 49
End: 2024-07-25
Payer: COMMERCIAL

## 2024-07-25 ENCOUNTER — TELEPHONE (OUTPATIENT)
Dept: ENDOCRINOLOGY | Facility: CLINIC | Age: 49
End: 2024-07-25

## 2024-07-25 DIAGNOSIS — D49.2 NEOPLASM OF UNSPECIFIED BEHAVIOR OF BONE, SOFT TISSUE, AND SKIN: ICD-10-CM

## 2024-07-25 DIAGNOSIS — D22.9 MULTIPLE NEVI: ICD-10-CM

## 2024-07-25 DIAGNOSIS — Z12.83 ENCOUNTER FOR SCREENING FOR MALIGNANT NEOPLASM OF SKIN: ICD-10-CM

## 2024-07-25 DIAGNOSIS — B35.1 ONYCHOMYCOSIS: Primary | ICD-10-CM

## 2024-07-25 DIAGNOSIS — L82.1 SEBORRHEIC KERATOSES: ICD-10-CM

## 2024-07-25 DIAGNOSIS — L81.4 LENTIGINES: ICD-10-CM

## 2024-07-25 DIAGNOSIS — D18.01 CHERRY ANGIOMA: ICD-10-CM

## 2024-07-25 PROCEDURE — 88304 TISSUE EXAM BY PATHOLOGIST: CPT | Performed by: DERMATOLOGY

## 2024-07-25 PROCEDURE — 11103 TANGNTL BX SKIN EA SEP/ADDL: CPT | Performed by: PHYSICIAN ASSISTANT

## 2024-07-25 PROCEDURE — 88305 TISSUE EXAM BY PATHOLOGIST: CPT | Performed by: DERMATOLOGY

## 2024-07-25 PROCEDURE — 11104 PUNCH BX SKIN SINGLE LESION: CPT | Performed by: PHYSICIAN ASSISTANT

## 2024-07-25 PROCEDURE — 99214 OFFICE O/P EST MOD 30 MIN: CPT | Mod: 25 | Performed by: PHYSICIAN ASSISTANT

## 2024-07-25 RX ORDER — EFINACONAZOLE 100 MG/ML
SOLUTION TOPICAL AT BEDTIME
Qty: 4 ML | Refills: 11 | Status: SHIPPED | OUTPATIENT
Start: 2024-07-25 | End: 2024-07-26

## 2024-07-25 NOTE — PROGRESS NOTES
[facility] Dermatology Note  Encounter Date: Jul 25, 2024  Office Visit     Reviewed patients past medical history and pertinent chart review prior to patients visit today.     Dermatology Problem List:  1. Seborrheic dermatitis: Ketoconazole shampoo 3 times weekly  2. Nummular dermatitis of the central forehead with a negative KOH scraping on July 11, 2019: Hydrocortisone 2.5% cream twice daily for 2 to 3 weeks  3. Acne vulgaris, hormonal.  - Spironolactone 100 mg every day, started 1/30/20, increased to 150-200 mg 4/23/2020, current 100mg  - Consider isotretinoin in future if worsening  4. Allergic contact dermatitis  - Patch referral 6/9/23  - Prior rx: [facility] 0.1%  ____________________________________________    Assessment & Plan:     # Neoplasm of uncertain behavior:  left chest  DDx includes AK vs NMSC. Shave biopsy today.    Procedure Note: Biopsy by shave technique  The risks and benefits of the procedure were described to the patient. These include but are not limited to bleeding, infection, scar, incomplete removal, and non-diagnostic biopsy. Verbal informed consent was obtained. The above site(s) was cleansed with an alcohol pad and injected with 1% lidocaine with epinephrine. Once anesthesia was obtained, a biopsy(ies) was performed with Gilette blade. The tissue(s) was placed in a labeled container(s) with formalin and sent to pathology. Hemostasis was achieved with aluminum chloride. Vaseline and a bandage were applied to the wound(s). The patient tolerated the procedure well and was given post biopsy care instructions.    # Neoplasm of uncertain behavior:  right vertex scalp  DDx includes epidermal inclusion cyst vs other. Punch biopsy today.    Procedure Note: Biopsy by punch technique  The risks and benefits of the procedure were described to the patient. These include but are not limited to bleeding, infection, scar, incomplete removal, and non-diagnostic biopsy. Verbal informed  consent was obtained. The right vertex scalp was cleansed with an alcohol pad and injected with lidocaine with epinephrine. Once anesthesia was obtained, a 6 mm punch biopsy was performed. The tissue was placed in a labeled container with formalin and sent to pathology. The site was closed with using 4-0 prolene. Vaseline and a bandage were applied to the wound. The patient tolerated the procedure well and was given post biopsy care instructions.     # Onychomycosis   - Jublia was prescribed and should be applied to the infected nail and surrounding skin once daily. After 7 days the nail lacquer should be removed with alcohol and a fresh coat applied. Patient was advised used of the nail lacquer should continue until nail clearance is achieved or up to 48 weeks.    # Multiple nevi, trunk and extremities  # Solar lentigines  - No concerning features on dermoscopy. We discussed the importance of self exams at home. ABCDE criteria and importance of photoprotection reviewed.     # Cherry angiomas  # Seborrheic keratoses  - We discussed the benign nature of the skin lesions. No treatment required. Continued observation recommended. Follow up with any concerns.      Follow-up:  Annual for follow up full body skin exam, as needed for new or changing lesions or new concerns    All risks, benefits and alternatives were discussed with patient.  Patient is in agreement and understands the assessment and plan.  All questions were answered.  Ayesha Chung PA-C  [facility]    ____________________________________________    CC: Skin Check ([facility] [place]    HPI:  Ms. Mireille Walls is a(n) 49 year old female who presents today as a return patient for a full body skin cancer screening. The patient requests evaluation of multiple rough spots on the chest and left arm. She also recently developed a bump on the scalp. Finally, she reports that toenail fungus improved with Jublia, but has since returned. No other specific  cutaneous concerns today. The patient reports trying to be diligent with photoprotection.    Physical Exam:  Vitals: There were no vitals taken for this visit.  SKIN: Total skin excluding the genitalia areas was performed. The exam included the scalp, face, neck, bilateral arms, chest, back, abdomen, bilateral legs, digits, mons pubis, buttocks, and nails.   - Artis II.  - The right vertex scalp demonstrates a 0.5 cm well demarcated, mobile subcutaneous nodule with punctum, consistent with an epidermal inclusion cyst.   - Involving the left upper chest is a 0.6 cm pink papule with adherent scale.   - Multiple tan/brown macules and papules scattered throughout exam, consistent with benign nevi. No concerning features on dermoscopy.   - Scattered tan, homogenous macules scattered on sun exposed skin, consistent with solar lentigines.   - Scattered waxy, stuck on appearing papules and patches, consistent with seborrheic keratoses.    - Several 1-2 mm red dome shaped symmetric papules, consistent with cherry angiomas.   - Yellowing with subungual debris involving the toenail(s).      Medications:  Current Outpatient Medications   Medication Sig Dispense Refill    buPROPion (WELLBUTRIN XL) 150 MG 24 hr tablet Take 450 mg by mouth every morning       CALCIUM PO Take  by mouth.      Cholecalciferol (D3 VITAMIN PO)       COD LIVER OIL PO Take  by mouth.      ketoconazole (NIZORAL) 2 % external shampoo APPLY TO AFFECTED AREA(S) THREE TIMES WEEKLY 120 mL 11    loratadine (CLARITIN) 10 MG tablet Take 10 mg by mouth daily      Multiple Vitamins-Minerals (MULTIPLE VITAMINS/WOMENS PO) Take  by mouth.      Probiotic Product (PROBIOTIC PO) Take  by mouth.      rizatriptan (MAXALT-MLT) 5 MG ODT Take 1-2 tablets (5-10 mg) by mouth at onset of headache for migraine May repeat dose in 2 hours.  Do not exceed 30 mg in 24 hours 10 tablet 1    Semaglutide-Weight Management (WEGOVY) 0.25 MG/0.5ML pen Inject 0.25 mg Subcutaneous once  a week 2 mL 5    spironolactone (ALDACTONE) 50 MG tablet TAKE TWO TABLETS BY MOUTH ONCE DAILY 180 tablet 0    vortioxetine (TRINTELLIX) 10 MG tablet Take 1 tablet (10 mg) by mouth daily       No current facility-administered medications for this visit.      Past Medical History:   Patient Active Problem List   Diagnosis    Depression    OCD (obsessive compulsive disorder)    IBS (irritable bowel syndrome)    Migraines    Hyperlipidemia    Contraception -- abstinence    Plantar fascial fibromatosis    Acne    Hair loss    Anxiety associated with depression    ALIVIA (obstructive sleep apnea)    Essential hypertension    Factor 5 Leiden mutation, heterozygous (H24)    Chronic fatigue    Excessive sleepiness    Class 1 obesity due to excess calories with serious comorbidity and body mass index (BMI) of 31.0 to 31.9 in adult     Past Medical History:   Diagnosis Date    Abnormal Pap smear 2006?    dysplasia  X 1; paps OK since then...    Arm DVT (deep venous thromboembolism), acute (H) 2008    hx with phlebitis in IVs after a surgery    Breast disorder 2016    2D mammo, then 3D & u/s to r/o = scar tissue from reduction    Cholesterol serum elevated     runs in family    Complication of anesthesia 2000?, 2016    very slow to awake from both gen. anesth. & conscious sedati    Depression     Depressive disorder 1996    under care of a psychiatrist    Encounter for insertion of mirena IUD 2011    D/C'd w increased acne    Hypertension 2017&2018    jamin. during exercise, caused by stimulant for depression?    IBS (irritable bowel syndrome) 2002    under control, better with probiotic    Menarche age 15    cycles q 1-2 months Xs 3-4 d w bad cramps    Migraines 2008    a lot better now, may be stress related    OCD (obsessive compulsive disorder)     Seasonal allergies        CC Referred Self, MD  No address on file on close of this encounter.

## 2024-07-25 NOTE — LETTER
7/25/2024      Mireille Walls  [address]  [place] [address]      Dear Colleague,    Thank you for referring your patient, Mireille Walls, to the [facility]. Please see a copy of my visit note below.    [facility] Dermatology Note  Encounter Date: Jul 25, 2024  Office Visit     Reviewed patients past medical history and pertinent chart review prior to patients visit today.     Dermatology Problem List:  1. Seborrheic dermatitis: Ketoconazole shampoo 3 times weekly  2. Nummular dermatitis of the central forehead with a negative KOH scraping on July 11, 2019: Hydrocortisone 2.5% cream twice daily for 2 to 3 weeks  3. Acne vulgaris, hormonal.  - Spironolactone 100 mg every day, started 1/30/20, increased to 150-200 mg 4/23/2020, current 100mg  - Consider isotretinoin in future if worsening  4. Allergic contact dermatitis  - Patch referral 6/9/23  - Prior rx: [facility] 0.1%  ____________________________________________    Assessment & Plan:     # Neoplasm of uncertain behavior:  left chest  DDx includes AK vs NMSC. Shave biopsy today.    Procedure Note: Biopsy by shave technique  The risks and benefits of the procedure were described to the patient. These include but are not limited to bleeding, infection, scar, incomplete removal, and non-diagnostic biopsy. Verbal informed consent was obtained. The above site(s) was cleansed with an alcohol pad and injected with 1% lidocaine with epinephrine. Once anesthesia was obtained, a biopsy(ies) was performed with Gilette blade. The tissue(s) was placed in a labeled container(s) with formalin and sent to pathology. Hemostasis was achieved with aluminum chloride. Vaseline and a bandage were applied to the wound(s). The patient tolerated the procedure well and was given post biopsy care instructions.    # Neoplasm of uncertain behavior:  right vertex scalp  DDx includes epidermal inclusion cyst vs other. Punch biopsy  today.    Procedure Note: Biopsy by punch technique  The risks and benefits of the procedure were described to the patient. These include but are not limited to bleeding, infection, scar, incomplete removal, and non-diagnostic biopsy. Verbal informed consent was obtained. The right vertex scalp was cleansed with an alcohol pad and injected with lidocaine with epinephrine. Once anesthesia was obtained, a 6 mm punch biopsy was performed. The tissue was placed in a labeled container with formalin and sent to pathology. The site was closed with using 4-0 prolene. Vaseline and a bandage were applied to the wound. The patient tolerated the procedure well and was given post biopsy care instructions.     # Onychomycosis   - Jublia was prescribed and should be applied to the infected nail and surrounding skin once daily. After 7 days the nail lacquer should be removed with alcohol and a fresh coat applied. Patient was advised used of the nail lacquer should continue until nail clearance is achieved or up to 48 weeks.    # Multiple nevi, trunk and extremities  # Solar lentigines  - No concerning features on dermoscopy. We discussed the importance of self exams at home. ABCDE criteria and importance of photoprotection reviewed.     # Cherry angiomas  # Seborrheic keratoses  - We discussed the benign nature of the skin lesions. No treatment required. Continued observation recommended. Follow up with any concerns.      Follow-up:  Annual for follow up full body skin exam, as needed for new or changing lesions or new concerns    All risks, benefits and alternatives were discussed with patient.  Patient is in agreement and understands the assessment and plan.  All questions were answered.  Ayesha Chung PA-C  [facility] Dermatology    ____________________________________________    CC: Skin Check ([facility])    HPI:  Ms. Mireille Walls is a(n) 49 year old female who presents today as a return patient for a full body skin cancer  screening. The patient requests evaluation of multiple rough spots on the chest and left arm. She also recently developed a bump on the scalp. Finally, she reports that toenail fungus improved with Jublia, but has since returned. No other specific cutaneous concerns today. The patient reports trying to be diligent with photoprotection.    Physical Exam:  Vitals: There were no vitals taken for this visit.  SKIN: Total skin excluding the genitalia areas was performed. The exam included the scalp, face, neck, bilateral arms, chest, back, abdomen, bilateral legs, digits, mons pubis, buttocks, and nails.   - Artis II.  - The right vertex scalp demonstrates a 0.5 cm well demarcated, mobile subcutaneous nodule with punctum, consistent with an epidermal inclusion cyst.   - Involving the left upper chest is a 0.6 cm pink papule with adherent scale.   - Multiple tan/brown macules and papules scattered throughout exam, consistent with benign nevi. No concerning features on dermoscopy.   - Scattered tan, homogenous macules scattered on sun exposed skin, consistent with solar lentigines.   - Scattered waxy, stuck on appearing papules and patches, consistent with seborrheic keratoses.    - Several 1-2 mm red dome shaped symmetric papules, consistent with cherry angiomas.   - Yellowing with subungual debris involving the toenail(s).      Medications:  Current Outpatient Medications   Medication Sig Dispense Refill     buPROPion (WELLBUTRIN XL) 150 MG 24 hr tablet Take 450 mg by mouth every morning        CALCIUM PO Take  by mouth.       Cholecalciferol (D3 VITAMIN PO)        COD LIVER OIL PO Take  by mouth.       ketoconazole (NIZORAL) 2 % external shampoo APPLY TO AFFECTED AREA(S) THREE TIMES WEEKLY 120 mL 11     loratadine (CLARITIN) 10 MG tablet Take 10 mg by mouth daily       Multiple Vitamins-Minerals (MULTIPLE VITAMINS/WOMENS PO) Take  by mouth.       Probiotic Product (PROBIOTIC PO) Take  by mouth.       rizatriptan  (MAXALT-MLT) 5 MG ODT Take 1-2 tablets (5-10 mg) by mouth at onset of headache for migraine May repeat dose in 2 hours.  Do not exceed 30 mg in 24 hours 10 tablet 1     Semaglutide-Weight Management (WEGOVY) 0.25 MG/0.5ML pen Inject 0.25 mg Subcutaneous once a week 2 mL 5     spironolactone (ALDACTONE) 50 MG tablet TAKE TWO TABLETS BY MOUTH ONCE DAILY 180 tablet 0     vortioxetine (TRINTELLIX) 10 MG tablet Take 1 tablet (10 mg) by mouth daily       No current facility-administered medications for this visit.      Past Medical History:   Patient Active Problem List   Diagnosis     Depression     OCD (obsessive compulsive disorder)     IBS (irritable bowel syndrome)     Migraines     Hyperlipidemia     Contraception -- abstinence     Plantar fascial fibromatosis     Acne     Hair loss     Anxiety associated with depression     ALIVIA (obstructive sleep apnea)     Essential hypertension     Factor 5 Leiden mutation, heterozygous (H24)     Chronic fatigue     Excessive sleepiness     Class 1 obesity due to excess calories with serious comorbidity and body mass index (BMI) of 31.0 to 31.9 in adult     Past Medical History:   Diagnosis Date     Abnormal Pap smear 2006?    dysplasia  X 1; paps OK since then...     Arm DVT (deep venous thromboembolism), acute (H) 2008    hx with phlebitis in IVs after a surgery     Breast disorder 2016    2D mammo, then 3D & u/s to r/o = scar tissue from reduction     Cholesterol serum elevated     runs in family     Complication of anesthesia 2000?, 2016    very slow to awake from both gen. anesth. & conscious sedati     Depression      Depressive disorder 1996    under care of a psychiatrist     Encounter for insertion of mirena IUD 2011    D/C'd w increased acne     Hypertension 2017&2018    jamin. during exercise, caused by stimulant for depression?     IBS (irritable bowel syndrome) 2002    under control, better with probiotic     Menarche age 15    cycles q 1-2 months Xs 3-4 d w bad cramps      Migraines 2008    a lot better now, may be stress related     OCD (obsessive compulsive disorder)      Seasonal allergies        CC Referred Self, MD  No address on file on close of this encounter.      Again, thank you for allowing me to participate in the care of your patient.        Sincerely,        Ayesha Chung PA-C

## 2024-07-25 NOTE — TELEPHONE ENCOUNTER
General Call    Contacts       Contact Date/Time Type Contact Phone/Fax    07/25/2024 02:52 PM CDT Phone (Incoming) Mireille Walls (Self) [phone] (M)          Reason for Call:  pt was expecting order to be put in for 'ultrasound' and a phone call to sched.   Please call her.    Could we send this information to you in [employer] or would you prefer to receive a phone call?:   Patient would prefer a phone call   Okay to leave a detailed message?: Yes at Cell number on file:    Telephone Information:   Mobile [phone]

## 2024-07-25 NOTE — PATIENT INSTRUCTIONS
Patient Education       Proper skin care from [facility]:    -Eliminate harsh soaps as they strip the natural oils from the skin, often resulting in dry itchy skin ( i.e. Dial, Zest, Polish Spring)  -Use mild soaps such as Cetaphil or Dove Sensitive Skin in the shower. You do not need to use soap on arms, legs, and trunk every time you shower unless visibly soiled.   -Avoid hot or cold showers.  -After showering, lightly dry off and apply moisturizing within 2-3 minutes. This will help trap moisture in the skin.   -Aggressive use of a moisturizer at least 1-2 times a day to the entire body (including -Vanicream, Cetaphil, Aquaphor or Cerave) and moisturize hands after every washing.  -We recommend using moisturizers that come in a tub that needs to be scooped out, not a pump. This has more of an oil base. It will hold moisture in your skin much better than a water base moisturizer. The above recommended are non-pore clogging.      Wear a sunscreen with at least SPF 30 on your face, ears, neck and V of the chest daily. Wear sunscreen on other areas of the body if those areas are exposed to the sun throughout the day. Sunscreens can contain physical and/or chemical blockers. Physical blockers are less likely to clog pores, these include zinc oxide and titanium dioxide. Reapply every two hour and after swimming.     Sunscreen examples: https://www.ewg.org/sunscreen/    UV radiation  UVA radiation remains constant throughout the day and throughout the year. It is a longer wavelength than UVB and therefore penetrates deeper into the skin leading to immediate and delayed tanning, photoaging, and skin cancer. 70-80% of UVA and UVB radiation occurs between the hours of 10am-2pm.  UVB radiation  UVB radiation causes the most harmful effects and is more significant during the summer months. However, snow and ice can reflect UVB radiation leading to skin damage during the winter months as well. UVB radiation is  responsible for tanning, burning, inflammation, delayed erythema (pinkness), pigmentation (brown spots), and skin cancer.     I recommend self monthly full body exams and yearly full body exams with a dermatology provider. If you develop a new or changing lesion please follow up for examination. Most skin cancers are pink and scaly or pink and pearly. However, we do see blue/brown/black skin cancers.  Consider the ABCDEs of melanoma when giving yourself your monthly full body exam ( don't forget the groin, buttocks, feet, toes, etc). A-asymmetry, B-borders, C-color, D-diameter, E-elevation or evolving. If you see any of these changes please follow up in clinic. If you cannot see your back I recommend purchasing a hand held mirror to use with a larger wall mirror.       Checking for Skin Cancer  You can find cancer early by checking your skin each month. There are 3 kinds of skin cancer. They are melanoma, basal cell carcinoma, and squamous cell carcinoma. Doing monthly skin checks is the best way to find new marks or skin changes. Follow the instructions below for checking your skin.   The ABCDEs of checking moles for melanoma   Check your moles or growths for signs of melanoma using ABCDE:   Asymmetry: the sides of the mole or growth don t match  Border: the edges are ragged, notched, or blurred  Color: the color within the mole or growth varies  Diameter: the mole or growth is larger than 6 mm (size of a pencil eraser)  Evolving: the size, shape, or color of the mole or growth is changing (evolving is not shown in the images below)    Checking for other types of skin cancer  Basal cell carcinoma or squamous cell carcinoma have symptoms such as:     A spot or mole that looks different from all other marks on your skin  Changes in how an area feels, such as itching, tenderness, or pain  Changes in the skin's surface, such as oozing, bleeding, or scaliness  A sore that does not heal  New swelling or redness beyond  the border of a mole    Who s at risk?  Anyone can get skin cancer. But you are at greater risk if you have:   Fair skin, light-colored hair, or light-colored eyes  Many moles or abnormal moles on your skin  A history of sunburns from sunlight or tanning beds  A family history of skin cancer  A history of exposure to radiation or chemicals  A weakened immune system  If you have had skin cancer in the past, you are at risk for recurring skin cancer.   How to check your skin  Do your monthly skin checkups in front of a full-length mirror. Check all parts of your body, including your:   Head (ears, face, neck, and scalp)  Torso (front, back, and sides)  Arms (tops, undersides, upper, and lower armpits)  Hands (palms, backs, and fingers, including under the nails)  Buttocks and genitals  Legs (front, back, and sides)  Feet (tops, soles, toes, including under the nails, and between toes)  If you have a lot of moles, take digital photos of them each month. Make sure to take photos both up close and from a distance. These can help you see if any moles change over time.   Most skin changes are not cancer. But if you see any changes in your skin, call your doctor right away. Only he or she can diagnose a problem. If you have skin cancer, seeing your doctor can be the first step toward getting the treatment that could save your life.   [employer] last reviewed this educational content on 4/1/2019 2000-2020 [employer]. [address] [place] [address], [place] [address]. All rights reserved. This information is not intended as a substitute for professional medical care. Always follow your healthcare professional's instructions.       When should I call my doctor?  If you are worsening or not improving, please, contact us or seek urgent care as noted below.     Who should I call with questions (adults)?  [facility] (adult and pediatric): [phone]  [facility] (adult): [phone]  [facility] ([place], [place], [place] and [place]) [phone]  For urgent needs outside of business hours call the [facility] at [phone] and ask for the dermatology resident on call to be paged  If this is a medical emergency and you are unable to reach an ER, Call 911      If you need a prescription refill, please contact your pharmacy. Refills are approved or denied by our Physicians during normal business hours, Monday through Fridays  Per office policy, refills will not be granted if you have not been seen within the past year (or sooner depending on your child's condition)

## 2024-07-26 RX ORDER — EFINACONAZOLE 100 MG/ML
SOLUTION TOPICAL AT BEDTIME
Qty: 4 ML | Refills: 11 | Status: SHIPPED | OUTPATIENT
Start: 2024-07-26

## 2024-07-29 DIAGNOSIS — R94.6 ABNORMAL FINDING ON THYROID FUNCTION TEST: Primary | ICD-10-CM

## 2024-07-29 LAB
PATH REPORT.COMMENTS IMP SPEC: NORMAL
PATH REPORT.COMMENTS IMP SPEC: NORMAL
PATH REPORT.FINAL DX SPEC: NORMAL
PATH REPORT.GROSS SPEC: NORMAL
PATH REPORT.MICROSCOPIC SPEC OTHER STN: NORMAL
PATH REPORT.RELEVANT HX SPEC: NORMAL

## 2024-07-31 ENCOUNTER — TELEPHONE (OUTPATIENT)
Dept: DERMATOLOGY | Facility: CLINIC | Age: 49
End: 2024-07-31
Payer: COMMERCIAL

## 2024-07-31 NOTE — TELEPHONE ENCOUNTER
Patient Contact    Attempt # 1    Was call answered?  No.  Received no vm.  Will try later.    Pt can go to another [place] dermatology clinic.    Kavitha NG RN  [facility] [place]  [phone]

## 2024-07-31 NOTE — TELEPHONE ENCOUNTER
[facility] Call Center    Phone Message    May a detailed message be left on voicemail: yes     Reason for Call: Other: wants to know if she has to return to  to get her stitches removed or if she can go to another clinic. Okay to leave a .     Action Taken: Message routed to:  Other:  Skin    Travel Screening: Not Applicable

## 2024-08-01 ENCOUNTER — ALLIED HEALTH/NURSE VISIT (OUTPATIENT)
Dept: DERMATOLOGY | Facility: CLINIC | Age: 49
End: 2024-08-01
Payer: COMMERCIAL

## 2024-08-01 DIAGNOSIS — D49.2 NEOPLASM OF UNSPECIFIED BEHAVIOR OF BONE, SOFT TISSUE, AND SKIN: Primary | ICD-10-CM

## 2024-08-01 PROCEDURE — 99207 PR NO CHARGE NURSE ONLY: CPT | Performed by: DERMATOLOGY

## 2024-08-01 ASSESSMENT — PAIN SCALES - GENERAL: PAINLEVEL: NO PAIN (0)

## 2024-08-01 NOTE — TELEPHONE ENCOUNTER
Patient Contact    Attempt # 2    Was call answered?  No.  Left message on voicemail that pt can go to any [place] [facility] clinic to have sutures removed.  A [place] clinic will not remove them unless pt sees a provider being they were put in by speciality department.  Any questions to call nurse at [phone].    Kavitha NG RN  [facility] [place]  [phone]

## 2024-08-01 NOTE — NURSING NOTE
Mireille Walls comes into clinic today at the request of Ayesha Chung Ordering Provider for suture removal.      Dermatology Suture Removal Record  S: Mireille presents to the clinic today for  removal of sutures.  The patient has had the sutures in place for 7 days.    There has been no history of infection or drainage.    O: 3 sutures are seen located on the scalp.  The wound is healing well with no signs of infection.      A: Suture removal.    P:  All sutures were easily removed today.  Routine wound care discussed.  The patient will follow up as needed.     This service provided today was under the supervising provider of the day Dr Dowling, who was available if needed.    Chio Mota RN

## 2024-08-02 NOTE — TELEPHONE ENCOUNTER
Pt was seen at  derm clinic on Thursday August 1st for suture removal.    Kavitha NG RN  [facility] [place]  [phone]

## 2024-08-16 ENCOUNTER — TRANSFERRED RECORDS (OUTPATIENT)
Dept: HEALTH INFORMATION MANAGEMENT | Facility: CLINIC | Age: 49
End: 2024-08-16

## 2024-08-16 ENCOUNTER — ALLIED HEALTH/NURSE VISIT (OUTPATIENT)
Dept: ENDOCRINOLOGY | Facility: CLINIC | Age: 49
End: 2024-08-16
Payer: COMMERCIAL

## 2024-08-16 ENCOUNTER — DOCUMENTATION ONLY (OUTPATIENT)
Dept: ENDOCRINOLOGY | Facility: CLINIC | Age: 49
End: 2024-08-16

## 2024-08-16 DIAGNOSIS — E66.09 CLASS 1 OBESITY DUE TO EXCESS CALORIES WITH SERIOUS COMORBIDITY IN ADULT, UNSPECIFIED BMI: Primary | ICD-10-CM

## 2024-08-16 DIAGNOSIS — E66.811 CLASS 1 OBESITY DUE TO EXCESS CALORIES WITH SERIOUS COMORBIDITY IN ADULT, UNSPECIFIED BMI: Primary | ICD-10-CM

## 2024-08-16 PROCEDURE — 91200 LIVER ELASTOGRAPHY: CPT | Performed by: NURSE PRACTITIONER

## 2024-08-16 PROCEDURE — 99207 PR NO CHARGE LOS: CPT

## 2024-08-16 NOTE — PROGRESS NOTES
EXAMINATION:    Liver FibroScan    DATE OF EXAM:  8/16/24    INDICATION:    Liver fibrosis assessment      A series of at least 10 Vibration Controlled Transient Elastography (VCTETM) measurements was performed by  placing the probe M (M, XL) over the center of the liver parenchyma and mechanically inducing a 50 Hertz  shear wave.    Each resulting VCTETM measurement was analyzed to determine shear wave propagation speed and  calculate the equivalent liver stiffness.    All measurements were reviewed by the  and physician fortechnical accuracy. Data variability across the acquired measurements was quantified with IQR/Median Percentage.    FINDINGS:    The median liver stiffness was 3.9 kPa with IQR/Median percentage of 6 %.    The measure CAP ultrasound attenuation rate value was 285 dB/m.

## 2024-09-12 ENCOUNTER — TELEPHONE (OUTPATIENT)
Dept: ENDOCRINOLOGY | Facility: CLINIC | Age: 49
End: 2024-09-12
Payer: COMMERCIAL

## 2024-09-12 NOTE — TELEPHONE ENCOUNTER
Pt. Saw Dr. Plata and then had a fatty liver scan done. Pt. Would like to know next steps and specifically regarding whether or not she has fatty liver disease. Please call Pt. At [phone]. Okay to leave a detailed message.

## 2024-09-17 DIAGNOSIS — L70.0 ACNE VULGARIS: ICD-10-CM

## 2024-09-24 RX ORDER — SPIRONOLACTONE 50 MG/1
100 TABLET, FILM COATED ORAL DAILY
Qty: 180 TABLET | Refills: 1 | Status: SHIPPED | OUTPATIENT
Start: 2024-09-24

## 2024-09-24 NOTE — TELEPHONE ENCOUNTER
Prescription approved per [facility] Refill Protocol.    Kavitha NG RN  [facility] [place]  [phone]

## 2024-10-03 ENCOUNTER — TELEPHONE (OUTPATIENT)
Dept: ENDOCRINOLOGY | Facility: CLINIC | Age: 49
End: 2024-10-03
Payer: COMMERCIAL

## 2024-10-03 ENCOUNTER — VIRTUAL VISIT (OUTPATIENT)
Dept: PHARMACY | Facility: CLINIC | Age: 49
End: 2024-10-03
Attending: INTERNAL MEDICINE
Payer: COMMERCIAL

## 2024-10-03 VITALS — WEIGHT: 182 LBS | BODY MASS INDEX: 31.22 KG/M2

## 2024-10-03 DIAGNOSIS — G43.019 INTRACTABLE MIGRAINE WITHOUT AURA AND WITHOUT STATUS MIGRAINOSUS: ICD-10-CM

## 2024-10-03 DIAGNOSIS — J30.2 SEASONAL ALLERGIES: ICD-10-CM

## 2024-10-03 DIAGNOSIS — K76.0 HEPATIC STEATOSIS: ICD-10-CM

## 2024-10-03 DIAGNOSIS — F42.9 OBSESSIVE-COMPULSIVE DISORDER, UNSPECIFIED TYPE: ICD-10-CM

## 2024-10-03 DIAGNOSIS — R53.82 CHRONIC FATIGUE: Chronic | ICD-10-CM

## 2024-10-03 DIAGNOSIS — F32.89 OTHER DEPRESSION: ICD-10-CM

## 2024-10-03 DIAGNOSIS — L70.8 OTHER ACNE: ICD-10-CM

## 2024-10-03 DIAGNOSIS — Z78.9 TAKES DIETARY SUPPLEMENTS: ICD-10-CM

## 2024-10-03 DIAGNOSIS — E66.811 CLASS 1 OBESITY DUE TO EXCESS CALORIES WITH SERIOUS COMORBIDITY AND BODY MASS INDEX (BMI) OF 31.0 TO 31.9 IN ADULT: Primary | Chronic | ICD-10-CM

## 2024-10-03 DIAGNOSIS — E66.09 CLASS 1 OBESITY DUE TO EXCESS CALORIES WITH SERIOUS COMORBIDITY AND BODY MASS INDEX (BMI) OF 31.0 TO 31.9 IN ADULT: Primary | Chronic | ICD-10-CM

## 2024-10-03 RX ORDER — IBUPROFEN 200 MG
400 TABLET ORAL EVERY 6 HOURS PRN
COMMUNITY

## 2024-10-03 RX ORDER — CALCIUM CARBONATE/VITAMIN D3 600 MG-10
1 TABLET ORAL DAILY
COMMUNITY

## 2024-10-03 RX ORDER — VORTIOXETINE 20 MG/1
20 TABLET, FILM COATED ORAL DAILY
COMMUNITY
Start: 2024-09-01

## 2024-10-03 ASSESSMENT — PATIENT HEALTH QUESTIONNAIRE - PHQ9: SUM OF ALL RESPONSES TO PHQ QUESTIONS 1-9: 11

## 2024-10-03 NOTE — Clinical Note
[employer] employee- used cpa with Jaky today to start Wegovy.   Also reminded patient to get repeat Thyroid levels ordered by Dr. Plata and I added Vitamin D as well.  I will see her again in Dec. Dr. Plata in Jan.

## 2024-10-03 NOTE — TELEPHONE ENCOUNTER
PA Initiation    Medication: WEGOVY 0.25 MG/0.5ML SC SOAJ  Insurance Company: [employer] - Phone [phone] Fax [phone]  Pharmacy Filling the Rx: [facility] - [place], [place] - [address]  Filling Pharmacy Phone:    Filling Pharmacy Fax:    Start Date: 10/3/2024    Key: [id number]

## 2024-10-03 NOTE — PROGRESS NOTES
"Virtual Visit Details    Type of service:  Telephone Visit   Phone call duration: *** minutes   Originating Location (pt. Location): {patient location:567893::\"Home\"}  {PROVIDER LOCATION On-site should be selected for visits conducted from your clinic location or adjoining [facility] hospital, academic office, or other nearby [facility] building. Off-site should be selected for all other provider locations, including home:[id number]}  Distant Location (provider location):  {virtual location provider:[id number]}      "

## 2024-10-03 NOTE — PATIENT INSTRUCTIONS
"Recommendations from MTM Pharmacist visit:                                                    MTM (medication therapy management) is a service provided by a clinical pharmacist designed to help you get the most of out of your medicines.  You may be sent a phone or email survey evaluating today's visit.  Please provide feedback you have for the service he received today if you are able.      [facility] employees with [employer] insurance ([facility]) are restricted to using the [facility] for Saxenda, Wegovy, and Zepbound    [facility]  [place], [place] - [address]  Phone: [phone]    Next steps:  After processing the prescription and saving to your profile, the pharmacy will give you an automated call to inform you that they have your prescription on file. This typically occurs within 1-2 days after the prescription is sent but may take 3-5 business days during high volume times.  You will need to call the pharmacy back to set up the first delivery of every new prescription or new medication dose.   Once you are on a stable dose, you can inquire with the pharmacy about signing up for \"Text to Order through [employer]\", \"Auto Fill\", and/or using the \"My neo[employer] Rx\" glenna.     4. Start Wegovy 0.25 mg injection once weekly. Take this medication on the same day each week for 4 weeks. If tolerating, you may increase to 0.5 mg once weekly on week 5.      As a reminder, here is a great link for how to administer your Wegovy:  www.[employer]/taking-wegovy/how-to-use-the-wegovy-pen.html    If cost is prohibitive, you may try using Wegovy coupon (www.wegovy.com/coverage-and-savings)    To help with tolerability and effectiveness of Wegovy:  Eat small meals/snacks throughout the day (about every 2 hours)  Focus on getting protein in first with each meal and snack.   A good starting goal is 60 g protein daily (track this, especially if at weight loss " Congratulations on going home. We hope your experience was as pleasant as possible and are happy to have the opportunity to take care of you. Please remember we are here to help you, and are a phone call away with any questions or concerns you may have after you leave the hospital.  [phone]    Diet:  · Advance to your regular diet  · We encourage you to increase your fluid intake    Activity:  · It is okay to shower. No bathing until seen by the doctor.  · It is okay to climb stairs when discharged from the hospital.  · Walking is good exercise, which improves circulation. Do not over do it!  Go easy at first and slowly increase the distance, as you feel better.  · Avoid any heavy lifting >10 pounds or strenuous exercise for 1-2 weeks  · Avoid sexual intercourse for 1-2 weeks  · It is okay to drive if you feel up to it but do not drive while taking narcotic pain medicaiton    Nath Catheter:  · Most patients have their catheter (tube in the bladder which drains your urine) removed prior to being discharged. If you go home with a catheter it will remain in for about 3-4 days after the surgical procedure. It is normal to feel some pressure and discomfort  · You may experience some leaking around the catheter- this is also normal. It is also normal to have some blood in your urine or around the catheter when you move your bowels. Always make sure that no tubes are kinked so the urine can flow freely. It it becomes plugged please call our office.    Medications:  · Take Keflex as prescribed  · Take Tramadol as prescribed for severe pain, do not drive any motorized vehicle, or sign any legal documents while you are taking narcotic pain medications. This medication is known to cause constipation.  · OK to take acetaminophen for less severe however do not take acetaminophen if taking a medication already combined with acetaminophen. Do not exceed the maximum daily dosage of acetaminophen which is 4g/day from all  "plateau). Once you consistently are getting 60g daily, try getting 90 g protein daily.  Drink plenty of water - goal 64 oz throughout the day  You may try Metamucil, Benefiber, or Citrucel to help feel more full (less nausea) and have softer, more consistent bowel movements.  To optimize weight management - work on incorporating resistance training/weight lifting to build muscle and improve overall metabolism of adipose tissue.    5. Stop probiotic daily to prevent gas and bloating. YOu may use up to 4 weeks after bout of diarrhea or antibiotic use to replenish good gut ashok, chronic use is not necessary after ashok is restored.    6. Schedule visit with [facility] to repeat TSH and T4 (thyroid) labs ordered by Christopher Pittman. I also added vitamin D level to collect at that visit as well.    Follow-up: 12/11 for phone visit with Michelle Miner [facility]  1/24 for in person visit with Christopher Pittman         It was great speaking with you today.  I value your experience and would be very thankful for your time in providing feedback in our clinic survey. In the next few days, you may receive an email or text message from [facility] with a link to a survey related to your  clinical pharmacist.\"     To schedule another MTM appointment, please call the clinic directly ([facility] Phone Number: [phone] (schedules for [facility] and [facility] - providers, dietitians, health coaches) or you may call the MTM scheduling line at [phone] or toll-free at [phone].     My Clinical Pharmacist's contact information:                                                      Please feel free to contact me with any questions or concerns you have.      Michelle Miner, Pharm D., MPH    Medication Therapy Management Pharmacist   [facility]          Meal Replacement Shake Options:   *Protein Shake Criteria: no more than " sources.  · Take pyridium as prescribed to soothe the lining of your bladder, this will turn your urine orange  · Take Ditropan as prescribed for bladder spasms  · OK to apply water soluble lubrication jelly or bacitracin to tip of penis if having catheter irritation  · Take a stool softener to promote regular soft bowel movements and avoid constipation.  Avoid straining during a bowel movement.  · If you were on Flomax prior to surgery, recommend continuing such until your post of check  · OK to resume baby aspirin 3 days post op or when urine is clear    Miscellaneous:   · Expect some blood in your urine for a few weeks after this procedure    Follow up:  · Clinic scheduler will contact you to arrange gil removal on Monday 11/11/19, I have notified them of your preference for the [facility] and they will attempt to accommodate such   · Follow-up with Dr. Barriga in 4 weeks for post op check, we will call when with pathology results when available  · If you have continuous bleeding with the passage of large clots, or if you are unable to urinate please call the office [phone]          Emptying and Cleaning Your Urinary Catheter Bag  You have an indwelling urinary catheter. This drains urine from your bladder into a bag. The bag can be one that is used at the bedside. Or it can be a smaller bag that is strapped to the leg. Follow the numbered steps below to empty and clean a urinary bag.       Drain Clean tube Clean catheter   Step 1. Drain the bag  · Wash your hands well with soap and water to prevent infecting the urinary catheter and bag.  · If the short drainage tube is inserted into a pocket on the bag, take the drainage tube out of the pocket.  · Hold the drainage tube over a toilet or measuring container. Open the valve.  · Don’t touch the tip of the valve or let it touch the toilet or container.  · Wash your hands again.  Step 2. Clean the drainage tube  · When the bag is empty, clean the tip  210 Calories, at least 20 grams of protein, and less than 10 grams of sugar   Premier Protein (160 Calories, 30 g protein)  Slim Fast Advanced Nutrition (180 Calories, 20 g protein)  Muscle Milk, lactose-free, 17 oz bottle (210 Calories, 30 g protein)  Integrated Supplements, no artificial sugars (110 Calories, 20 g protein)  Boost/Ensure Max (160 calories, 30 gm protein)   Fairlife Protein Shakes (160-230 calories, 26-42 gm protein)  Aldi's [facility] Protein Powder (180 calories, 30 gm protein)   Orgain Protein Shakes (130-160 calories, 20-26 gm protein)     Meal Replacement Bar Options:  Quest Protein Bars (190 Calories, 20 g protein)  Built Bar (170 Calories, 15-20 g protein)  One Protein Bar (210 calories, 20 g protein)  Ramirez Signature Protein Bar ([employer]) (190 Calories, 21 g protein)  Pure Protein Bars (180 Calories, 21 g protein)    Low Calorie Frozen Meal:  Healthy Choice Power Bowls  Lean Cuisine  Smart Ones  Paulo Dominguez      ---------------------------------------------------------------  Tips to Increase the Protein in Your Diet  You may need more protein in your diet to help you heal from an illness, surgery or wound. Extra protein can also help you gain weight. Here are some ideas for adding high-protein foods to your meals.  Meat and fish  Add chopped cooked meat to vegetables, salads, casseroles, soups, sauces and biscuit dough.  Use in omelets, soufflés, quiches, sandwich fillings and chicken or turkey stuffing.  Wrap in pie crust or biscuit dough to make a turnover.  Add to stuffed baked potatoes.  Make a dip with diced meat or flaked fish mixed with sour cream and spices.  Chopped, hard-cooked eggs  Add to salads.  Use for snacks and sandwich filling.  High-protein milk  To make high-protein milk, mix 1 quart whole milk with 1 cup powdered milk.  Add to cream soups, mashed potatoes, scrambled eggs, cereals and dried eggnog mix.  Use as an ingredient in puddings, custards, hot chocolate,  milk shakes and pancakes.  Powdered milk  If you don't have any high-protein milk on hand, you can use powdered milk. Add 3 tablespoons to:  gravies, sauces, cream soups, mayonnaise  casseroles, meat patties, meatloaf, tuna salad, deviled ham  scalloped or mashed potatoes, creamed spinach  scrambled eggs, egg salad  cereals  yogurt, milk drinks, ice cream, frozen desserts, puddings, custards.  Add 4 to 6 tablespoons powdered milk to make:  cream sauces  breads, muffins, pancakes, waffles, cookies, cakes  cream pies, frostings, cake fillings  fruit cobblers, bread or rice pudding, gelatin desserts.  For high-protein eggnog, add 3 to 6 tablespoons powdered milk to prepared eggnog.  Hard or soft cheese  Melt on sandwiches, breads, tortillas, hamburgers, hot dogs, other meats, vegetables, eggs and pies.  Grate into soups, chili, sauces, casseroles, vegetables, potatoes, rice, noodles or meatloaf.  Eat with toast or crackers, or melt for adelso dip.  Cottage cheese or ricotta cheese  Mix with or scoop on top of fruits and vegetables.  Add to casseroles, lasagna, spaghetti, noodles and egg dishes (omelets, scrambled eggs, soufflés).  Use in gelatin, pudding-type desserts, cheesecake and pancake batter.  Use to stuff crepes, pasta shells or manicotti.  Fruit yogurt  Blend with fruits for a fruit smoothie.  Use as a dip for fruits and vegetables.  Scoop on top of pancakes or waffles.  Tofu  Blend silken tofu with fruits and juices for a smoothie.  Add chunks of firm tofu to soups and stews, or crumble into meatloaf.  Blend dried onion soup mix into soft or silken tofu for dip.  Use pureed silken tofu for part of the mayonnaise, sour cream, cream cheese or ricotta cheese called for in recipes.  Beans  Use cooked beans or peas in soups, casseroles, pasta, tacos and burritos.  Nuts and seeds  Note: For children under 3, discuss with the child's care team.  Use in casseroles, breads, muffins, pancakes, cookies and  soap. Close the valve.  ? Fill the bag with 2 parts white vinegar and 3 parts water. You can also use 1 tablespoon of chlorine bleach mixed with a half cup of water. Shake the solution a bit and allow it to remain in the bag for 30 minutes.  ? Drain the solution and rinse the bag with cold tap water.  ? Hang the bag to drain and air-dry.  When to call your healthcare provider  Call your healthcare provider right away if you have any of the following:  · Little or no urine flowing into the bag  · Urine leaking where the catheter enters the body  · Pain, burning, or redness of the area where the catheter enters the body  · Bloody urine (a trace of blood is normal)  · Cloudy or foul-smelling urine, or sand-like grains in your urine  · Pain in your lower back or lower abdomen  · Your catheter falls out  · Fever of 100.4°F (38°C) or higher, or shaking chills   Date Last Reviewed: 1/1/2017  © 7164-8102 [employer]. [address] [place] [address]. All rights reserved. This information is not intended as a substitute for professional medical care. Always follow your healthcare professional's instructions.      Discharge Instructions: Caring for Your Leg Bag  You are going home with a urinary catheter and collection device (drainage bag) in place. One type of collection device is called a leg bag. This is a smaller drainage bag that you can wear on your leg to collect urine during the day. The bag can fit under your clothing. You can move around with greater ease when using a leg bag instead of a larger collection bag.  You were shown how to care for your catheter in the hospital. This sheet will help you remember those steps when you are at home.  Home care  · Wash your hands thoroughly before and after you care for your catheter or collection device.  · Gather your supplies:  ? Alcohol wipes  ? Soap and water  ? Towel and washcloth  ? Leg strap and leg bag  · Use soap and water to wash the area  waffles.  Sprinkle on fruits, cereals, ice cream, yogurt, vegetables and salads.  Mix with raisins, dried fruits and chocolate chips for a snack.  Nut butters  Note: For children under 3, discuss with the child's care team.  Spread on sandwiches, toast, muffins, crackers, waffles, pancakes and fruit slices.  Use as a dip for raw vegetables.  Blend with milk drinks, or swirl through ice cream, yogurt or hot cereal.  Nutrition supplements (nutrition bars, drinks and powders)  Add powders to milk drinks and desserts.  Mix with ice cream, milk and fruit for a high-protein milk shake.    For informational purposes only. Not to replace the advice of your health care provider. Clinically reviewed by Lisa Martinez RD, SOLOMON, and the Clinical Nutrition Service Line. Copyright   2005 [facility]. All rights reserved. [employer] 534625 - REV 04/24.      -----------------------------------------------------------------------------------------------------------------  Learning About High-Protein Foods  What foods are high in protein?     The foods you eat contain nutrients, such as vitamins and minerals. Protein is a nutrient. Your body needs the right amount to stay healthy and work as it should. You can use the list below to help you make choices about which foods to eat.  Here are some examples of foods that are high in protein.  Dairy and dairy alternatives  Cheese  Milk  Soy milk  Yogurt (especially Greek)  Meat  Beef  Chicken  Ham  Lamb  Lunch meat  Pork  Sausage  Turkey  Other protein foods  Beans (black, garbanzo, kidney, lima)  Eggs  Hummus  Lentils  Nuts  Peanut butter and other nut butters  Peas  Soybeans  Tofu  Veggie or soy robson (Check the nutrition label for the amount of protein in each serving.)  Seafood  Anchovies  Cod  Crab  Halibut  Sims  Sardines  Shrimp  Tilapia  [place]  Tuna  Protein supplements  Bars (Check the nutrition label for the amount of protein in each  serving.)  Drinks  Powders  Work with your doctor to find out how much of this nutrient you need. Depending on your health, you may need more or less of it in your diet.  Where can you learn more?  Go to https://www.[employer].net/patiented  Enter P335 in the search box to learn more about \"Learning About High-Protein Foods.\"  Current as of: September 20, 2023               Content Version: 14.0    2802-6808 [employer].   Care instructions adapted under license by your healthcare professional. If you have questions about a medical condition or this instruction, always ask your healthcare professional. [employer], [employer] disclaims any warranty or liability for your use of this information.         " uncomfortable.  · Keep the leg bag clean. Rinse daily with equal parts water and vinegar to reduce odor and keep the bag free of germs.  · Remember to keep the drainage bag below the level of your bladder for proper drainage.  Follow-up  Make a follow-up appointment as directed by your healthcare provider.  When to call your healthcare provider  Call your healthcare provider right away if you have any of the following:  · Redness, swelling, or warmth around the catheter entry site  · Pus draining from your catheter entry site or into the catheter tubing and bag  · Blood, clots, or floating debris in the urine  · Nausea and vomiting  · Shaking chills  · Fever above 100.4°F (38°C)  · Pain that is not relieved by medicine   · Catheter that falls out or is dislodged   Date Last Reviewed: 1/1/2017  © 6761-5683 [employer]. [address] [place] [address], [place] [address]. All rights reserved. This information is not intended as a substitute for professional medical care. Always follow your healthcare professional's instructions.

## 2024-10-03 NOTE — PROGRESS NOTES
Medication Therapy Management (MTM) Encounter    ASSESSMENT:                            Medication Adherence/Access: See below for considerations    Weight management :   Patient would benefit from additional pharmacotherapy for weight management. Given class III obesity, recommend GLP1/GIP therapy as data to support most significant weight loss and patient has no contraindications. Patient also likely to benefit from reduction in food noise and increased satiety. Discussed dietary and behavioral modifications.     Completed teaching of administration of Wegovy . Discussed mechanism of action, side effects, warnings, and efficacy. Discussed appropriate storage and stability. Answered patient questions.     Recommend repeat TSH and T4 given Christopher Pittman recommendation after TSH elevated in July.    For patients that are under [employer] insurance coverage, it is mandated by insurance that each qualifying patient meet with hospital based Weight Management Medication Therapy Management pharmacist to continue therapy coverage. The following patient meets the below coverage criteria and can therefore continue GLP-1/GIP agonist therapy for Weight Management:    Adult    BMI >30 + MASLD*   at time of initiating GLP-1/GIP agonist therapy Approved for 6 months, then Prior Authorization will be re-submitted to verify efficacy with insurance to continue coverage.  If meets updated Initial Criteria (at least 5% weight loss of baseline body weight), GLP1 or GLP1/GIP for Weight Management will be approved for 12 months.     ICD-10 code K76.0: fatty liver, non-alcoholic fatty liver, MASLD, hepatic steatosis    Fibroscan - 8/16/24     Depression/OCD: stable - defer to Dr. Kavita Samayoa    Supplements: recommend stopping probiotic ahead of starting Wegovy given risk of nausea and chronic probiotic may increase risk of gas/bloating. Due for vitamin D level (exceeding NOF recommendation) - ordered  "today.    Acne/Dandruff:stable     Allergies:stable     Migraine:stable - recommend establish with PCP to discuss Maxalt refills    PLAN:                            [employer] employees with [employer] insurance ([facility]) are restricted to using the [facility] for Saxenda, Wegovy, and Zepbound    [facility]  [place], [place] - [address]  Phone: [phone]    Next steps:  After processing the prescription and saving to your profile, the pharmacy will give you an automated call to inform you that they have your prescription on file. This typically occurs within 1-2 days after the prescription is sent but may take 3-5 business days during high volume times.  You will need to call the pharmacy back to set up the first delivery of every new prescription or new medication dose.   Once you are on a stable dose, you can inquire with the pharmacy about signing up for \"Text to Order through [employer]\", \"Auto Fill\", and/or using the \"My [employer] Rx\" glenna.     4. Start Wegovy 0.25 mg injection once weekly. Take this medication on the same day each week for 4 weeks. If tolerating, you may increase to 0.5 mg once weekly on week 5.      As a reminder, here is a great link for how to administer your Wegovy:  www.[employer]/taking-wegovy/how-to-use-the-wegovy-pen.html    If cost is prohibitive, you may try using Wegovy coupon (www.wegovy.com/coverage-and-savings)    To help with tolerability and effectiveness of Wegovy:  Eat small meals/snacks throughout the day (about every 2 hours)  Focus on getting protein in first with each meal and snack.   A good starting goal is 60 g protein daily (track this, especially if at weight loss plateau). Once you consistently are getting 60g daily, try getting 90 g protein daily.  Drink plenty of water - goal 64 oz throughout the day  You may try Metamucil, Benefiber, or Citrucel to help feel more full (less nausea) and " have softer, more consistent bowel movements.  To optimize weight management - work on incorporating resistance training/weight lifting to build muscle and improve overall metabolism of adipose tissue.    5. Stop probiotic daily to prevent gas and bloating. YOu may use up to 4 weeks after bout of diarrhea or antibiotic use to replenish good gut ashok, chronic use is not necessary after ashok is restored.    6. Schedule visit with [facility] lab to repeat TSH and T4 (thyroid) labs ordered by Christopher Pittman. I also added vitamin D level to collect at that visit as well.    Follow-up: 12/11 for phone visit with Michelle Miner, [facility]  1/24 for in person visit with Christopher Pittman     SUBJECTIVE/OBJECTIVE:                          Mireille Walls is a 49 year old female seen for an initial visit. She was referred to me from [employer] insurance requirements .      Reason for visit: [employer] insurance requirements - GLP1/GIP Agonist Management Medication Therapy Management .    Allergies/ADRs: Reviewed in chart  Past Medical History: Reviewed in chart  Tobacco: She reports that she has never smoked. She has never used smokeless tobacco.  Alcohol: not currently using  Caffeine: mini-diet dew at work, none at home    Medication Adherence/Access: Medication barriers: obtaining medication from insurance.     Weight Management   Bupropion  mg once daily (for mental health)     New Medical Weight Managment Visit with Christopher Pittman 6/28/24  Registered dietician visit with Juliana Crane Registered Dietician 7/16/24    Patient reports working overnight - gets hungry on the way home and will go through drive thru. Especially after menopause and depression. 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 Depression also contributes to overeating/relationship with food and fatigue. Hopeful for some improved relationship with food and more energy to help feel better with clothing and overall    Nutrition/Eating Habits: doesn't  "care for cooking -so tries to focus on fast proteins. Good snack recently - milk for protein.    Water: water bottle       Exercise/Activity: walks a lot at work - many days >10,000K steps.     Medications Tried/Failed:  GLP1/GIP agonist : Patient denies personal or family history of MEN Type2, MTC, Pancreatitis.   Abstinent - no current pregnancy risk; patient acknowledges understanding of this risk.    Initial Consult Weight: 182 lb       Wt Readings from Last 4 Encounters:   10/03/24 182 lb (82.6 kg)   07/16/24 182 lb (82.6 kg)   06/28/24 182 lb (82.6 kg)   07/25/23 182 lb 9.6 oz (82.8 kg)     Estimated body mass index is 31.22 kg/m  as calculated from the following:    Height as of 7/16/24: 5' 4.02\" (1.626 m).    Weight as of this encounter: 182 lb (82.6 kg).      Lab Results   Component Value Date    A1C 4.9 11/08/2018    GLC 90 07/18/2024     07/18/2024    POTASSIUM 4.3 07/18/2024    TRINI 9.4 07/18/2024    CHLORIDE 105 07/18/2024    CO2 23 07/18/2024    BUN 16.0 07/18/2024    CR 0.74 07/18/2024    GFRESTIMATED >90 07/18/2024    CHOL 253 (H) 07/18/2024     (H) 07/18/2024    HDL 51 07/18/2024    TRIG 309 (H) 07/18/2024    VITDT 54 07/25/2023    B12 912 07/25/2023    TSH 6.30 (H) 07/18/2024     Liver Function Studies -   Recent Labs   Lab Test 07/18/24  1045   PROTTOTAL 7.0   ALBUMIN 4.3   BILITOTAL 0.2   ALKPHOS 66   AST 21   ALT 13      Depression/OCD:   Bupropion  mg - 3 tabs once daily   Trintellix 20 mg once daily     Psychiatry - Dr. Kavita Samayoa- follows with for years    Has been on current dose for several years - works well. Denies side effects.    Supplements:   Multivitamin once daily  Calcium/Vitamin D - 600 mg/20 mcg once daily   Vitamin D 50 mcg every other day   Cod Liver Oil (unknown strength) once daily  Probiotic once daily    No reported issues at this time.     Acne/Dandruff:  Spironolactone 50 mg - 2 tabs once daily  Ketoconazole 2% - weekly     Works well. Denies side " effects.    Allergies:  Loratadine 10 mg once daily    Works well year round. Denies side effects.    Migraine:  Rizatriptan 5 mg as needed - not taking   Ibuprofen 200 mg - 2-3 tabs as needed for headache      Doesn't have refills of rizatriptan at this time. Originally prescribed by neurology and no longer follows with them. Doesn't have PCP at this time- will work to establish care for refill of this as it works well. Hormonal changes has led to ebb/flow of migraine over years - currently mostly managed, would like rizatriptan for more severe migraines.    Today's Vitals: Wt 182 lb (82.6 kg)   BMI 31.22 kg/m    ----------------      I spent 52 minutes with this patient today. All changes were made via collaborative practice agreement with Christopher Plata and Jaky Keenan PA-C.  A copy of the visit note was provided to the patient's provider(s).    A summary of these recommendations was sent via [employer].    Michelle Miner, Pharm D., MPH    Medication Therapy Management Pharmacist   [facility]      Telemedicine Visit Details  The patient's medications can be safely assessed via a telemedicine encounter.  Type of service:  Telephone visit  Originating Location (pt. Location): Home    Distant Location (provider location):  Off-site  Start Time:  1:01 PM  End Time: 1:53 PM     Medication Therapy Recommendations  Class 1 obesity due to excess calories with serious comorbidity and body mass index (BMI) of 31.0 to 31.9 in adult    Current Medication: Semaglutide-Weight Management (WEGOVY) 0.25 MG/0.5ML pen   Rationale: Medication product not available - Adherence - Adherence   Recommendation: Provide Adherence Intervention   Status: Accepted per CPA         Takes dietary supplements    Current Medication: Probiotic Product (PROBIOTIC PO) (Discontinued)   Rationale: Treating avoidable adverse medication reaction - Unnecessary medication therapy - Indication    Recommendation: Discontinue Medication   Status: Patient Agreed - Adherence/Education

## 2024-10-03 NOTE — NURSING NOTE
Current patient location: [address] [place] [address]    Is the patient currently in the state of [place]? YES    Visit mode:TELEPHONE    If the visit is dropped, the patient can be reconnected by: TELEPHONE VISIT: Phone number: [phone]    Will anyone else be joining the visit? NO  (If patient encounters technical issues they should call [phone]    How would you like to obtain your AVS? MyChart    Are changes needed to the allergy or medication list? No, Pt stated no changes to allergies, and Pt stated no med changes    Are refills needed on medications prescribed by this physician? NO    Reason for visit: Medication therapy Managment  Gaby Douglas VVF    Call pt at this number, [phone]

## 2024-10-03 NOTE — TELEPHONE ENCOUNTER
Prior Authorization Retail Medication Request    Medication/Dose: [facility] insurance requirements Wegovy 0.25 mg - 2.4 mg (all doses) as indicated by supply, safety, and efficacy.   Diagnosis and ICD code (if different than what is on RX):  BMI>30 with NAFLD    New/renewal/insurance change PA/secondary ins. PA:    Rationale:  Patient met with [facility] Medication Therapy Management Pharmacist 10/03/2024 per [facility] insurance requirements. Patient denies personal or family history of MEN Type2, MTC, Pancreatitis.       For patients that are under [employer] insurance coverage, it is mandated by insurance that each qualifying patient meet with hospital based Weight Management Medication Therapy Management pharmacist to continue therapy coverage. The following patient meets the below coverage criteria and can therefore continue GLP-1/GIP agonist therapy for Weight Management:     Adult     BMI >30 + MASLD*   at time of initiating GLP-1/GIP agonist therapy Approved for 6 months, then Prior Authorization will be re-submitted to verify efficacy with insurance to continue coverage.  If meets updated Initial Criteria (at least 5% weight loss of baseline body weight), GLP1 or GLP1/GIP for Weight Management will be approved for 12 months.      ICD-10 code K76.0: fatty liver, non-alcoholic fatty liver, MASLD, hepatic steatosis     Fibroscan - 8/16/24    Insurance       [facility] CORE  Subscriber:  Mireille Walls  Subscriber ID:[id number]  Relationship:Self  Member:Mireille Walls  Member ID:[id number]  LOB:None  Plan year:  1/1/2024 - [place]  Effective dates:  1/1/2023 - [place]  Group number:  [id number]    Pharmacy Information (if different than what is on RX)  Name:  [facility]      Michelle Miner, Pharm D., MPH    Medication Therapy Management Pharmacist   [facility]

## 2024-10-07 ENCOUNTER — LAB (OUTPATIENT)
Dept: LAB | Facility: CLINIC | Age: 49
End: 2024-10-07
Payer: COMMERCIAL

## 2024-10-07 DIAGNOSIS — F32.89 OTHER DEPRESSION: ICD-10-CM

## 2024-10-07 DIAGNOSIS — R94.6 ABNORMAL FINDING ON THYROID FUNCTION TEST: ICD-10-CM

## 2024-10-07 LAB
T4 FREE SERPL-MCNC: 0.95 NG/DL (ref 0.9–1.7)
TSH SERPL DL<=0.005 MIU/L-ACNC: 2.66 UIU/ML (ref 0.3–4.2)
VIT D+METAB SERPL-MCNC: 45 NG/ML (ref 20–50)

## 2024-10-07 PROCEDURE — 84443 ASSAY THYROID STIM HORMONE: CPT

## 2024-10-07 PROCEDURE — 82306 VITAMIN D 25 HYDROXY: CPT

## 2024-10-07 PROCEDURE — 36415 COLL VENOUS BLD VENIPUNCTURE: CPT

## 2024-10-07 PROCEDURE — 84439 ASSAY OF FREE THYROXINE: CPT

## 2024-10-08 NOTE — TELEPHONE ENCOUNTER
Prior Authorization Approval    Medication: WEGOVY 0.25 MG/0.5ML SC SOAJ  Authorization Effective Date: 10/4/2024  Authorization Expiration Date: 12/31/2024  Approved Dose/Quantity: 2ml per 28 days  Reference #: Key: [id number]   Insurance Company: [employer] - Phone [phone] Fax [phone]  Expected CoPay: $ 75  CoPay Card Available: Yes    Financial Assistance Needed: yes  Which Pharmacy is filling the prescription: [facility] - [place], [place] - [address]  Pharmacy Notified: yes  Patient Notified: yes

## 2024-10-17 ENCOUNTER — OFFICE VISIT (OUTPATIENT)
Dept: URGENT CARE | Facility: URGENT CARE | Age: 49
End: 2024-10-17
Payer: COMMERCIAL

## 2024-10-17 VITALS
SYSTOLIC BLOOD PRESSURE: 113 MMHG | HEART RATE: 74 BPM | DIASTOLIC BLOOD PRESSURE: 78 MMHG | WEIGHT: 182.5 LBS | TEMPERATURE: 98.4 F | RESPIRATION RATE: 12 BRPM | OXYGEN SATURATION: 97 % | BODY MASS INDEX: 31.31 KG/M2

## 2024-10-17 DIAGNOSIS — J03.90 TONSILLITIS: Primary | ICD-10-CM

## 2024-10-17 LAB
DEPRECATED S PYO AG THROAT QL EIA: NEGATIVE
GROUP A STREP BY PCR: NOT DETECTED

## 2024-10-17 PROCEDURE — 99213 OFFICE O/P EST LOW 20 MIN: CPT | Performed by: EMERGENCY MEDICINE

## 2024-10-17 PROCEDURE — 87651 STREP A DNA AMP PROBE: CPT | Performed by: EMERGENCY MEDICINE

## 2024-10-17 NOTE — PROGRESS NOTES
Assessment & Plan     Diagnosis:    ICD-10-CM    1. Tonsillitis  J03.90 Streptococcus A Rapid Screen w/Reflex to PCR - Clinic Collect     Group A Streptococcus PCR Throat Swab          Medical Decision Making:  Mireille Walls is a 49 year old female presented with sore throat and clinical evidence of tonsillitis.  The rapid strep test is negative, and formal culture has been set up in the lab. There is no clinical evidence of  peritonsillar abscess, retropharyngeal abscess, epiglottis, or Angel's angina. The etiology is most likely viral.  I have recommended treatment with analgesics, and we will await formal culture results.  If the culture is positive, a provider will call the patient to initiate anti-microbial therapy. Go to the ER if increasing pain, change in voice, neck pain, vomiting, fever, or shortness of breath. Follow-up with primary physician if not improving in 3-5 days. All questions answered. Patient verbalized understanding and agreement with the plan.    Larry Gomez PA-C  [facility]    Subjective     Mireille Walls is a 49 year old female who presents to clinic today for the following health issues:  Chief Complaint   Patient presents with    Urgent Care    Throat Problem     Throat swelling and grand swelling, suppose to go to work tomorrow, don't know if can go back to work and need a note, is working at the [employer]     Patient Request for Note/Letter       HPI  Patient states that yesterday she had experiencing sore throat, anterior neck swelling, worse on the right, some pain with swallowing but is able to get food and water down okay.  No difficulties breathing, fevers, posterior neck pain, abdominal pain, nausea, vomiting, diarrhea, yellowing of the skin or eyes, cough or other symptoms.    Review of Systems    See HPI    Objective      Vitals: /78 (BP Location: Left arm, Patient Position: Sitting, Cuff Size: Adult Regular)   Pulse 74   Temp 98.4  F (36.9  C)  (Tympanic)   Resp 12   Wt 82.8 kg (182 lb 8 oz)   SpO2 97%   BMI 31.31 kg/m        Patient Vitals for the past 24 hrs:   BP Temp Temp src Pulse Resp SpO2 Weight   10/17/24 1533 113/78 98.4  F (36.9  C) Tympanic 74 12 97 % 82.8 kg (182 lb 8 oz)       Vital signs reviewed by: Larry Gomez PA-C    Physical Exam   Constitutional: Alert and active. No acute distress.  Non-toxic appearing.  HENT:   Right Ear: External ear normal. TM: gray/pale appearing  Left Ear: External ear normal. TM: gray/pale appearing.  Nose: Nose normal.    Eyes: Conjunctivae, EOM and lids are normal.   Mouth: Mucous membranes are moist. Posterior oropharynx is erythematous. Exudates not present. Tonsils are enlarged; R > L. Uvula is midline. No submandibular swelling or erythema.  Neck: Normal ROM. No meningismus. Anterior cervical lymphadenopathy noted; R > L. No posterior chain cervical lymphadenopathy noted.  Cardiovascular: Regular rate and rhythm  Pulmonary/Chest: Effort normal. No respiratory distress. Lungs are clear to auscultation throughout.  GI: Abdomen is soft and non-tender throughout. No hepatosplenomegaly.  Skin: No rash noted on visualized skin.       Labs/Imaging:  Results for orders placed or performed in visit on 10/17/24   Streptococcus A Rapid Screen w/Reflex to PCR - Clinic Collect     Status: Normal    Specimen: Throat; Swab   Result Value Ref Range    Group A Strep antigen Negative Negative         Larry Gomez PA-C, October 17, 2024

## 2024-10-17 NOTE — LETTER
October 17, 2024      Mireille Walls  [address]  [place] [address]        To Whom It May Concern:    Mireille Walls  was seen on 10/17/2024.  Please excuse her 10/17 - 10/21/2024  due to illness.        Sincerely,        Larry Gomez PA-C

## 2024-10-24 ENCOUNTER — TELEPHONE (OUTPATIENT)
Dept: ENDOCRINOLOGY | Facility: CLINIC | Age: 49
End: 2024-10-24
Payer: COMMERCIAL

## 2024-10-24 NOTE — TELEPHONE ENCOUNTER
Medication Question or Refill        What medication are you calling about (include dose and sig)?: Wegovy    Preferred Pharmacy:   [facility] - [place], [place] - [address]  [address]  [address]  [facility] [address]  Phone: [phone] Fax: [phone] Alternate Fax: [phone], [phone], [phone]    [facility] - [place], [place] - [address]  [address]  [facility] [address]  Phone: [phone] Fax: [phone]    [id number] - [place], [place] - [address] AT [facility] [address]  [address] [place] [address]  Phone: [phone] Fax: [phone]      Controlled Substance Agreement on file:   CSA -- Patient Level:    CSA: None found at the patient level.       Who prescribed the medication?: Dr Plata    Do you need a refill? No        Patient offered an appointment? No    Do you have any questions or concerns?  Yes: patient is inquiring if she will be able to get the compounded version of Wegovy from the compounding pharmacy, after 1/1/2025. She is an [employer] employee w/[facility] insurance.      Could we send this information to you in [employer] or would you prefer to receive a phone call?:   Patient would like to be contacted via [employer]

## 2024-10-26 ASSESSMENT — PATIENT HEALTH QUESTIONNAIRE - PHQ9: SUM OF ALL RESPONSES TO PHQ QUESTIONS 1-9: 15

## 2024-10-26 ASSESSMENT — ANXIETY QUESTIONNAIRES: GAD7 TOTAL SCORE: 5

## 2024-11-02 ENCOUNTER — OFFICE VISIT (OUTPATIENT)
Dept: URGENT CARE | Facility: URGENT CARE | Age: 49
End: 2024-11-02
Payer: COMMERCIAL

## 2024-11-02 VITALS
DIASTOLIC BLOOD PRESSURE: 85 MMHG | TEMPERATURE: 98.2 F | HEART RATE: 86 BPM | SYSTOLIC BLOOD PRESSURE: 121 MMHG | OXYGEN SATURATION: 96 % | RESPIRATION RATE: 18 BRPM | BODY MASS INDEX: 31.02 KG/M2 | WEIGHT: 180.8 LBS

## 2024-11-02 DIAGNOSIS — J02.9 SORE THROAT: ICD-10-CM

## 2024-11-02 DIAGNOSIS — J35.1 ENLARGEMENT OF RIGHT PALATINE TONSIL: ICD-10-CM

## 2024-11-02 DIAGNOSIS — J03.90 ACUTE TONSILLITIS, UNSPECIFIED ETIOLOGY: Primary | ICD-10-CM

## 2024-11-02 LAB
DEPRECATED S PYO AG THROAT QL EIA: NEGATIVE
GROUP A STREP BY PCR: NOT DETECTED

## 2024-11-02 PROCEDURE — 99213 OFFICE O/P EST LOW 20 MIN: CPT | Performed by: PHYSICIAN ASSISTANT

## 2024-11-02 PROCEDURE — 87651 STREP A DNA AMP PROBE: CPT | Performed by: PHYSICIAN ASSISTANT

## 2024-11-02 RX ORDER — CEPHALEXIN 500 MG/1
500 CAPSULE ORAL 3 TIMES DAILY
Qty: 21 CAPSULE | Refills: 0 | Status: SHIPPED | OUTPATIENT
Start: 2024-11-02 | End: 2024-11-09

## 2024-11-02 NOTE — PROGRESS NOTES
Chief Complaint   Patient presents with    Pharyngitis     Sore throat in the past couple of days, swollen glands            Results for orders placed or performed in visit on 11/02/24   Streptococcus A Rapid Screen w/Reflex to PCR - Clinic Collect     Status: Normal    Specimen: Throat; Swab   Result Value Ref Range    Group A Strep antigen Negative Negative           ASSESSMENT:     ICD-10-CM    1. Acute tonsillitis, unspecified etiology  J03.90 cephALEXin (KEFLEX) 500 MG capsule      2. Sore throat  J02.9 Streptococcus A Rapid Screen w/Reflex to PCR - Clinic Collect     cephALEXin (KEFLEX) 500 MG capsule     Group A Streptococcus PCR Throat Swab            PLAN: Sore throat with mainly right tonsillar enlargement, second episode and second visit.  No evidence of peritonsillar abscess at this point.   Right tonsil could be enlarged at baseline compared to the left, do not know as I have not seen her before.  Will treat for tonsillitis with oral Keflex.  I have discussed clinical findings with patient.  Side effects of medications discussed.  Symptomatic care is discussed.  I have discussed the possibility of  worsening symptoms and indication to RTC or go to the ER if they occur.  All questions are answered, patient indicates understanding of these issues and is in agreement with plan.   Patient care instructions are discussed/given at the end of visit.       Melanie Latif PA-C      SUBJECTIVE:  49-year-old female presents for sore throat and right tonsillar enlargement x 2 days.  Seen here in urgent care 2 weeks ago and was told to observe  tonsillitis.  Strep test was negative at the time    Symptoms resolved but then returned past few days.  No fever or chills.  No cough.    Allergies   Allergen Reactions    Phenergan Vc [Promethazine-Phenylephrine] Visual Disturbance    Morphine And Codeine      IV       Past Medical History:   Diagnosis Date    Abnormal Pap smear 2006?    dysplasia  X 1; paps OK since  then...    Arm DVT (deep venous thromboembolism), acute (H) 2008    hx with phlebitis in IVs after a surgery    Breast disorder 2016    2D mammo, then 3D & u/s to r/o = scar tissue from reduction    Cholesterol serum elevated     runs in family    Complication of anesthesia 2000?, 2016    very slow to awake from both gen. anesth. & conscious sedati    Depression     Depressive disorder 1996    under care of a psychiatrist    Encounter for insertion of Mirena IUD 2011    D/C'd w increased acne    Hypertension 2017&2018    jamin. during exercise, caused by stimulant for depression?    IBS (irritable bowel syndrome) 2002    under control, better with probiotic    Menarche age 15    cycles q 1-2 months Xs 3-4 d w bad cramps    Migraines 2008    a lot better now, may be stress related    OCD (obsessive compulsive disorder)     Seasonal allergies        Current Outpatient Medications   Medication Sig Dispense Refill    buPROPion (WELLBUTRIN XL) 150 MG 24 hr tablet Take 450 mg by mouth every morning       calcium carbonate-vitamin D (CALTRATE) 600-10 MG-MCG per tablet Take 1 tablet by mouth daily.      Cholecalciferol (D3 VITAMIN PO) Take 2,000 Units by mouth every other day.      COD LIVER OIL PO Take 1 each by mouth daily.      Efinaconazole (JUBLIA) 10 % SOLN Externally apply topically at bedtime 4 mL 11    ibuprofen (ADVIL/MOTRIN) 200 MG tablet Take 400 mg by mouth every 6 hours as needed for pain.      ketoconazole (NIZORAL) 2 % external shampoo APPLY TO AFFECTED AREA(S) THREE TIMES WEEKLY 120 mL 11    loratadine (CLARITIN) 10 MG tablet Take 10 mg by mouth daily      Multiple Vitamins-Minerals (MULTIPLE VITAMINS/WOMENS PO) Take 1 tablet by mouth daily.      rizatriptan (MAXALT-MLT) 5 MG ODT Take 1-2 tablets (5-10 mg) by mouth at onset of headache for migraine May repeat dose in 2 hours.  Do not exceed 30 mg in 24 hours 10 tablet 1    Semaglutide-Weight Management (WEGOVY) 0.25 MG/0.5ML pen Inject 0.25 mg subcutaneously  once a week. May repeat dose additional 4 weeks if having side effects. 2 mL 1    Semaglutide-Weight Management (WEGOVY) 0.5 MG/0.5ML pen Inject 0.5 mg subcutaneously once a week. Start after 4 weeks of 0.25 mg Wegovy, if tolerating (start week 5) 2 mL 1    spironolactone (ALDACTONE) 50 MG tablet Take 2 tablets (100 mg) by mouth daily. 180 tablet 1    TRINTELLIX 20 MG tablet Take 20 mg by mouth daily.       No current facility-administered medications for this visit.       Social History     Tobacco Use    Smoking status: Never    Smokeless tobacco: Never   Substance Use Topics    Alcohol use: Not Currently     Comment: Social drinker, rare occassions       ROS:  CONSTITUTIONAL: Negative for fatigue or fever.  EYES: Negative for eye problems.  ENT: As above.  RESP: As above.  CV: Negative for chest pains.  GI: Negative for vomiting.  MUSCULOSKELETAL:  Negative for significant muscle or joint pains.  NEUROLOGIC: Negative for headaches.  SKIN: Negative for rash.  PSYCH: Normal mentation for age.    OBJECTIVE:  /85 (BP Location: Left arm, Patient Position: Sitting, Cuff Size: Adult Regular)   Pulse 86   Temp 98.2  F (36.8  C) (Tympanic)   Resp 18   Wt 82 kg (180 lb 12.8 oz)   SpO2 96%   BMI 31.02 kg/m    GENERAL APPEARANCE: Healthy, alert and no distress.  EYES:Conjunctiva/sclera clear.  EARS: No cerumen.   Ear canals w/o erythema.  TM's intact w/o erythema.    NOSE/MOUTH: Nose without ulcers, erythema or lesions.  SINUSES: No maxillary sinus tenderness.  THROAT: Moderate bilateral erythema with 2+ tonsillar enlargement right, 1+ left.  Uvula midline.  No exudates.   No exudates.  NECK: Supple, nontender, no lymphadenopathy.  RESP: Lungs clear to auscultation - no rales, rhonchi or wheezes  CV: Regular rate and rhythm, normal S1 S2, no murmur noted.  NEURO: Awake, alert    SKIN: No rashes      Melanie Latif PA-C

## 2024-11-25 NOTE — PATIENT INSTRUCTIONS
If you have labs or imaging done, the results will automatically release in [employer] without an interpretation.  Your health care professional will review those results and send an interpretation with recommendations as soon as possible, but this may be 1-3 business days.    If you have any questions regarding your visit, please contact your care team.     [employer]: [phone]  [facility] CLINIC HOURS TELEPHONE NUMBER   DO. Yael Cristina -Surgery Scheduler  Tamara -     IVETH Villalobos RN Kylie, RN [place]    Monday 8:30 am-5:00 pm  Wednesday 8:30 am-5:00 pm  Friday 8:30 am-5:00 pm    Typical Surgery day: Tuesday [facility]  [address] [place] [address]  Phone:  [phone]  Fax: [phone]   Appointment [phone]    Imaging Scheduling-All Locations [phone]    [facility] Labor and Delivery  [address] [place] [address] [place] [address] [phone]   **Surgeries** Our Surgery Schedulers will contact you to schedule. If you do not receive a call within 3 business days, please call [phone].  Urgent Care locations:  [facility] Monday-Friday   10 am - 8 pm  Saturday and    9 am - 5 pm [phone]   If you need a medication refill, please contact your pharmacy. Please allow 3 business days for your refill to be completed.  As always, Thank you for trusting us with your healthcare needs!  see additional instructions from your care team below

## 2024-12-04 ENCOUNTER — OFFICE VISIT (OUTPATIENT)
Dept: OBGYN | Facility: CLINIC | Age: 49
End: 2024-12-04
Payer: COMMERCIAL

## 2024-12-04 VITALS
DIASTOLIC BLOOD PRESSURE: 80 MMHG | BODY MASS INDEX: 30.37 KG/M2 | HEART RATE: 72 BPM | SYSTOLIC BLOOD PRESSURE: 123 MMHG | WEIGHT: 177 LBS | OXYGEN SATURATION: 97 %

## 2024-12-04 DIAGNOSIS — E28.39 ESTROGEN DEFICIENCY: ICD-10-CM

## 2024-12-04 DIAGNOSIS — Z00.00 ANNUAL PHYSICAL EXAM: Primary | ICD-10-CM

## 2024-12-04 DIAGNOSIS — Z12.31 ENCOUNTER FOR SCREENING MAMMOGRAM FOR BREAST CANCER: ICD-10-CM

## 2024-12-04 DIAGNOSIS — Z12.4 CERVICAL CANCER SCREENING: ICD-10-CM

## 2024-12-04 PROCEDURE — 87624 HPV HI-RISK TYP POOLED RSLT: CPT | Performed by: OBSTETRICS & GYNECOLOGY

## 2024-12-04 PROCEDURE — 99386 PREV VISIT NEW AGE 40-64: CPT | Performed by: OBSTETRICS & GYNECOLOGY

## 2024-12-05 LAB
HPV HR 12 DNA CVX QL NAA+PROBE: NEGATIVE
HPV16 DNA CVX QL NAA+PROBE: NEGATIVE
HPV18 DNA CVX QL NAA+PROBE: NEGATIVE
HUMAN PAPILLOMA VIRUS FINAL DIAGNOSIS: NORMAL

## 2024-12-05 NOTE — PROGRESS NOTES
Mireille is a 49 year old female, , LMP 2024, who is here as a new patient to the [facility] gyn dept for her annual exam.  She already had her labwork updated last July and her next mammogram will be due in 2025.  She denies any irregular periods and has a hx of breast reduction surgery at age 28.     ROS: Ten point review of systems was reviewed and negative except the above.    Health Maintenance   Topic Date Due    ADVANCE CARE PLANNING  Never done    HEPATITIS B IMMUNIZATION (1 of 3 - 19+ 3-dose series) Never done    YEARLY PREVENTIVE VISIT  2018    DTAP/TDAP/TD IMMUNIZATION (2 - Td or Tdap) 2020    HPV TEST  2023    PAP  2023    COVID-19 Vaccine ( -  season) 2024    ZOSTER IMMUNIZATION (1 of 2) 2025    BMP  2025    LIPID  2025    MAMMO SCREENING  2026    GLUCOSE  2027    COLORECTAL CANCER SCREENING  2028    RSV VACCINE (1 - 1-dose 75+ series) 2050    HEPATITIS C SCREENING  Completed    HIV SCREENING  Completed    PHQ-2 (once per calendar year)  Completed    INFLUENZA VACCINE  Completed    Pneumococcal Vaccine: Pediatrics (0 to 5 Years) and At-Risk Patients (6 to 64 Years)  Aged Out    HPV IMMUNIZATION  Aged Out    MENINGITIS IMMUNIZATION  Aged Out    RSV MONOCLONAL ANTIBODY  Aged Out      Last pap: overdue  Last Mammogram: due 2025  Last Dexa: due at age 50  Last Colonoscopy: due in 2028 given her family hx of colonic polyps    Lab Results   Component Value Date    CHOL 253 2024    CHOL 256 2018     Lab Results   Component Value Date    HDL 51 2024    HDL 66 2018     Lab Results   Component Value Date     2024     2018     Lab Results   Component Value Date    TRIG 309 2024    TRIG 357 2018     Lab Results   Component Value Date    CHOLHDLRATIO 2.8 2015     OBHX:      PSH:   Past Surgical History:   Procedure Laterality Date    BIOPSY       dermatology-moles    COLONOSCOPY  2024    No issues    COLONOSCOPY WITH CO2 INSUFFLATION N/A 12/08/2023    Procedure: Colonoscopy with CO2 insufflation;  Surgeon: Chante Shukla MD;  Location: [facility] OR    HEAD & NECK SURGERY  1990?, 2016    wisdom teeth extracted, gum tissue grafting/oral surgery    LASIK Bilateral 2008    MAMMOPLASTY REDUCTION BILATERAL  11/1997     PMH: Her past medical, surgical, and obstetric histories were reviewed and are documented in their appropriate chart areas.    ALL/Meds: Her medication and allergy histories were reviewed and are documented in their appropriate chart areas.    SH/FMH: Her social and family history was reviewed and documented in its appropriate chart area.    PE: /80   Pulse 72   Wt 80.3 kg (177 lb)   LMP 11/28/2024 (Exact Date)   SpO2 97%   Breastfeeding No   BMI 30.37 kg/m    Body mass index is 30.37 kg/m .    General Appearance:  healthy, alert, active, no distress  Cardiovascular:  Regular rate and Rhythm without murmur  Neck: Supple, no adenopathy, and thyroid normal  Lungs:  Clear, without wheeze, rale or rhonchi  Breast: No palpable mass but she has scars s/p breast reductions surgery, fibrocystic bilaterally  Abdomen: Benign, Soft, flat, non-tender, No masses, organomegaly, No inguinal nodes, and Bowel sounds normoactive.   Pelvic:       - Ext: Vulva and perineum are normal without lesion, mass or discharge        - Urethra: normal without discharge        - Urethral Meatus: normal appearance       - Bladder: no tenderness, no masses       - Vagina: Normal mucosa, no discharge        - Cervix: normal and nulliparous       - Uterus:Normal shape, position and consistency        - Adnexa: Normal without masses or tenderness       - Rectal: deferred    A/P:  Well Woman Exam, Estrogen Deficiency     -  I discussed the new pap recommendations regarding screening.  Explained the rationale for increased intervals between paps.  Questions asked and  answered.  She does agree to this regiment.  Pap was collected and submitted to pathology   -  BC: abstinence   -  She is to return in January 2025 for her DEXA scan at age 50 and this order was placed   -  She was taught and advised to perform monthly SBEs   -  Next colonoscopy is due in 12/2028    Orders Placed This Encounter   Procedures    DX Bone Density    MA Screen Bilateral w/Dwayne    HPV and Gynecologic Cytology Panel - Recommended Age 30-65 Years      -  Encouraged self-breast exam   -  Encouraged low fat diet, regular exercise, and adequate calcium intake.    Katy Charlton DO  FACOG, FACS

## 2024-12-09 LAB
BKR AP ASSOCIATED HPV REPORT: NORMAL
BKR LAB AP GYN ADEQUACY: NORMAL
BKR LAB AP GYN INTERPRETATION: NORMAL
BKR LAB AP GYN OTHER FINDINGS: NORMAL
BKR LAB AP LMP: NORMAL
BKR LAB AP PREVIOUS ABNORMAL: NORMAL
PATH REPORT.COMMENTS IMP SPEC: NORMAL
PATH REPORT.COMMENTS IMP SPEC: NORMAL
PATH REPORT.RELEVANT HX SPEC: NORMAL

## 2024-12-10 ENCOUNTER — MYC MEDICAL ADVICE (OUTPATIENT)
Dept: OBGYN | Facility: CLINIC | Age: 49
End: 2024-12-10
Payer: COMMERCIAL

## 2024-12-10 ENCOUNTER — TELEPHONE (OUTPATIENT)
Dept: OBGYN | Facility: CLINIC | Age: 49
End: 2024-12-10
Payer: COMMERCIAL

## 2024-12-10 NOTE — TELEPHONE ENCOUNTER
ZÃ¼m XR message with results sent to patient.   Awaiting reply on status of menstrual bleeding at recent visit.

## 2024-12-12 ENCOUNTER — OFFICE VISIT (OUTPATIENT)
Dept: OBGYN | Facility: CLINIC | Age: 49
End: 2024-12-12
Payer: COMMERCIAL

## 2024-12-12 VITALS
WEIGHT: 176.9 LBS | DIASTOLIC BLOOD PRESSURE: 79 MMHG | BODY MASS INDEX: 30.35 KG/M2 | OXYGEN SATURATION: 95 % | SYSTOLIC BLOOD PRESSURE: 123 MMHG | HEART RATE: 69 BPM

## 2024-12-12 DIAGNOSIS — R87.619 ENDOMETRIAL CELLS ON CERVICAL PAP SMEAR INCONSISTENT W/LMP: Primary | ICD-10-CM

## 2024-12-12 PROCEDURE — 58100 BIOPSY OF UTERUS LINING: CPT | Performed by: GENERAL PRACTICE

## 2024-12-12 PROCEDURE — 88305 TISSUE EXAM BY PATHOLOGIST: CPT | Performed by: PATHOLOGY

## 2024-12-12 NOTE — TELEPHONE ENCOUNTER
Patient reports not having spotting on day of pap collection. She will schedule endometrial biopsy. Education sent to patient through [employer].     CLARY Sutton, RN-BC

## 2024-12-12 NOTE — PATIENT INSTRUCTIONS
If you have labs or imaging done, the results will automatically release in [employer] without an interpretation.  Your health care professional will review those results and send an interpretation with recommendations as soon as possible, but this may be 1-3 business days.    If you have any questions regarding your visit, please contact your care team.     [employer]: [phone]  [facility] CLINIC HOURS TELEPHONE NUMBER   Thanh CHOE Hernando .      Elise-IVETH Villalobos-IVETH Conley-Surgery Scheduler  Tamara-         Monday-[place]  8:00 am-4:00 pm  [facility]00 [facility]  Wednesday-[place] 8:00 am-4:00 pm  Thursday-[place] 8:00 am-4:00 pm    Typical Surgery Day Friday [facility]  [address] [place] [address]  PH: [phone] Fax    Imaging Scheduling all locations  PH: [phone]     [facility]  [address]  [place], [place] [address] [phone]    [facility]  [address] [place] [address]  PH: [phone]     **Surgeries** Our Surgery Schedulers will contact you to schedule. If you do not receive a call within 3 business days, please call [phone].  Urgent Care locations:  [facility]       Monday-Friday   10 am - 8 pm  Saturday and Sunday   9 am - 5 pm   [phone]   If you need a medication refill, please contact your pharmacy. Please allow 3 business days for your refill to be completed.  As always, Thank you for trusting us with your healthcare needs!

## 2024-12-16 LAB
PATH REPORT.COMMENTS IMP SPEC: NORMAL
PATH REPORT.COMMENTS IMP SPEC: NORMAL
PATH REPORT.FINAL DX SPEC: NORMAL
PATH REPORT.GROSS SPEC: NORMAL
PATH REPORT.MICROSCOPIC SPEC OTHER STN: NORMAL
PATH REPORT.RELEVANT HX SPEC: NORMAL
PHOTO IMAGE: NORMAL

## 2024-12-17 NOTE — PROGRESS NOTES
INDICATIONS:                                                    Is a pregnancy test required: no  Was a consent obtained?  Yes    Having endometrial biopsy for  Endometrial cells noted on pap smear while not bleeding with menses    Past Medical History:   Diagnosis Date    Abnormal Pap smear 2006?    dysplasia  X 1; paps OK since then...    Arm DVT (deep venous thromboembolism), acute (H) 2008    hx with phlebitis in IVs after a surgery    Breast disorder 2016    2D mammo, then 3D & u/s to r/o = scar tissue from reduction    Cholesterol serum elevated     runs in family    Complication of anesthesia 2000?, 2016    very slow to awake from both gen. anesth. & conscious sedati    Depression     Depressive disorder 1996    under care of a psychiatrist    Encounter for insertion of Mirena IUD 2011    D/C'd w increased acne    Hypertension 2017&2018    jamin. during exercise, caused by stimulant for depression?    IBS (irritable bowel syndrome) 2002    under control, better with probiotic    Menarche age 15    cycles q 1-2 months Xs 3-4 d w bad cramps    Migraines 2008    a lot better now, may be stress related    OCD (obsessive compulsive disorder)     Seasonal allergies      Past Surgical History:   Procedure Laterality Date    BIOPSY  2000    dermatology-moles    COLONOSCOPY  2024    No issues    COLONOSCOPY WITH CO2 INSUFFLATION N/A 12/08/2023    Procedure: Colonoscopy with CO2 insufflation;  Surgeon: Chante Shukla MD;  Location: [facility] OR    COLPOSCOPY CERVIX, BIOPSY CERVIX, ENDOCERVICAL CURETTAGE, COMBINED      HEAD & NECK SURGERY  1990?, 2016    wisdom teeth extracted, gum tissue grafting/oral surgery    LASIK Bilateral 2008    MAMMOPLASTY REDUCTION BILATERAL  11/1997        Allergies   Allergen Reactions    Phenergan Vc [Promethazine-Phenylephrine] Visual Disturbance    Morphine And Codeine      IV     Current Outpatient Medications   Medication Sig Dispense Refill    buPROPion (WELLBUTRIN XL) 150 MG 24 hr tablet  Take 450 mg by mouth every morning       calcium carbonate-vitamin D (CALTRATE) 600-10 MG-MCG per tablet Take 1 tablet by mouth daily.      Cholecalciferol (D3 VITAMIN PO) Take 2,000 Units by mouth every other day.      COD LIVER OIL PO Take 1 each by mouth daily.      Efinaconazole (JUBLIA) 10 % SOLN Externally apply topically at bedtime 4 mL 11    ibuprofen (ADVIL/MOTRIN) 200 MG tablet Take 400 mg by mouth every 6 hours as needed for pain.      ketoconazole (NIZORAL) 2 % external shampoo APPLY TO AFFECTED AREA(S) THREE TIMES WEEKLY 120 mL 11    loratadine (CLARITIN) 10 MG tablet Take 10 mg by mouth daily      Multiple Vitamins-Minerals (MULTIPLE VITAMINS/WOMENS PO) Take 1 tablet by mouth daily.      rizatriptan (MAXALT-MLT) 5 MG ODT Take 1-2 tablets (5-10 mg) by mouth at onset of headache for migraine May repeat dose in 2 hours.  Do not exceed 30 mg in 24 hours 10 tablet 1    Semaglutide-Weight Management (WEGOVY) 0.25 MG/0.5ML pen Inject 0.25 mg subcutaneously once a week. May repeat dose additional 4 weeks if having side effects. 2 mL 1    Semaglutide-Weight Management (WEGOVY) 0.5 MG/0.5ML pen Inject 0.5 mg subcutaneously once a week. Start after 4 weeks of 0.25 mg Wegovy, if tolerating (start week 5) 2 mL 1    spironolactone (ALDACTONE) 50 MG tablet Take 2 tablets (100 mg) by mouth daily. 180 tablet 1    TRINTELLIX 20 MG tablet Take 20 mg by mouth daily.       No current facility-administered medications for this visit.       PE  WDWN, NAD  Non labored breathing  NEFG    PROCEDURE;                                                      A speculum was placed in the vagina and cervix prepped with betadine. A tenaculum was attached to the cervix. A small plastic 5 mm Pipelle syringe curette was inserted into the cervical canal. The uterus was sounded to 8 cm's. A vigorous four quadrant biopsy was performed, removing amount moderate  of tissue. The speculum was removed. This tissue was placed in Formalin and sent to  pathology.    The patient tolerated the procedure  well     POST PROCEDURE;                                                      There  was  no significant cramping at the time of discharge. She  tolerated the procedure well. There were no complications. Patient was discharged in stable condition.    Patient advised to call the clinic if severe pelvic pain, fever or heavy bleeding.    Thanh Villagomez, DO

## 2024-12-18 ENCOUNTER — MYC MEDICAL ADVICE (OUTPATIENT)
Dept: ENDOCRINOLOGY | Facility: CLINIC | Age: 49
End: 2024-12-18
Payer: COMMERCIAL

## 2024-12-18 DIAGNOSIS — K76.0 HEPATIC STEATOSIS: ICD-10-CM

## 2024-12-18 DIAGNOSIS — E66.811 CLASS 1 OBESITY DUE TO EXCESS CALORIES WITH SERIOUS COMORBIDITY AND BODY MASS INDEX (BMI) OF 31.0 TO 31.9 IN ADULT: Chronic | ICD-10-CM

## 2024-12-18 DIAGNOSIS — E66.09 CLASS 1 OBESITY DUE TO EXCESS CALORIES WITH SERIOUS COMORBIDITY AND BODY MASS INDEX (BMI) OF 31.0 TO 31.9 IN ADULT: Chronic | ICD-10-CM

## 2024-12-26 ENCOUNTER — VIRTUAL VISIT (OUTPATIENT)
Dept: PHARMACY | Facility: CLINIC | Age: 49
End: 2024-12-26
Attending: PHYSICIAN ASSISTANT
Payer: COMMERCIAL

## 2024-12-26 VITALS — WEIGHT: 170 LBS | BODY MASS INDEX: 29.02 KG/M2 | HEIGHT: 64 IN

## 2024-12-26 DIAGNOSIS — E66.811 CLASS 1 OBESITY DUE TO EXCESS CALORIES WITH SERIOUS COMORBIDITY AND BODY MASS INDEX (BMI) OF 31.0 TO 31.9 IN ADULT: Chronic | ICD-10-CM

## 2024-12-26 DIAGNOSIS — K76.0 HEPATIC STEATOSIS: ICD-10-CM

## 2024-12-26 DIAGNOSIS — E66.09 CLASS 1 OBESITY DUE TO EXCESS CALORIES WITH SERIOUS COMORBIDITY AND BODY MASS INDEX (BMI) OF 31.0 TO 31.9 IN ADULT: Chronic | ICD-10-CM

## 2024-12-26 NOTE — NURSING NOTE
Current patient location: [address] [facility] [address]    Is the patient currently in the state of [place]? YES    Visit mode:TELEPHONE    If the visit is dropped, the patient can be reconnected by:  [phone]    Will anyone else be joining the visit? NO  (If patient encounters technical issues they should call [phone] :[id number])    Are changes needed to the allergy or medication list? Pt stated no changes to allergies and Pt stated no med changes    Are refills needed on medications prescribed by this physician? NO    Reason for visit: Medication Therapy Management    Gaby FONSECAF    Pt wants to be called on this number  [phone] for visit.

## 2024-12-26 NOTE — Clinical Note
Mireille is doing magnificently on Wegovy! She is going to stay on Wegovy 0.5 mg for now given strong response and in with goal to mitigate costs when transitioning to compounded semaglutide in 2025. Working on lifestyle modifications, especially protein, before follow up with Dr. Plata in [place]  [place] employee - so just FYI to kk as last time I will use our CPA per [employer] insurance requirements for this patient and refills :)  Michelle

## 2024-12-26 NOTE — PATIENT INSTRUCTIONS
Recommendations from MTM Pharmacist visit:                                                    MTM (medication therapy management) is a service provided by a clinical pharmacist designed to help you get the most of out of your medicines.  You may be sent a phone or email survey evaluating today's visit.  Please provide feedback you have for the service he received today if you are able.    Continue Wegovy 0.5 mg weekly - work on lifestyle modifications to optimize weight loss.    To help with tolerability and effectiveness of Wegovy :  Eat 3 meals with protein focus daily to help with nausea. If you forget to eat, you may feel nausea as a hunger cue.  Focus on getting protein in first with each meal and snack.   A good starting goal is 60 g protein daily (track this, especially if at weight loss plateau). Once you consistently are getting 60g daily, try getting 90 g protein daily.  Drink plenty of water - goal 64 oz throughout the day  You may try Metamucil, Benefiber, or Citrucel to help feel more full (less nausea) and have softer, more consistent bowel movements.  To optimize weight management - work on incorporating resistance training/weight lifting to build muscle and improve overall metabolism of adipose tissue.      Options for continuing GLP1/GIP agonist in 2025:  Pay out of pocket for Wegovy or Zepbound pens (>$1000/month cash price without savings card)   Zepbound cost with Zepbound savings card (link below to sign up for this): $650/month with card  https://[email or web address]/enroll/checkEnrollment  Wegovy cost with Wegovy savings card (link below to sign up for this): $650/month with card   https://www.[employer].MeterHero/coverage-and-savings/save-on-wegovy.html  Compounded semaglutide (same active ingredient as Wegovy) from [facility] ($230/month for doses of 1mg or less; $370/month for doses higher than 1mg)  This is an available option for as long as Wegovy is on FDA shortage  "list, Wegovy has been on the list for the last several months with no foreseeable end in sight as of now   Zepbound Vials through [facility] - vials only available in 2.5 mg and 5 mg doses  $399/month for 2.5mg vials  $549/month for 5mg vials   $5 per month for administrations supplies (syringe/needles, etc)       Follow-up:   Appointments in Next Year      Jan 24, 2025 12:40 PM  (Arrive by 12:25 PM)  Return Weight Management Visit with Christopher Plata MD  [facility] [place] ([facility] [place] ) [phone]     Mar 18, 2025 1:00 PM  Pharmacist Visit with Michelle Miner RPH  [facility] Multiple Specialty [facility] ([facility] [place] ) [phone]                It was great speaking with you today.  I value your experience and would be very thankful for your time in providing feedback in our clinic survey. In the next few days, you may receive an email or text message from [employer] with a link to a survey related to your  clinical pharmacist.\"     To schedule another MTM appointment, please call the clinic directly ([facility] Phone Number: [phone] (schedules for [facility] and [facility] - providers, dietitians, health coaches) or you may call the MTM scheduling line at [phone] or toll-free at [phone].     My Clinical Pharmacist's contact information:                                                      Please feel free to contact me with any questions or concerns you have.      Michelle Miner, Pharm D., MPH    Medication Therapy Management Pharmacist   [facility]          Meal Replacement Shake Options:   *Protein Shake Criteria: no more than 210 Calories, at least 20 grams of protein, and less than 10 grams of sugar   Premier Protein (160 Calories, 30 g protein)  Slim Fast Advanced Nutrition " (180 Calories, 20 g protein)  Muscle Milk, lactose-free, 17 oz bottle (210 Calories, 30 g protein)  Integrated Supplements, no artificial sugars (110 Calories, 20 g protein)  Boost/Ensure Max (160 calories, 30 gm protein)   Fairlife Protein Shakes (160-230 calories, 26-42 gm protein)  Aldi's [facility] Protein Powder (180 calories, 30 gm protein)   Orgain Protein Shakes (130-160 calories, 20-26 gm protein)     Meal Replacement Bar Options:  Quest Protein Bars (190 Calories, 20 g protein)  Built Bar (170 Calories, 15-20 g protein)  One Protein Bar (210 calories, 20 g protein)  Troy Signature Protein Bar ([employer]) (190 Calories, 21 g protein)  Pure Protein Bars (180 Calories, 21 g protein)    Low Calorie Frozen Meal:  Healthy Choice Power Bowls  Lean Christopher  Smart Ones  Paulo Dominguez      ---------------------------------------------------------------  Tips to Increase the Protein in Your Diet  You may need more protein in your diet to help you heal from an illness, surgery or wound. Extra protein can also help you gain weight. Here are some ideas for adding high-protein foods to your meals.  Meat and fish  Add chopped cooked meat to vegetables, salads, casseroles, soups, sauces and biscuit dough.  Use in omelets, soufflés, quiches, sandwich fillings and chicken or turkey stuffing.  Wrap in pie crust or biscuit dough to make a turnover.  Add to stuffed baked potatoes.  Make a dip with diced meat or flaked fish mixed with sour cream and spices.  Chopped, hard-cooked eggs  Add to salads.  Use for snacks and sandwich filling.  High-protein milk  To make high-protein milk, mix 1 quart whole milk with 1 cup powdered milk.  Add to cream soups, mashed potatoes, scrambled eggs, cereals and dried eggnog mix.  Use as an ingredient in puddings, custards, hot chocolate, milk shakes and pancakes.  Powdered milk  If you don't have any high-protein milk on hand, you can use powdered milk. Add 3 tablespoons to:  gravies,  sauces, cream soups, mayonnaise  casseroles, meat patties, meatloaf, tuna salad, deviled ham  scalloped or mashed potatoes, creamed spinach  scrambled eggs, egg salad  cereals  yogurt, milk drinks, ice cream, frozen desserts, puddings, custards.  Add 4 to 6 tablespoons powdered milk to make:  cream sauces  breads, muffins, pancakes, waffles, cookies, cakes  cream pies, frostings, cake fillings  fruit cobblers, bread or rice pudding, gelatin desserts.  For high-protein eggnog, add 3 to 6 tablespoons powdered milk to prepared eggnog.  Hard or soft cheese  Melt on sandwiches, breads, tortillas, hamburgers, hot dogs, other meats, vegetables, eggs and pies.  Grate into soups, chili, sauces, casseroles, vegetables, potatoes, rice, noodles or meatloaf.  Eat with toast or crackers, or melt for adelso dip.  Cottage cheese or ricotta cheese  Mix with or scoop on top of fruits and vegetables.  Add to casseroles, lasagna, spaghetti, noodles and egg dishes (omelets, scrambled eggs, soufflés).  Use in gelatin, pudding-type desserts, cheesecake and pancake batter.  Use to stuff crepes, pasta shells or manicotti.  Fruit yogurt  Blend with fruits for a fruit smoothie.  Use as a dip for fruits and vegetables.  Scoop on top of pancakes or waffles.  Tofu  Blend silken tofu with fruits and juices for a smoothie.  Add chunks of firm tofu to soups and stews, or crumble into meatloaf.  Blend dried onion soup mix into soft or silken tofu for dip.  Use pureed silken tofu for part of the mayonnaise, sour cream, cream cheese or ricotta cheese called for in recipes.  Beans  Use cooked beans or peas in soups, casseroles, pasta, tacos and burritos.  Nuts and seeds  Note: For children under 3, discuss with the child's care team.  Use in casseroles, breads, muffins, pancakes, cookies and waffles.  Sprinkle on fruits, cereals, ice cream, yogurt, vegetables and salads.  Mix with raisins, dried fruits and chocolate chips for a snack.  Nut  butters  Note: For children under 3, discuss with the child's care team.  Spread on sandwiches, toast, muffins, crackers, waffles, pancakes and fruit slices.  Use as a dip for raw vegetables.  Blend with milk drinks, or swirl through ice cream, yogurt or hot cereal.  Nutrition supplements (nutrition bars, drinks and powders)  Add powders to milk drinks and desserts.  Mix with ice cream, milk and fruit for a high-protein milk shake.    For informational purposes only. Not to replace the advice of your health care provider. Clinically reviewed by Lisa Martinez, ÁLVARO, LD, and the Clinical Nutrition Service Line. Copyright   2005 [facility]. All rights reserved. [employer] 425250 - REV 04/24.      -----------------------------------------------------------------------------------------------------------------  Learning About High-Protein Foods  What foods are high in protein?     The foods you eat contain nutrients, such as vitamins and minerals. Protein is a nutrient. Your body needs the right amount to stay healthy and work as it should. You can use the list below to help you make choices about which foods to eat.  Here are some examples of foods that are high in protein.  Dairy and dairy alternatives  Cheese  Milk  Soy milk  Yogurt (especially Greek)  Meat  Beef  Chicken  Ham  Lamb  Lunch meat  Pork  Sausage  Turkey  Other protein foods  Beans (black, garbanzo, kidney, lima)  Eggs  Hummus  Lentils  Nuts  Peanut butter and other nut butters  Peas  Soybeans  Tofu  Veggie or soy robson (Check the nutrition label for the amount of protein in each serving.)  Seafood  Anchovies  Cod  Crab  Halibut  Linneus  Sardines  Shrimp  Tilapia  [place]  Tuna  Protein supplements  Bars (Check the nutrition label for the amount of protein in each serving.)  Drinks  Powders  Work with your doctor to find out how much of this nutrient you need. Depending on your health, you may need more or less of it in your diet.  Where  "can you learn more?  Go to https://www.healthArran [employer]romatics.net/patiented  Enter P335 in the search box to learn more about \"Learning About High-Protein Foods.\"  Current as of: September 20, 2023               Content Version: 14.0    4527-2956 [employer], Diagnose.me.   Care instructions adapted under license by your healthcare professional. If you have questions about a medical condition or this instruction, always ask your healthcare professional. [employer], Diagn[employer].me disclaims any warranty or liability for your use of this information.         "

## 2024-12-26 NOTE — PROGRESS NOTES
Medication Therapy Management (MTM) Encounter    ASSESSMENT:                            Medication Adherence/Access: education provided today of [facility] insurance requirements changing - stopping GLP1/GIP Agonist coverage for weight management in 2025.   Discussed options for continuing GLP1/GIP agonist in 2025.     Medication Therapy Management contacted [facility] for override to fill one more box Wegovy 0.5 mg (see dose assessment below) to get 90 day (3 month) supply before year end insurance changes (in addition to 2 boxes sent 12/23/24)    Weight management :    Patient congratulated on >6% total body weight loss and lifestyle modifications.     Patient would benefit from continuing pharmacotherapy for weight management. Given tolerating well, overweight, recommend optimizing GLP1/GIP therapy as data to support most significant weight loss and patient has no contraindications. Patient also realizing benefit from reduction in food noise and increased satiety. Education provided on continued dietary and behavioral modifications.     Due to [facility] insurance discontinuing coverage of GLP1 agonists for Weight Management in 2025 year, much of today's discussion was spent discussing next steps including alternative injectable options - paying out of pocket w/ savings card cost, compound product through [facility], and others. Discussed risk/benefit of continuing high dose GLP1/GIP agonist  or tapering to lower risk of weight regain/cycling as suggested from some studies. Patient also not meeting protein and water goals to optimize weight management and given significant initial response, likely sensitive to semaglutide - given this, plans to continue Wegovy 0.5 mg weekly at this time.    From all above, patient wishing to transition to compound semaglutide through [facility] after current supply of Wegovy is out.         PLAN:                             Continue Wegovy 0.5 mg weekly - work on lifestyle modifications to optimize weight loss.    To help with tolerability and effectiveness of Wegovy :  Eat 3 meals with protein focus daily to help with nausea. If you forget to eat, you may feel nausea as a hunger cue.  Focus on getting protein in first with each meal and snack.   A good starting goal is 60 g protein daily (track this, especially if at weight loss plateau). Once you consistently are getting 60g daily, try getting 90 g protein daily.  Drink plenty of water - goal 64 oz throughout the day  You may try Metamucil, Benefiber, or Citrucel to help feel more full (less nausea) and have softer, more consistent bowel movements.  To optimize weight management - work on incorporating resistance training/weight lifting to build muscle and improve overall metabolism of adipose tissue.      Options for continuing GLP1/GIP agonist in 2025:  Pay out of pocket for Wegovy or Zepbound pens (>$1000/month cash price without savings card)   Zepbound cost with Zepbound savings card (link below to sign up for this): $650/month with card  [email or web address]/[email or web address]  Wegovy cost with Wegovy savings card (link below to sign up for this): $650/month with card   https://www.SDNsquare/coverage-and-savings/save-on-wegovy.html  Compounded semaglutide (same active ingredient as Wegovy) from [facility] ($230/month for doses of 1mg or less; $370/month for doses higher than 1mg)  This is an available option for as long as Wegovy is on FDA shortage list, Wegovy has been on the list for the last several months with no foreseeable end in sight as of now   Zepbound Vials through [facility] - vials only available in 2.5 mg and 5 mg doses  $399/month for 2.5mg vials  $549/month for 5mg vials   $5 per month for administrations supplies (syringe/needles, etc)       Follow-up:   Appointments in Next Year      Jan 24,  2025 12:40 PM  (Arrive by 12:25 PM)  Return Weight Management Visit with Christopher Plata MD  [facility] [place] ([facility] ) [phone]     Mar 18, 2025 1:00 PM  Pharmacist Visit with Michelle Miner RPH  [facility] Multiple Specialty [facility] ([facility] [place] ) [phone]            SUBJECTIVE/OBJECTIVE:                          Mireille Walls is a 49 year old female seen for a follow-up visit.       Reason for visit: weight management Medication Therapy Management .    Allergies/ADRs: Reviewed in chart  Past Medical History: Reviewed in chart  Tobacco: She reports that she has never smoked. She has never used smokeless tobacco.  Alcohol: rare - socially    Medication Adherence/Access: Medication barriers: obtaining medication from insurance. 2025 [facility] insurance requirements not covering GLP1/GIP agonist for weight management and cost a concern.    12/23/24 received     Weight Management   Wegovy 0.5 mg weekly x 4 weeks  Bupropion  mg once daily (for mental health)     [facility] Visit with Dr. Plata,Christopher Valles 6/28/24  Registered dietician visit with Juliana Crane, Registered Dietician 7/16/24    No side effects - occasionally indigestion with heavy/greasy foods - treats with Tums or Pepto Bismol, but overall managing well with lifestyle modifications.  Lots of weight loss initially, appetite improvement -- now some weight loss stalling and wonders why.    Patient reports working overnight - gets hungry on the way home and will go through drive thru - she has not had this craving at all since starting Wegovy.      Nutrition/Eating Habits: working to limit carbohydrates, focus on protein and eating more regularly with more protein and smaller portions. Doesn't track protein currently, but does eat protein first in all meals.    Water:  40-60 oz per day     Exercise/Activity:  "walks a lot at work - many days >10,000K steps.     Medications Tried/Failed:  GLP1/GIP agonist : Patient denies personal or family history of MEN Type2, MTC, Pancreatitis.   Abstinent - no current pregnancy risk; patient acknowledges understanding of this risk.      History    Weight gain Especially after menopause and depression. 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 Depression also contributes to overeating/relationship with food and fatigue. Hopeful for some improved relationship with food and more energy to help feel better with clothing and overall    Initial Consult Weight: 182 lb         Current weight today: 170 lbs 0 oz  Cumulative Weight Loss: -12 lb, -6.6 % from baseline    Wt Readings from Last 4 Encounters:   12/26/24 170 lb (77.1 kg)   12/12/24 176 lb 14.4 oz (80.2 kg)   12/04/24 177 lb (80.3 kg)   11/02/24 180 lb 12.8 oz (82 kg)     Estimated body mass index is 29.17 kg/m  as calculated from the following:    Height as of this encounter: 5' 4.02\" (1.626 m).    Weight as of this encounter: 170 lb (77.1 kg).        Today's Vitals: Ht 5' 4.02\" (1.626 m)   Wt 170 lb (77.1 kg)   LMP 11/28/2024 (Exact Date)   BMI 29.17 kg/m    ----------------      I spent 21 minutes with this patient today. All changes were made via collaborative practice agreement with Jaky Keenan.     A summary of these recommendations was sent via [facility].    Michelle Miner, Pharm D., MPH    Medication Therapy Management Pharmacist   [facility]      Telemedicine Visit Details  The patient's medications can be safely assessed via a telemedicine encounter.  Type of service:  Telephone visit  Originating Location (pt. Location): Other work     Distant Location (provider location):  Off-site  Start Time:  11:31 AM  End Time: 11:52 AM     Medication Therapy Recommendations  Class 1 obesity due to excess calories with serious comorbidity and body mass index (BMI) of 31.0 to 31.9 in adult   1 Current " Medication: Semaglutide-Weight Management (WEGOVY) 0.5 MG/0.5ML pen   Current Medication Sig: Inject 0.5 mg subcutaneously once a week. Start after 4 weeks of 0.25 mg Wegovy, if tolerating (start week 5)   Rationale: Medication product not available - Adherence - Adherence   Recommendation: Provide Adherence Intervention   Status: Accepted per CPA   Identified Date: 12/26/2024 Completed Date: 12/26/2024

## 2025-01-14 ENCOUNTER — PATIENT OUTREACH (OUTPATIENT)
Dept: CARE COORDINATION | Facility: CLINIC | Age: 50
End: 2025-01-14
Payer: COMMERCIAL

## 2025-01-27 ENCOUNTER — TELEPHONE (OUTPATIENT)
Dept: ENDOCRINOLOGY | Facility: CLINIC | Age: 50
End: 2025-01-27
Payer: COMMERCIAL

## 2025-01-27 NOTE — TELEPHONE ENCOUNTER
Sent [employer] (1st Attempt) and Patient Contacted for the patient to call back and schedule the following:    Appointment type: Return Weight Management  Appointment mode: In Person or Virtual Visit  Provider: Dr. Christopher Plata  Return date: Approx. 5/24/25 or next available  Specialty phone number: [phone]    Additional Notes:

## 2025-03-05 ENCOUNTER — ANCILLARY PROCEDURE (OUTPATIENT)
Dept: BONE DENSITY | Facility: CLINIC | Age: 50
End: 2025-03-05
Attending: OBSTETRICS & GYNECOLOGY
Payer: COMMERCIAL

## 2025-03-05 DIAGNOSIS — E28.39 ESTROGEN DEFICIENCY: ICD-10-CM

## 2025-03-05 PROCEDURE — 77080 DXA BONE DENSITY AXIAL: CPT | Performed by: RADIOLOGY

## 2025-03-18 ENCOUNTER — VIRTUAL VISIT (OUTPATIENT)
Dept: PHARMACY | Facility: CLINIC | Age: 50
End: 2025-03-18
Attending: INTERNAL MEDICINE
Payer: COMMERCIAL

## 2025-03-18 VITALS — HEIGHT: 64 IN | BODY MASS INDEX: 26.63 KG/M2 | WEIGHT: 156 LBS

## 2025-03-18 DIAGNOSIS — Z86.39 HISTORY OF OBESITY: Primary | ICD-10-CM

## 2025-03-18 NOTE — NURSING NOTE
Current patient location: [address] [place] [id number]    Is the patient currently in the state of [place]? YES    Visit mode:TELEPHONE    If the visit is dropped, the patient can be reconnected by: TELEPHONE VISIT: Phone number: [phone]    Will anyone else be joining the visit? NO  (If patient encounters technical issues they should call [phone]    Are changes needed to the allergy or medication list? Pt stated no changes to allergies and Pt stated no med changes    Are refills needed on medications prescribed by this physician? Not sure, pt will talk to provider about medications    Reason for visit: Medication Therapy Management    Gaby Millere VVF

## 2025-03-18 NOTE — PROGRESS NOTES
Medication Therapy Management (MTM) Encounter    ASSESSMENT:                            Medication Adherence/Access: education provided today of [facility] insurance requirements changing - stopping GLP1/GIP Agonist coverage for weight management in 2025.   Discussed options for continuing GLP1/GIP agonist in 2025.     Weight management : discussed maintenance with GLP1/GIP agonist today - patient to continue on semaglutide 0.5mg weekly given patient close to goal weight and feels this dose works well for her - no side effects and goal to optimize lowest effective dose.    Due to [facility] insurance discontinuing coverage of GLP1 agonists for Weight Management in 2025 year, much of today's discussion was spent discussing next steps including alternative injectable options - paying out of pocket w/ savings card cost, compound product through [facility], and others. From this, patient wishing to transition to compound semaglutide through [facility] after current supply of Wegovy  is out. After this will likely pursue Wegovy from [employer] going forward given [facility] no longer able to compound semaglutide after 4/22/25.    Completed teaching of administration of compounded semaglutide . Discussed mechanism of action, side effects, warnings, and efficacy. Discussed appropriate storage and stability. Answered patient questions.       PLAN:                            Transition to compounded semaglutide 0.5mg (10 units) weekly on Sundays for March and April. (See injection instructions below)     Continue to work on focus of protein and water intake as discussed to help with safety and efficacy of semaglutide/Wegovy.    Follow-up:  at next visit with Jazzy JoyD in May we will discuss out of pocket costs for continuing a weekly injection for weight management (see options and costs below)    Options for continuing GLP1/GIP agonist in  2025:  Wegovy or Zepbound pens out of pocket cost (>$1000/month cash price without savings card)   Zepbound  Cost of any dose with savings card (link below to sign up): $650/month with card at any pharmacy   https://[email or web address].ikaSystems/enroll/checkEnrollment - Medicaid, Medicare or other government insurances cannot use savings cards  Wegovy   Cost of any dose with savings card (link below to sign up): $650/month with card at any pharmacy   https://www.Copious/coverage-and-savings/save-on-wegovy.html - Medicaid, Medicare or other government insurances cannot use savings cards  Cost for any dose through [facility]: $499/month - only for uninsured or have commercial insurance. This is [facility]'s mail order pharmacy:   https://www.Roobiq/obesity/products/wegovy/get-product.html    Zepbound Vials through [facility] - vials only available in 2.5 mg, 5 mg, 7.5mg, 10 mg doses  [email or web address]/zepbound  $349/month for 2.5 mg vials  $499/month for 5 mg vials   7.5 mg and 10 mg vials now available for $499/month with regular refills (cost increases if refills not consistently filled at least every 45 days - see more info at [employer] website)  Additional $5 per month for administrations supplies (syringe/needles, etc)      Appointments in Next Year      May 05, 2025 1:00 PM  Pharmacist Visit with Michelle Miner RPH  [facility] ([facility] ) [phone]     Oct 03, 2025 11:40 AM  (Arrive by 11:25 AM)  Return Weight Management Visit with Christopher Plata MD  [facility] [place] ([facility] [place] ) [phone]            SUBJECTIVE/OBJECTIVE:                          Mireille Walls is a 50 year old female seen for a follow-up visit.       Reason for visit: Medication Therapy Management - weight management Irene  employee transition .    Allergies/ADRs: Reviewed in chart  Past Medical History: Reviewed in chart  Tobacco: She reports that she has never smoked. She has never used smokeless tobacco.  Alcohol: Social drinker, rare occassions     Medication Adherence/Access: Medication barriers: obtaining medication from insurance. [facility] insurance requirements no longer covering GLP1 for obesity in 2025.     Education provided compounded semaglutide from [facility] will be ending 4/22/25    Weight Management   Wegovy 0.5 mg weekly (last dose was Sunday - now transitioning to compounded semaglutide this week - prescription sent by Dr. Plata in Jan with 5 refills)    Bupropion  mg once daily (for mental health)     return Medical Weight Managment Visit with Christopher Pittman 1/24/25  Registered dietician visit with Juliana Crane, Registered Dietician 7/16/24    No side effects. Has been very pleased with results - nutrition improved. Pleased with semaglutide - wondering how to transition from an affordability standpoint? Would like to maintain semaglutide at this time if affordable?    Patient reports working overnight - gets hungry on the way home and will go through drive thru - she has not had this craving at all since starting Wegovy.      Nutrition/Eating Habits: working to limit carbohydrates, focus on protein and eating more regularly with more protein and smaller portions.    Drinks 3 glassed of milk per day to help meet nutrition    Works nights  Breakfast: Special K with milk, 1/2 caramel roll (brunch usually)  Lunch: Glass of milk  Dinner: 1/4 of sub sandwich, 1 glass of milk  Snacks: used to stop for fast food after work, now will have a glass of milk    Water:  40-60 oz per day - this is difficult for patient, but working on this     Exercise/Activity: walks a lot at work - many days >10,000K steps.     Medications Tried/Failed:  GLP1/GIP agonist : Patient denies personal or family  "history of MEN Type2, MTC, Pancreatitis.   Abstinent - no current pregnancy risk; patient acknowledges understanding of this risk.      History    Weight gain Especially after menopause and depression. 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 Depression also contributes to overeating/relationship with food and fatigue. Hopeful for some improved relationship with food and more energy to help feel better with clothing and overall    Initial Consult Weight: 182 lb         Current weight today: 156 lbs 0 oz  Cumulative Weight Loss: -26 lb, -14% from baseline    Wt Readings from Last 4 Encounters:   03/18/25 156 lb (70.8 kg)   01/24/25 162 lb (73.5 kg)   12/26/24 170 lb (77.1 kg)   12/12/24 176 lb 14.4 oz (80.2 kg)     Estimated body mass index is 26.76 kg/m  as calculated from the following:    Height as of this encounter: 5' 4.02\" (1.626 m).    Weight as of this encounter: 156 lb (70.8 kg).        Today's Vitals: Ht 5' 4.02\" (1.626 m)   Wt 156 lb (70.8 kg)   BMI 26.76 kg/m    ----------------      I spent 17 minutes with this patient today. All changes were made via collaborative practice agreement with Christopher Plata.     A summary of these recommendations was sent via [employer].    Michelle Miner, Pharm D., MPH    Medication Therapy Management Pharmacist   [facility]     Telemedicine Visit Details  The patient's medications can be safely assessed via a telemedicine encounter.  Type of service:  Telephone visit  Originating Location (pt. Location): Home    Distant Location (provider location):  Off-site  Start Time: 1:01 PM  End Time: 1:18 PM     Medication Therapy Recommendations  No medication therapy recommendations to display   "

## 2025-03-18 NOTE — PATIENT INSTRUCTIONS
Recommendations from today's MTM visit:                                                    MTM (medication therapy management) is a service provided by a clinical pharmacist designed to help you get the most of out of your medicines.   Today we reviewed what your medicines are for, how to know if they are working, that your medicines are safe and how to make your medicine regimen as easy as possible.        Transition to compounded semaglutide 0.5mg (10 units) weekly on Sundays for March and April. (See injection instructions below)     Continue to work on focus of protein and water intake as discussed to help with safety and efficacy of semaglutide/Wegovy.    Follow-up:  at next visit with Michelle Miner PharmD in May we will discuss out of pocket costs for continuing a weekly injection for weight management (see options and costs below)    Options for continuing GLP1/GIP agonist in 2025:  Wegovy or Zepbound pens out of pocket cost (>$1000/month cash price without savings card)   Zepbound  Cost of any dose with savings card (link below to sign up): $650/month with card at any pharmacy   https://www.enrollment.zepbEmpower Energies Inc..Zoomaal/enroll/checkEnrollment - Medicaid, Medicare or other government insurances cannot use savings cards  Wegovy   Cost of any dose with savings card (link below to sign up): $650/month with card at any pharmacy   https://www.Nangate/coverage-and-savings/save-on-wegovy.html - Medicaid, Medicare or other government insurances cannot use savings cards  Cost for any dose through [facility]: $499/month - only for uninsured or have commercial insurance. This is [facility]'s mail order pharmacy:   https://www.MaistorPlus.[employer]/obesity/products/wegovy/get-product.html    Zepbound Vials through [facility] - vials only available in 2.5 mg, 5 mg, 7.5mg, 10 mg doses  https://[email or web address] [email or web address]/pharmacy/zepbound  $349/month for 2.5 mg vials  $499/month for 5 mg vials   7.5  "mg and 10 mg vials now available for $499/month with regular refills (cost increases if refills not consistently filled at least every 45 days - see more info at [employer] [facility] website)  Additional $5 per month for administrations supplies (syringe/needles, etc)      Appointments in Next Year      May 05, 2025 1:00 PM  Pharmacist Visit with Michelle Miner RPH  [facility] MTM ([facility] ) [phone]     Oct 03, 2025 11:40 AM  (Arrive by 11:25 AM)  Return Weight Management Visit with Christopher Plata MD  [facility] [place] ([facility] ) [phone]            It was great speaking with you today.  I value your experience and would be very thankful for your time in providing feedback in our clinic survey. In the next few days, you may receive an email or text message from [employer] with a link to a survey related to your  clinical pharmacist.\"     To schedule another MTM appointment, please call the clinic directly or you may call the MTM scheduling line at [phone].    My Clinical Pharmacist's contact information:                                                      Please feel free to contact me with any questions or concerns you have.      Michelle iMner, Pharm D., MPH    Medication Therapy Management Pharmacist   [facility]      Compound Semaglutide at [facility]  [facility] is offering compounded semaglutide during the time of Wegovy/Ozempic national shortage. Semaglutide is the generic name of Wegovy (for Weight Management) and Ozempic (for Type 2 Diabetes). [facility] is following the highest standards for sterility and compounding practices. Compounding of semaglutide is legal for as long as Wegovy/Ozempic are on the FDA's drug shortage list. When Wegovy/Ozempic are taken off the FDA's " "shortage list, compounding semaglutide will no longer be available/legal. Pharmacy can provide 1 last refill up to 1 month from resolution of shortage date on FDA drug shortage list. When this occurs, patients will have to turn to acquiring Wegovy via its available manufactured pen, look into alternative weight loss medication(s), or stop the medication. Due to high demand of compound semaglutide, orders may take 1-2 weeks to obtain from time of prescribing.     As with any weight loss medication(s), there is a risk of weight regain should you stop semaglutide. It is important to be aware of this risk should you stop compounded semaglutide with no plans to transition back to an alternative injectable option. The use of semaglutide as a weight management medication, is intended for long term use.     Obtaining Medication From Pharmacy:   Automated call will contact patient when pharmacy has received RX. Patient must call the [facility] back to speak directly to team member prior to shipping medication to verify patient name, product, shipping, and cost. During this call, pharmacy technician will verify if needles/syringes will be needed and can be added to order for $5 additional cost per 10 unit syringe packs. Vials of compounded semaglutide [facility] your home in a refrigerated box. The vial you will be given is a multi-use vial, meaning that you will use the same vial during the next 28 days for each of your injections. Use a new syringe for each injection. The syringe that will be used to draw up your dose is a \"unit\" syringe, meaning your dose will have an associated \"mg\" and \"units\". Please see your prescription and the below information to verify the dose you should be injecting in UNITS prior to injection.     Storage:  Keep your medication stored in the refrigerator. The vials are to be discarded 28 days after opening (even if there is remaining medication in " "the vial).     Do not pre-fill syringes from the multi-use vial for future use. Sterility cannot be verified. You should only be drawing up the amount needed for the injection that day, then placing vial back into refrigerator for storage for next injection.     Common Side Effects:  Side effect profile is the same as Wegovy. Monitor for nausea, diarrhea, constipation, headache, indigestion, tiredness (fatigue), stomach upset or abdominal pain. Less commonly, semaglutide can cause low blood sugar (symptoms: shaky, dizzy, sweaty, agitation). Please reach out to the care team should you feel like this is occurring. It is important to ensure that you are eating consistent meals and not skipping meals. Ensure you are getting at least 64 oz water daily. If any side effects become unmanageable, contact the care team immediately. See Wegovy package insert for rare but serious side effects, contraindications and warnings.     Dosing:  Every 4 weeks you will have the opportunity/option to increase your dose. However, therapy is individualized for each patient. You should discuss any potential dose changes with your provider first.     Doses available for compound semaglutide: 0.25 mg, 0.5 mg, 1 mg, 1.5 mg, 1.7 mg, 2 mg, 2.4 mg and 2.5 mg.     As previously stated, you will draw up the associated \"unit\" to the your \"mg\" dose prescribed. Please see your provider's recommendations, your prescription and the below table to verify the number of \"units\" you should be drawing up in syringe for your dose. Example, if you are prescribed compound semaglutide 0.5 mg, you will draw up 10 units in the syringe. Use a new syringe for each injection.     *If you are having nausea or other side effects to where you are hesitant to move up to the next dose, stay at the same dose you are on for an additional 2-4 weeks to see if side effect(s) improve/resolve. Make sure to take this time to hydrate and utilizing smaller more consistent meals, " such as 4-6 small meals per day.  It may be advantageous to stay at the same dose if you are seeing good efficacy (both on and off the scale) and having minimal/manageable side effects. If you do not have additional refills on that dose's prescription, please reach out to the clinic.    Mg to Unit Conversion Chart for Reference:         Administration:  Follow the instructions to fill the syringe with medicine. Instructions can be accessed at [email or web address] or by scanning this QR code-    Follow the instructions to inject your medication. Instructions can be accessed at [email or web address]  or by scanning this QR code-      Syringe Disposal:   Place the used syringe in a sharps container. You can buy a sharps container at your local pharmacy.   If you don't have a sharps container, you can use a plastic detergent container with a lid.   Visit Learning About Safe Needle Use and Disposal (Tractive.Berg) and safeneedledisposal.org for more information.     Cost:   $230 per month for doses 1 mg or less (one 1 mL vial), $370 per month for doses higher than 1 mg (two 1 mL vials)   Optional $5 per 10 pack unit needle/syringe, no additional RX required    [facility] Phone:  [phone]    States to which [facility] is able to ship to: [place], [place], [place], [place], [place], [place]

## 2025-03-18 NOTE — Clinical Note
Mireille is doing GREAT with semaglutide 0.5mg weekly!! Feels she is close to maintenance and will continue this dose for now and focus on nutrition.  Out of pocket costs will be the barrier - but she thinks after [facility] not able to provide compounded semaglutide, will  plant to pay $500/mo with [facility] pharmacy to get this. She and I are meeting in May to discuss this transition -- I will provide updates at that time!  Michelle

## 2025-04-16 ENCOUNTER — OFFICE VISIT (OUTPATIENT)
Dept: URGENT CARE | Facility: URGENT CARE | Age: 50
End: 2025-04-16
Payer: COMMERCIAL

## 2025-04-16 VITALS
DIASTOLIC BLOOD PRESSURE: 74 MMHG | TEMPERATURE: 98.3 F | SYSTOLIC BLOOD PRESSURE: 108 MMHG | HEART RATE: 66 BPM | OXYGEN SATURATION: 97 % | RESPIRATION RATE: 18 BRPM | WEIGHT: 152 LBS | BODY MASS INDEX: 26.08 KG/M2

## 2025-04-16 DIAGNOSIS — J01.00 ACUTE NON-RECURRENT MAXILLARY SINUSITIS: Primary | ICD-10-CM

## 2025-04-16 PROCEDURE — 3078F DIAST BP <80 MM HG: CPT | Performed by: PHYSICIAN ASSISTANT

## 2025-04-16 PROCEDURE — 99213 OFFICE O/P EST LOW 20 MIN: CPT | Performed by: PHYSICIAN ASSISTANT

## 2025-04-16 PROCEDURE — 3074F SYST BP LT 130 MM HG: CPT | Performed by: PHYSICIAN ASSISTANT

## 2025-04-16 NOTE — LETTER
April 16, 2025      Mireille Walls  [address]  [place] [address]        To Whom It May Concern:    Mireille Walls  was seen on April 16, 2025 .  Please excuse her  until 4/18/25  due to illness.        Sincerely,        Rashad Randhawa PA-C    Electronically signed

## 2025-04-16 NOTE — PROGRESS NOTES
Chief Complaint   Patient presents with    Urgent Care    URI     Per patient states this is day 10 of symptoms sinus pressure/pain, post nasal drip, cough- productive, fatigued, states it started with a sore throat but that has completely gone away, patient does work in a hospital.        ASSESSMENT/PLAN:  Mireille was seen today for urgent care and uri.    Diagnoses and all orders for this visit:    Acute non-recurrent maxillary sinusitis  -     amoxicillin-clavulanate (AUGMENTIN) 875-125 MG tablet; Take 1 tablet by mouth 2 times daily for 7 days.    Overall patient has been improving and do suspect this is more of a viral sinusitis/URI.  Discussed if any secondary sickening develops would recommend starting Augmentin otherwise continue with supportive management.  Discussed contagiousness.  Work note given    Rashad Randhawa PA-C      SUBJECTIVE:  Mireille is a 50 year old female who presents to urgent care with 10 days of illness.  Overall her symptoms have started to improve but this is lasting longer than her usual illnesses.  She developed sinus pain, pressure, purulent discharge from the nostrils, sore throat and cough.  Has some fatigue and malaise.  No fevers or chills.  No shortness of breath or chest pain.    ROS: Pertinent ROS neg other than the symptoms noted above in the HPI.     OBJECTIVE:  /74   Pulse 66   Temp 98.3  F (36.8  C) (Oral)   Resp 18   Wt 68.9 kg (152 lb)   LMP 04/14/2025   SpO2 97%   BMI 26.08 kg/m     GENERAL: alert and no distress  EYES: Eyes grossly normal to inspection, PERRL and conjunctivae and sclerae normal  HENT: ear canals and TM's normal, nose and mouth without ulcers or lesions, mild oropharynx erythema  RESP: lungs clear to auscultation - no rales, rhonchi or wheezes  CV: regular rate and rhythm, normal S1 S2, no S3 or S4, no murmur, click or rub, no peripheral edema     DIAGNOSTICS    No results found for any visits on 04/16/25.     Current Outpatient Medications    Medication Sig Dispense Refill    amoxicillin-clavulanate (AUGMENTIN) 875-125 MG tablet Take 1 tablet by mouth 2 times daily for 7 days. 14 tablet 0    buPROPion (WELLBUTRIN XL) 150 MG 24 hr tablet Take 450 mg by mouth every morning       calcium carbonate-vitamin D (CALTRATE) 600-10 MG-MCG per tablet Take 1 tablet by mouth daily.      Cholecalciferol (D3 VITAMIN PO) Take 2,000 Units by mouth every other day.      COD LIVER OIL PO Take 1 each by mouth daily.      COMPOUNDED NON-CONTROLLED SUBSTANCE (CMPD RX) - PHARMACY TO MIX COMPOUNDED MEDICATION Inject 0.5 mg semaglutide subcutaneously weekly 4 each 5    Efinaconazole (JUBLIA) 10 % SOLN Externally apply topically at bedtime 4 mL 11    ibuprofen (ADVIL/MOTRIN) 200 MG tablet Take 400 mg by mouth every 6 hours as needed for pain.      ketoconazole (NIZORAL) 2 % external shampoo APPLY TO AFFECTED AREA(S) THREE TIMES WEEKLY 120 mL 11    loratadine (CLARITIN) 10 MG tablet Take 10 mg by mouth daily      Multiple Vitamins-Minerals (MULTIPLE VITAMINS/WOMENS PO) Take 1 tablet by mouth daily.      rizatriptan (MAXALT-MLT) 5 MG ODT Take 1-2 tablets (5-10 mg) by mouth at onset of headache for migraine May repeat dose in 2 hours.  Do not exceed 30 mg in 24 hours 10 tablet 1    spironolactone (ALDACTONE) 50 MG tablet Take 2 tablets (100 mg) by mouth daily. 180 tablet 1    TRINTELLIX 20 MG tablet Take 20 mg by mouth daily.       No current facility-administered medications for this visit.      Patient Active Problem List   Diagnosis    Depression    OCD (obsessive compulsive disorder)    IBS (irritable bowel syndrome)    Migraines    Hyperlipidemia    Contraception -- abstinence    Plantar fascial fibromatosis    Acne    Hair loss    Anxiety associated with depression    ALIVIA (obstructive sleep apnea)    Essential hypertension    Factor 5 Leiden mutation, heterozygous    Chronic fatigue    Excessive sleepiness    Class 1 obesity due to excess calories with serious comorbidity  and body mass index (BMI) of 31.0 to 31.9 in adult      Past Medical History:   Diagnosis Date    Abnormal Pap smear 2006?    dysplasia  X 1; paps OK since then...    Arm DVT (deep venous thromboembolism), acute (H) 2008    hx with phlebitis in IVs after a surgery    Breast disorder 2016    2D mammo, then 3D & u/s to r/o = scar tissue from reduction    Cholesterol serum elevated     runs in family    Complication of anesthesia 2000?, 2016    very slow to awake from both gen. anesth. & conscious sedati    Depression     Depressive disorder 1996    under care of a psychiatrist    Encounter for insertion of Mirena IUD 2011    D/C'd w increased acne    Hypertension 2017&2018    jamin. during exercise, caused by stimulant for depression?    IBS (irritable bowel syndrome) 2002    under control, better with probiotic    Menarche age 15    cycles q 1-2 months Xs 3-4 d w bad cramps    Migraines 2008    a lot better now, may be stress related    OCD (obsessive compulsive disorder)     Seasonal allergies      Past Surgical History:   Procedure Laterality Date    BIOPSY  2000    dermatology-moles    COLONOSCOPY  2024    No issues    COLONOSCOPY WITH CO2 INSUFFLATION N/A 12/08/2023    Procedure: Colonoscopy with CO2 insufflation;  Surgeon: Chante Shukla MD;  Location: [facility] OR    COLPOSCOPY CERVIX, BIOPSY CERVIX, ENDOCERVICAL CURETTAGE, COMBINED      HEAD & NECK SURGERY  1990?, 2016    wisdom teeth extracted, gum tissue grafting/oral surgery    LASIK Bilateral 2008    MAMMOPLASTY REDUCTION BILATERAL  11/1997     Family History   Problem Relation Age of Onset    Cancer Father 57        bladder ca    Genitourinary Problems Father         Polycystic kidney    Other Cancer Father         bladder cancer    Genetic Disorder Father         polycystic kidneys    Cancer - colorectal Paternal Grandmother 75    Colon Cancer Paternal Grandmother     Mental Illness Other         SeeBelow    Cancer Mother         skin cancer    Hyperlipidemia  Mother         runs in Mom's family-several family members    Other Cancer Mother         skin cancer    Asthma Mother         runs in Mom's family    Skin Cancer Mother     Obesity Mother     Alcohol/Drug Paternal Aunt         Xs 2    Thyroid Disease Maternal Grandmother         Hypothyroid disease    Obesity Maternal Grandmother     Coronary Artery Disease Maternal Grandfather         heart attack    Mental Illness Other         Schizophrenia    Mental Illness Cousin         OCD    Depression Other         Chronic MajorDepression    Mental Illness Other         Cousin commited suicide in 2020, same age as me    Anxiety Disorder Other         gen. anxiety disorder    Mental Illness Other         OCD    Substance Abuse Other         alcoholism-runs in Dad's family    Genetic Disorder Other         polycystic kidneys    Genetic Disorder Brother         [id number]    Asthma Brother     Deep Vein Thrombosis (DVT) Brother     Genetic Disorder Other         Factor Five Leiden    Genetic Disorder Other         Factor Five Leiden    Hyperlipidemia Other         High choleterol, specifically triglycerides    Hyperlipidemia Other         Hgh cholestrol    C.A.D. No family hx of     Hypertension No family hx of     Breast Cancer No family hx of      Social History     Tobacco Use    Smoking status: Never    Smokeless tobacco: Never   Substance Use Topics    Alcohol use: Not Currently     Comment: Social drinker, rare occassions              The plan of care was discussed with the patient. They understand and agree with the course of treatment prescribed. A printed summary was given including instructions and medications.  The use of Dragon/[employer] dictation services may have been used to construct the content in this note; any grammatical or spelling errors are non-intentional. Please contact the author of this note directly if you are in need of any clarification.

## 2025-04-16 NOTE — PATIENT INSTRUCTIONS
The vast majority of sinus infections are caused by viruses and improve after 12-14 days. This means antibiotics will not help your symptoms and possible give you undesirable side effects like diarrhea and upset stomach.    First, improve your sinus hygiene by:  Flonase/fluticasone nasal spray 2 sprays in each nostril once a day for 1 - 4 weeks.  This may take several days to become effective.   Consider saline nasal rinses  Ibuprofen 400 mg to 600 mg 4 times a day as needed for pain relief and anti-inflammatory  Mucinex may be helpful  Vicks VapoRub  Humidified air and steam  Pseudoephedrine can help if you tolerate it but do not take if you have high blood pressure.  You will need to ask the pharmacist for this medication.  These can be bought Over the Counter at any Pharmacy.    If your symptoms are not improving or worsening after 12-14 days since symptom onset and 5-7 days of trying the above then you can get and try the Antibiotic prescribed because the sinus infection is likely to be bacterial at that point.

## 2025-04-16 NOTE — PROGRESS NOTES
Urgent Care Clinic Visit    Chief Complaint   Patient presents with    Urgent Care    URI     Per patient states this is day 10 of symptoms sinus pressure/pain, post nasal drip, cough- productive, fatigued, states it started with a sore throat but that has completely gone away, patient does work in a hospital.                4/16/2025     3:38 PM   Additional Questions   Roomed by EDILMA Vanessa   Accompanied by Self         4/16/2025   Forms   Any forms needing to be completed Yes

## 2025-05-05 ENCOUNTER — VIRTUAL VISIT (OUTPATIENT)
Dept: PHARMACY | Facility: CLINIC | Age: 50
End: 2025-05-05
Attending: INTERNAL MEDICINE
Payer: COMMERCIAL

## 2025-05-05 VITALS — BODY MASS INDEX: 25.27 KG/M2 | HEIGHT: 64 IN | WEIGHT: 148 LBS

## 2025-05-05 DIAGNOSIS — E66.3 OVERWEIGHT (BMI 25.0-29.9): Primary | ICD-10-CM

## 2025-05-05 ASSESSMENT — PAIN SCALES - GENERAL: PAINLEVEL_OUTOF10: NO PAIN (0)

## 2025-05-05 NOTE — Clinical Note
Mireille is doing so so well! She is close to a goal weight so will try extending the dosing interval once she is back on Wegovy pens (can't use compounded semaglutide beyond 28 days once vial punctured).  I will follow up with her in July. She is seeing you again in Oct.  Michelle

## 2025-05-05 NOTE — NURSING NOTE
Current patient location: [address] [place] [id number]    Is the patient currently in the state of [place]? YES    Visit mode: TELEPHONE    If the visit is dropped, the patient can be reconnected by:TELEPHONE VISIT: Phone number:   Telephone Information:   Mobile [phone]       Will anyone else be joining the visit? NO  (If patient encounters technical issues they should call [phone]    Are changes needed to the allergy or medication list? No    Are refills needed on medications prescribed by this physician? NO    Rooming Documentation:  Questionnaire(s) not pre-assigned    Reason for visit: Medication Therapy Management    Dianne HAND

## 2025-05-05 NOTE — PROGRESS NOTES
Medication Therapy Management (MTM) Encounter    ASSESSMENT:                            Medication Adherence/Access: education provided today of [facility] insurance requirements changing - stopping GLP1/GIP Agonist coverage for weight management in 2025.   Discussed options for continuing GLP1/GIP agonist in 2025. Wegovy savings card is affordable for her at this time.    Education provided not safe to use compounded semaglutide beyond 28 days due to infection risk - no preservative..    Weight management:   Patient would benefit from continuing pharmacotherapy for weight management. Given tolerating well, close to goal weight - recommend continuing GLP1/GIP therapy as data to support and patient has no contraindications. Patient also realizing benefit from reduction in food noise and increased satiety. Education provided on continued dietary and behavioral modifications to help with safety and efficacy. Recommend to continue current dose, may consider stretching dosing interval for cost and help with weight maintenance.    PLAN:                            Use up current supply compounded semaglutide 0.5 mg weekly - as discussed, after 28 days, this must be discarded due to infection risk.    After you have used up your compounded semaglutide vial - you may transition back to Wegovy auto-injector using Savings Card (link below).    As discussed, with Wegovy coupon Wegovy is $499 for 4 doses: (https://www.wegovy.com/coverage-and-savings)    You are doing so great and close to a goal weight for you! Awesome work! To help reduce cost and help maintain weight loss, you can try extending your dosing interval when you get Wegovy 0.5 auto-injector pens. When kept in the fridge, the pens from the pharmacy are safe to use until the expiration date on the box. Given this, you may try the following to help cost go further:    Wegovy 0.5 mg subcutaneous injection every 10-14 days to help maintain appetite and weight management.  If you find appetite is difficult to manage or you have side effects waiting too long between doses, you may reduce back to every 7 day dosing.      Follow-up:   Appointments in Next Year      Jul 22, 2025 11:30 AM  Pharmacist Visit with Michelle Miner RPH  [facility] ([facility] ) [phone]     Oct 03, 2025 11:40 AM  (Arrive by 11:25 AM)  Return Weight Management Visit with Christopher Plata MD  [facility] [place] ([facility] ) [phone]            SUBJECTIVE/OBJECTIVE:                          Mireille Walls is a 50 year old female seen for a follow-up visit.       Reason for visit: Medication Therapy Management - weight management GLP1/GIP Agonist Management     Allergies/ADRs: Reviewed in chart  Past Medical History: Reviewed in chart  Tobacco: She reports that she has never smoked. She has never used smokeless tobacco.  Alcohol: Social drinker, rare occassions     Medication Adherence/Access: Medication barriers: obtaining medication from insurance.     Has been using compounded semaglutide past 28 days to optimize cost.    Weight Management   Compounded semaglutide  0.5 mg weekly 7 days    Bupropion  mg once daily (for mental health)     return Medical Weight Managment Visit with Christopher Pittman 1/24/25; next  visit 10/3/25  Registered dietician visit with Juliana Crane Registered Dietician 7/16/24    Notes that 2x in the past few months - will happen when having sweets, specifically lactose, sweet, or rich foods. Has been very pleased with results - nutrition improved. Happy that uniform is smaller and more energy. Notes that with semaglutide, significantly less sweating with activity -- this has been a barrier to movement and overall more comfort with less sweating.    Patient reports working overnight - gets hungry on the way home and will go through  "drive thru - she has not had this craving at all since starting Wegovy.    Nutrition/Eating Habits: working to limit carbohydrates (notes limits fried foods given less cravings), focus on protein and eating more regularly with more protein and smaller portions.    Drinks 3 glassed of milk per day to help meet nutritional goals of increasing protein.    Works nights so balances routine with working and when waking.    Water:   40-60 oz per day - this is difficult for patient, but working on this     Exercise/Activity: walks a lot at work - many days >10,000K steps. Likes that not sweaty as much and this encourages more activity for her.    Medications Tried/Failed:  GLP1/GIP agonist : Patient denies personal or family history of MEN Type2, MTC, Pancreatitis.   Abstinent - no current pregnancy risk; patient acknowledges understanding of this risk.      History    Weight gain Especially after menopause and depression. 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 Depression also contributes to overeating/relationship with food and fatigue. Hopeful for some improved relationship with food and more energy to help feel better with clothing and overall    Initial Consult Weight: 182 lb         Current weight today: 148 lbs 0 oz  Cumulative Weight Loss: -34 lb, -18.7% from baseline    Wt Readings from Last 4 Encounters:   05/05/25 148 lb (67.1 kg)   04/16/25 152 lb (68.9 kg)   03/18/25 156 lb (70.8 kg)   01/24/25 162 lb (73.5 kg)     Estimated body mass index is 25.4 kg/m  as calculated from the following:    Height as of this encounter: 5' 4\" (1.626 m).    Weight as of this encounter: 148 lb (67.1 kg).        Today's Vitals: Ht 5' 4\" (1.626 m)   Wt 148 lb (67.1 kg)   LMP 04/14/2025   BMI 25.40 kg/m    ----------------      I spent 19 minutes with this patient today. All changes were made via collaborative practice agreement with Christopher Plata.     A summary of these recommendations was sent via [employer].    Michelle Miner, Pharm D., " MPH    Medication Therapy Management Pharmacist   [facility]     Telemedicine Visit Details  The patient's medications can be safely assessed via a telemedicine encounter.  Type of service:  Telephone visit  Originating Location (pt. Location): Home    Distant Location (provider location):  Off-site  Start Time: 1:00 PM  End Time: 1:19 PM     Medication Therapy Recommendations  Class 1 obesity due to excess calories with serious comorbidity and body mass index (BMI) of 31.0 to 31.9 in adult   1 Current Medication: COMPOUNDED NON-CONTROLLED SUBSTANCE (CMPD RX) - PHARMACY TO MIX COMPOUNDED MEDICATION (Discontinued)   Current Medication Sig: Inject 0.5 mg semaglutide subcutaneously weekly   Rationale: Medication product not available - Adherence - Adherence   Recommendation: Change Medication   Status: Accepted per CPA   Identified Date: 5/5/2025 Completed Date: 5/5/2025

## 2025-05-05 NOTE — PATIENT INSTRUCTIONS
Recommendations from MTM Pharmacist visit:                                                    MTM (medication therapy management) is a service provided by a clinical pharmacist designed to help you get the most of out of your medicines.  You may be sent a phone or email survey evaluating today's visit.  Please provide feedback you have for the service he received today if you are able.          Use up current supply compounded semaglutide 0.5 mg weekly - as discussed, after 28 days, this must be discarded due to infection risk.    After you have used up your compounded semaglutide vial - you may transition back to Wegovy auto-injector using Savings Card (link below).    As discussed, with Wegovy coupon Wegovy is $499 for 4 doses: (https://www.wegovy.com/coverage-and-savings)    You are doing so great and close to a goal weight for you! Awesome work! To help reduce cost and help maintain weight loss, you can try extending your dosing interval when you get Wegovy 0.5 auto-injector pens. When kept in the fridge, the pens from the pharmacy are safe to use until the expiration date on the box. Given this, you may try the following to help cost go further:    Wegovy 0.5 mg subcutaneous injection every 10-14 days to help maintain appetite and weight management. If you find appetite is difficult to manage or you have side effects waiting too long between doses, you may reduce back to every 7 day dosing.      Follow-up:   Appointments in Next Year      Jul 22, 2025 11:30 AM  Pharmacist Visit with Michelle Miner RPH  [facility] ([facility] ) [phone]     Oct 03, 2025 11:40 AM  (Arrive by 11:25 AM)  Return Weight Management Visit with Christopher Plata MD  [facility] [place] ([facility] ) [phone]            It was great speaking with you today.  I value your experience and would be  "very thankful for your time in providing feedback in our clinic survey. In the next few days, you may receive an email or text message from [facility] [employer] with a link to a survey related to your  clinical pharmacist.\"     To schedule another MT appointment, please call the clinic directly ([facility] Phone Number: [phone] (schedules for [facility] and [facility] - providers, dietitians, health coaches) or you may call the MTM scheduling line at [phone] or toll-free at [phone].     My Clinical Pharmacist's contact information:                                                      Please feel free to contact me with any questions or concerns you have.      Michelle Miner, Pharm D., MPH    Medication Therapy Management Pharmacist   [facility]      Try doing the \"Healthy Eating Plate\" method at home by following these rules: Start with a 9-inch dinner plate.         Meal Replacement Shake Options:   *Protein Shake Criteria: no more than 210 Calories, at least 20 grams of protein, and less than 10 grams of sugar   Premier Protein (160 Calories, 30 g protein)  Slim Fast Advanced Nutrition (180 Calories, 20 g protein)  Muscle Milk, lactose-free, 17 oz bottle (210 Calories, 30 g protein)  Integrated Supplements, no artificial sugars (110 Calories, 20 g protein)  Boost/Ensure Max (160 calories, 30 gm protein)   Fairlife Protein Shakes (160-230 calories, 26-42 gm protein)  Aldi's Elevation Protein Powder (180 calories, 30 gm protein)   Orgain Protein Shakes (130-160 calories, 20-26 gm protein)     Meal Replacement Bar Options:  Quest Protein Bars (190 Calories, 20 g protein)  Built Bar (170 Calories, 15-20 g protein)  One Protein Bar (210 calories, 20 g protein)  [place] Signature Protein Bar ([employer]) (190 Calories, 21 g protein)  Pure Protein Bars (180 Calories, 21 g protein)    Low Calorie Frozen Meal:  Healthy Choice Power " Bowls  Lean Cuisine  Smart Ones  Paulo Dominguez      ---------------------------------------------------------------  Tips to Increase the Protein in Your Diet  You may need more protein in your diet to help you heal from an illness, surgery or wound. Extra protein can also help you gain weight. Here are some ideas for adding high-protein foods to your meals.  Meat and fish  Add chopped cooked meat to vegetables, salads, casseroles, soups, sauces and biscuit dough.  Use in omelets, soufflés, quiches, sandwich fillings and chicken or turkey stuffing.  Wrap in pie crust or biscuit dough to make a turnover.  Add to stuffed baked potatoes.  Make a dip with diced meat or flaked fish mixed with sour cream and spices.  Chopped, hard-cooked eggs  Add to salads.  Use for snacks and sandwich filling.  High-protein milk  To make high-protein milk, mix 1 quart whole milk with 1 cup powdered milk.  Add to cream soups, mashed potatoes, scrambled eggs, cereals and dried eggnog mix.  Use as an ingredient in puddings, custards, hot chocolate, milk shakes and pancakes.  Powdered milk  If you don't have any high-protein milk on hand, you can use powdered milk. Add 3 tablespoons to:  gravies, sauces, cream soups, mayonnaise  casseroles, meat patties, meatloaf, tuna salad, deviled ham  scalloped or mashed potatoes, creamed spinach  scrambled eggs, egg salad  cereals  yogurt, milk drinks, ice cream, frozen desserts, puddings, custards.  Add 4 to 6 tablespoons powdered milk to make:  cream sauces  breads, muffins, pancakes, waffles, cookies, cakes  cream pies, frostings, cake fillings  fruit cobblers, bread or rice pudding, gelatin desserts.  For high-protein eggnog, add 3 to 6 tablespoons powdered milk to prepared eggnog.  Hard or soft cheese  Melt on sandwiches, breads, tortillas, hamburgers, hot dogs, other meats, vegetables, eggs and pies.  Grate into soups, chili, sauces, casseroles, vegetables, potatoes, rice, noodles or  meatloaf.  Eat with toast or crackers, or melt for adelso dip.  Cottage cheese or ricotta cheese  Mix with or scoop on top of fruits and vegetables.  Add to casseroles, lasagna, spaghetti, noodles and egg dishes (omelets, scrambled eggs, soufflés).  Use in gelatin, pudding-type desserts, cheesecake and pancake batter.  Use to stuff crepes, pasta shells or manicotti.  Fruit yogurt  Blend with fruits for a fruit smoothie.  Use as a dip for fruits and vegetables.  Scoop on top of pancakes or waffles.  Tofu  Blend silken tofu with fruits and juices for a smoothie.  Add chunks of firm tofu to soups and stews, or crumble into meatloaf.  Blend dried onion soup mix into soft or silken tofu for dip.  Use pureed silken tofu for part of the mayonnaise, sour cream, cream cheese or ricotta cheese called for in recipes.  Beans  Use cooked beans or peas in soups, casseroles, pasta, tacos and burritos.  Nuts and seeds  Note: For children under 3, discuss with the child's care team.  Use in casseroles, breads, muffins, pancakes, cookies and waffles.  Sprinkle on fruits, cereals, ice cream, yogurt, vegetables and salads.  Mix with raisins, dried fruits and chocolate chips for a snack.  Nut butters  Note: For children under 3, discuss with the child's care team.  Spread on sandwiches, toast, muffins, crackers, waffles, pancakes and fruit slices.  Use as a dip for raw vegetables.  Blend with milk drinks, or swirl through ice cream, yogurt or hot cereal.  Nutrition supplements (nutrition bars, drinks and powders)  Add powders to milk drinks and desserts.  Mix with ice cream, milk and fruit for a high-protein milk shake.    For informational purposes only. Not to replace the advice of your health care provider. Clinically reviewed by Lisa Martinez, ÁLVARO, SOLOMON, and the Clinical Nutrition Service Line. Copyright   2005 [employer]. All rights reserved. [employer] [id number] - REV  "04/24.      -----------------------------------------------------------------------------------------------------------------  Learning About High-Protein Foods  What foods are high in protein?     The foods you eat contain nutrients, such as vitamins and minerals. Protein is a nutrient. Your body needs the right amount to stay healthy and work as it should. You can use the list below to help you make choices about which foods to eat.  Here are some examples of foods that are high in protein.  Dairy and dairy alternatives  Cheese  Milk  Soy milk  Yogurt (especially Greek)  Meat  Beef  Chicken  Ham  Lamb  Lunch meat  Pork  Sausage  Turkey  Other protein foods  Hemp seeds!  Beans (black, garbanzo, kidney, lima)  Eggs  Hummus  Lentils  Nuts  Peanut butter and other nut butters  Peas  Soybeans  Tofu  Veggie or soy robson (Check the nutrition label for the amount of protein in each serving.)  Seafood  Anchovies  Cod  Crab  Halibut  Muddy  Sardines  Shrimp  Tilapia  [place]  Tuna  Protein supplements  Bars (Check the nutrition label for the amount of protein in each serving.)  Drinks  Powders  Work with your doctor to find out how much of this nutrient you need. Depending on your health, you may need more or less of it in your diet.  Where can you learn more?  Go to https://www.[employer].net/patiented  Enter P335 in the search box to learn more about \"Learning About High-Protein Foods.\"  Current as of: September 20, 2023               Content Version: 14.0    1280-4647 [employer].   Care instructions adapted under license by your healthcare professional. If you have questions about a medical condition or this instruction, always ask your healthcare professional. [employer] disclaims any warranty or liability for your use of this information.         "

## 2025-07-04 ENCOUNTER — RESULTS FOLLOW-UP (OUTPATIENT)
Dept: URGENT CARE | Facility: URGENT CARE | Age: 50
End: 2025-07-04

## 2025-07-22 ENCOUNTER — VIRTUAL VISIT (OUTPATIENT)
Dept: PHARMACY | Facility: CLINIC | Age: 50
End: 2025-07-22
Attending: INTERNAL MEDICINE
Payer: COMMERCIAL

## 2025-07-22 VITALS — HEIGHT: 64 IN | WEIGHT: 142 LBS | BODY MASS INDEX: 24.24 KG/M2

## 2025-07-22 DIAGNOSIS — E66.3 OVERWEIGHT (BMI 25.0-29.9): ICD-10-CM

## 2025-07-22 ASSESSMENT — PAIN SCALES - GENERAL: PAINLEVEL_OUTOF10: NO PAIN (0)

## 2025-07-22 NOTE — PROGRESS NOTES
Medication Therapy Management (MTM) Encounter    ASSESSMENT:                            Medication Adherence/Access: No issues identified. Ok to continue out of pocket for Wegovy at this time. Discussed extending dosing interval of Wegovy to 10 days to help with cost, but patient declined for now as newly at goal weight and managing appetite and weight well at this time. Continue to utilize savings card - if becomes barrier, will discuss alternatives with Medication Therapy Management.     Weight management :   Recommend to continue current dose Wegovy and meet with registered dietician to discuss nutrition goals for maintenance. Education provided on risk of muscle wasting and benefit of strength training to maintain and build muscle for healthy bones and weight management.    PLAN:                            Continue Wegovy 0.5mg every 7 days. If you feel that weight loss continues too low or cost becomes more of a barrier, given you are at a healthy weight for you that feels strong, you may consider extending your dosing interval to 10 days.    If cost becomes significant barrier - please reach out to Michelle Miner PharmD for a visit to discuss this.  Work on adding strength training (see below) to help build and maintain muscle for overall health- especially bone health.      Given you are now moving towards a weight maintenance phase, I recommend meeting with an registered dietician with [facility] again to help find nutritional goals that will work for you.    [facility] Phone Number: [phone] (schedules for [facility] and [place] clinics - providers, dietitians, health coaches)      Follow-up: with Michelle Miner, PharmD as decided with Christopher Pittman in Oct.    Appointments in Next Year      Oct 03, 2025 11:40 AM  (Arrive by 11:25 AM)  Return Weight Management Visit with Christopher Plata MD  [facility]  Management Clinic [facility] [place] ) [phone]            SUBJECTIVE/OBJECTIVE:                          Mireille Walls is a 50 year old female seen for a follow-up visit.       Reason for visit: Medication Therapy Management - weight management .    Allergies/ADRs: Reviewed in chart  Past Medical History: Reviewed in chart  Tobacco: She reports that she has never smoked. She has never used smokeless tobacco.  Alcohol: Social drinker, rare occassions     Medication Adherence/Access: Medication barriers: obtaining medication from insurance. Using savings card and this is affordable. Happy that for last fill, savings card brought down to $299, but able to afford if needed at $499 as feels benefit>risk/cost.    Weight Management   Wegovy  0.5 mg weekly 7 days    Bupropion  mg once daily (for mental health)     return Medical Weight Managment Visit with Christopher Pittman 1/24/25; next visit 10/3/25  Registered dietician visit with Juliana Crane Registered Dietician 7/16/24    Very pleased with Wegovy and feeling that current weight and physically strong and mentally well since losing weight. Working toward maintenance - can afford out of pocket for now - but considering when may taper off in the future. Some returning appetite listening to that and still maintaining weight. Feels this is a good weight and good dose for her to maintain this.    Patient reports working overnight - gets hungry on the way home and will go through drive thru - she has not had this craving at all since starting Wegovy.    Nutrition/Eating Habits: working to limit carbohydrates (notes limits fried foods given less cravings), focus on protein and eating more regularly with more protein and smaller portions.    Drinks 3 glassed of milk per day to help meet nutritional goals of increasing protein.    Works nights so balances routine with working and when waking.    Nutrition Goals from  "registered dietician visit 7/16/24  1) Aim to increase water intake to 40 oz/day  2) Look through resources below, can make more specific goals at next appointment.      Water:   40-60 oz per day - this is difficult for patient, but working on this     Exercise/Activity: walks a lot at work - many days >10,000K steps.      Medications Tried/Failed:  GLP1/GIP agonist : Patient denies personal or family history of MEN Type2, MTC, Pancreatitis.   Abstinent - no current pregnancy risk; patient acknowledges understanding of this risk.      History    Weight gain Especially after menopause and depression. 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 Depression also contributes to overeating/relationship with food and fatigue. Hopeful for some improved relationship with food and more energy to help feel better with clothing and overall    Initial Consult Weight: 182 lb       Current weight today: 142 lbs 0 oz  Cumulative Weight Loss: -40 lb, -22% from baseline    Wt Readings from Last 4 Encounters:   07/22/25 142 lb (64.4 kg)   07/04/25 141 lb (64 kg)   05/05/25 148 lb (67.1 kg)   04/16/25 152 lb (68.9 kg)     Estimated body mass index is 24.37 kg/m  as calculated from the following:    Height as of this encounter: 5' 4\" (1.626 m).    Weight as of this encounter: 142 lb (64.4 kg).        Today's Vitals: Ht 5' 4\" (1.626 m)   Wt 142 lb (64.4 kg)   BMI 24.37 kg/m    ----------------      I spent 23 minutes with this patient today. All changes were made via collaborative practice agreement with Christopher Plata.     A summary of these recommendations was sent via [employer].    Michelle Miner, Pharm D., MPH    Medication Therapy Management Pharmacist   [facility]     Telemedicine Visit Details  The patient's medications can be safely assessed via a telemedicine encounter.  Type of service:  Telephone visit  Originating Location (pt. Location): Home    Distant Location (provider location):  Off-site  Start " Time: 11:31 AM  End Time: 11:54 AM     Medication Therapy Recommendations  No medication therapy recommendations to display

## 2025-07-22 NOTE — PATIENT INSTRUCTIONS
"Recommendations from MTM Pharmacist visit:                                                    MTM (medication therapy management) is a service provided by a clinical pharmacist designed to help you get the most of out of your medicines.  You may be sent a phone or email survey evaluating today's visit.  Please provide feedback you have for the service he received today if you are able.          Continue Wegovy 0.5mg every 7 days. If you feel that weight loss continues too low or cost becomes more of a barrier, given you are at a healthy weight for you that feels strong, you may consider extending your dosing interval to 10 days.    If cost becomes significant barrier - please reach out to Michelle Miner, JazzyD for a visit to discuss this.  Work on adding strength training (see below) to help build and maintain muscle for overall health- especially bone health.      Given you are now moving towards a weight maintenance phase, I recommend meeting with an registered dietician with [facility] again to help find nutritional goals that will work for you.    [facility] Phone Number: [phone] (schedules for [facility] and [facility] - providers, dietitians, health coaches)      Follow-up: with Michelle Miner, PharmD as decided with Christopher Pittman in Oct.    Appointments in Next Year      Oct 03, 2025 11:40 AM  (Arrive by 11:25 AM)  Return Weight Management Visit with Christopher Plata MD  [facility] [place] ([facility] ) [phone]       It was great speaking with you today.  I value your experience and would be very thankful for your time in providing feedback in our clinic survey. In the next few days, you may receive an email or text message from [facility] [employer] with a link to a survey related to your  clinical pharmacist.\"     To schedule another MTM appointment, " please call the clinic directly ([facility] Phone Number: [phone] (schedules for [facility] and [facility] - providers, dietitians, health coaches) or you may call the [facility] scheduling line at [phone] or toll-free at [phone].     My Clinical Pharmacist's contact information:                                                      Please feel free to contact me with any questions or concerns you have.      Michelle Miner, Pharm D., MPH    Medication Therapy Management Pharmacist   [facility]    Muscle Conditioning: Exercises  Introduction  Here are some examples of exercises for muscle conditioning. Start each exercise slowly. Ease off the exercise if you start to have pain.  Your doctor or physical therapist will tell you when you can start these exercises and which ones will work best for you.  How to do the exercises  Wall push-up    Stand facing a wall with your feet about 12 to 24 inches from the wall. If you feel any pain when you do this exercise, stand closer to the wall.  Place your hands on the wall at shoulder height, slightly wider apart than your shoulders. Turn your fingers out a little, rather than straight up and down.  Slowly bend your elbows and bring your face toward the wall, keeping your shoulders and hips lined up. Then slowly push back to the starting position. Keep the motion smooth and controlled.  Repeat 8 to 12 times.  When you can do this exercise against a wall with ease and no pain, you can try it against a counter. You can then slowly progress to the end of a couch, then to a sturdy chair, and finally to the floor.  Quadriceps (thigh) strengthening    While sitting in a chair, straighten one leg and hold for 6 seconds. Do not lock your knee. Then slowly lower your leg.  Repeat 8 to 12 times with each leg.  When this exercise becomes easy, you can add a light weight to your ankle.  Hip  abduction (lying on side)    Lie on your side, with your affected leg on top. You can use your hand or a pillow to support your head.  Keep your knee straight and your leg in a straight line with your body.  Lift your affected leg straight up toward the ceiling, about 12 inches off the floor. Hold for about 6 seconds, then slowly lower your leg.  Repeat 8 to 12 times.  It's a good idea to repeat these steps on your other side.  Keep your kneecap pointing forward.  Don't let your hip drop back.  Shallow standing knee bend    Stand with your hands lightly resting on a counter or chair in front of you. Put your feet shoulder-width apart.  Slowly bend your knees so that you squat down like you're going to sit in a chair. Make sure that your knees don't go in front of your toes.  Lower yourself about 6 inches. Your heels should stay on the floor at all times.  Rise slowly to a standing position.  Repeat 8 to 12 times.  Follow-up care is a key part of your treatment and safety. Be sure to make and go to all appointments, and call your doctor if you are having problems. It's also a good idea to know your test results and keep a list of the medicines you take.  Current as of: July 31, 2024  Content Version: 14.4    3987-6807 [employer].   Care instructions adapted under license by your healthcare professional. If you have questions about a medical condition or this instruction, always ask your healthcare professional. [employer] disclaims any warranty or liability for your use of this information.    Resistance Training With Exercise Bands: Exercises  Introduction  Here are some examples of exercises for resistance training. Start each exercise slowly. Ease off the exercise if you start to have pain.  Your doctor or physical therapist will tell you when you can start these exercises and which ones will work best for you.  How to do the exercises  Side pull    Sit or stand up straight. Grasp an  exercise band with your hands about shoulder-width apart.  Raise both arms overhead, palms of your hands facing forward.  Slowly pull one arm down and to the side, bending your elbow and stretching the band until your elbow is at shoulder height. Hold for 1 to 2 seconds.  Slowly return to the starting position with your arms straight up.  Repeat with the other arm.  Repeat 8 to 12 times with each arm.  Overhead pull    Sit or stand up straight. Grasp an exercise band with your hands about shoulder-width apart.  Raise both arms overhead, palms of your hands facing forward.  Slowly pull your hands apart, stretching the band. Hold for 1 to 2 seconds.  Slowly return to the starting position with your arms straight up.  Repeat 8 to 12 times.  Up-down pull    Sit or stand up straight. Grasp an exercise band with your hands about shoulder width apart.  Raise both arms overhead.  Bend your elbows until they are at shoulder height, with the stretched band either behind or in front of your head. Hold for 1 to 2 seconds.  Slowly return to the starting position with your arms straight up.  Repeat 8 to 12 times.  Chest-level pull    Sit or stand up straight. Grasp an exercise band with your hands about shoulder-width apart.  Raise your arms to chest level and bend your elbows.  Slowly pull your hands apart and your shoulder blades together, stretching the band. Hold for 1 to 2 seconds. Try to keep your hands up at your chest level, and do not pull your shoulders up toward your ears.  Slowly return to your starting position.  Repeat 8 to 12 times.  Hip-level pull    Stand or sit up straight in a chair without arms. Grasp an exercise band with your hands about shoulder-width apart.  Hold your hands at the level of your hips, or near your lap if you are sitting down.  Slowly pull your hands apart, stretching the band. Hold for 1 or 2 seconds.  Slowly return to your starting position.  Repeat 8 to 12 times.  Follow-up care is a key  part of your treatment and safety. Be sure to make and go to all appointments, and call your doctor if you are having problems. It's also a good idea to know your test results and keep a list of the medicines you take.  Current as of: July 31, 2024  Content Version: 14.4    8164-5666 [employer].   Care instructions adapted under license by your healthcare professional. If you have questions about a medical condition or this instruction, always ask your healthcare professional. [employer] disclaims any warranty or liability for your use of this information.  Resistance Training With Free Weights: Exercises  Introduction  Here are some examples of exercises for resistance training. Start each exercise slowly. Ease off the exercise if you start to have pain.  Your doctor or physical therapist will tell you when you can start these exercises and which ones will work best for you.  How to do the exercises  Chest fly (lying down)    Lie on a bench or exercise ball, and hold light weights straight up over your chest. You can also use soup cans or filled water bottles for weights. Do not lock your elbows. You can keep them slightly bent if that is more comfortable.  Slowly lower your arms, keeping them extended, until the weights are level with your chest or slightly lower.  Slowly raise your arms until you are in the starting position.  Repeat 8 to 12 times.  Arm raise to the side (elbows bent)    Stand with your feet shoulder-width apart and your knees slightly bent. Or sit up straight in a chair.  Hold a 1- to 2-pound weight in each hand. The weight may be a dumbbell, a can of food, or a filled water bottle.  Bend your elbows 90 degrees while keeping them at your sides. With your palms facing in, hold the weights straight in front of you.  Slowly lift the weights and your elbows out to the sides to shoulder level, keeping your elbows bent. Keep your shoulders down and relaxed as you lift. If you  "find that you are shrugging your shoulders up toward your ears, your weights may be too heavy. Try using lighter weights (or even no weights).  Slowly lower the weights and your elbows until your elbows are back at your sides.  Repeat 8 to 12 times.  Biceps curl    Sit leaning forward with your legs slightly spread apart and your left hand on your left thigh.  Hold a 1- to 2-pound weight in your right hand. The weight may be a dumbbell, a can of food, or a filled water bottle.  Place your right elbow on your right thigh, keeping your elbow slightly bent.  Slowly lift (curl) the weight up and toward your chest.  Slowly return it to the starting point.  Repeat 8 to 12 times.  Repeat these steps with your other arm.  Follow-up care is a key part of your treatment and safety. Be sure to make and go to all appointments, and call your doctor if you are having problems. It's also a good idea to know your test results and keep a list of the medicines you take.  Current as of: July 31, 2024  Content Version: 14.4    [id number] [employer].   Care instructions adapted under license by your healthcare professional. If you have questions about a medical condition or this instruction, always ask your healthcare professional. [employer] disclaims any warranty or liability for your use of this information.      Try doing the \"Healthy Eating Plate\" method at home by following these rules: Start with a 9-inch dinner plate.         Meal Replacement Shake Options:   *Protein Shake Criteria: no more than 210 Calories, at least 20 grams of protein, and less than 10 grams of sugar   Premier Protein (160 Calories, 30 g protein)  Slim Fast Advanced Nutrition (180 Calories, 20 g protein)  Muscle Milk, lactose-free, 17 oz bottle (210 Calories, 30 g protein)  Integrated Supplements, no artificial sugars (110 Calories, 20 g protein)  Boost/Ensure Max (160 calories, 30 gm protein)   [facility] Protein Shakes (160-230 " calories, 26-42 gm protein)  [facility] Protein Powder (180 calories, 30 gm protein)   Orgain Protein Shakes (130-160 calories, 20-26 gm protein)     Meal Replacement Bar Options:  Quest Protein Bars (190 Calories, 20 g protein)  Built Bar (170 Calories, 15-20 g protein)  One Protein Bar (210 calories, 20 g protein)  Ramirez Signature Protein Bar ([employer]) (190 Calories, 21 g protein)  Pure Protein Bars (180 Calories, 21 g protein)    Low Calorie Frozen Meal:  Healthy Choice Power Bowls  Lean Cuisine  Smart Ones  Pauloalexa Gamezgary Dominguez      ---------------------------------------------------------------  Tips to Increase the Protein in Your Diet  You may need more protein in your diet to help you heal from an illness, surgery or wound. Extra protein can also help you gain weight. Here are some ideas for adding high-protein foods to your meals.  Meat and fish  Add chopped cooked meat to vegetables, salads, casseroles, soups, sauces and biscuit dough.  Use in omelets, soufflés, quiches, sandwich fillings and chicken or turkey stuffing.  Wrap in pie crust or biscuit dough to make a turnover.  Add to stuffed baked potatoes.  Make a dip with diced meat or flaked fish mixed with sour cream and spices.  Chopped, hard-cooked eggs  Add to salads.  Use for snacks and sandwich filling.  High-protein milk  To make high-protein milk, mix 1 quart whole milk with 1 cup powdered milk.  Add to cream soups, mashed potatoes, scrambled eggs, cereals and dried eggnog mix.  Use as an ingredient in puddings, custards, hot chocolate, milk shakes and pancakes.  Powdered milk  If you don't have any high-protein milk on hand, you can use powdered milk. Add 3 tablespoons to:  gravies, sauces, cream soups, mayonnaise  casseroles, meat patties, meatloaf, tuna salad, deviled ham  scalloped or mashed potatoes, creamed spinach  scrambled eggs, egg salad  cereals  yogurt, milk drinks, ice cream, frozen desserts, puddings, custards.  Add 4 to 6  tablespoons powdered milk to make:  cream sauces  breads, muffins, pancakes, waffles, cookies, cakes  cream pies, frostings, cake fillings  fruit cobblers, bread or rice pudding, gelatin desserts.  For high-protein eggnog, add 3 to 6 tablespoons powdered milk to prepared eggnog.  Hard or soft cheese  Melt on sandwiches, breads, tortillas, hamburgers, hot dogs, other meats, vegetables, eggs and pies.  Grate into soups, chili, sauces, casseroles, vegetables, potatoes, rice, noodles or meatloaf.  Eat with toast or crackers, or melt for adelso dip.  Cottage cheese or ricotta cheese  Mix with or scoop on top of fruits and vegetables.  Add to casseroles, lasagna, spaghetti, noodles and egg dishes (omelets, scrambled eggs, soufflés).  Use in gelatin, pudding-type desserts, cheesecake and pancake batter.  Use to stuff crepes, pasta shells or manicotti.  Fruit yogurt  Blend with fruits for a fruit smoothie.  Use as a dip for fruits and vegetables.  Scoop on top of pancakes or waffles.  Tofu  Blend silken tofu with fruits and juices for a smoothie.  Add chunks of firm tofu to soups and stews, or crumble into meatloaf.  Blend dried onion soup mix into soft or silken tofu for dip.  Use pureed silken tofu for part of the mayonnaise, sour cream, cream cheese or ricotta cheese called for in recipes.  Beans  Use cooked beans or peas in soups, casseroles, pasta, tacos and burritos.  Nuts and seeds  Note: For children under 3, discuss with the child's care team.  Use in casseroles, breads, muffins, pancakes, cookies and waffles.  Sprinkle on fruits, cereals, ice cream, yogurt, vegetables and salads.  Mix with raisins, dried fruits and chocolate chips for a snack.  Nut butters  Note: For children under 3, discuss with the child's care team.  Spread on sandwiches, toast, muffins, crackers, waffles, pancakes and fruit slices.  Use as a dip for raw vegetables.  Blend with milk drinks, or swirl through ice cream, yogurt or hot  "cereal.  Nutrition supplements (nutrition bars, drinks and powders)  Add powders to milk drinks and desserts.  Mix with ice cream, milk and fruit for a high-protein milk shake.    For informational purposes only. Not to replace the advice of your health care provider. Clinically reviewed by Lisa Martinez, RD, LD, and the Clinical Nutrition Service Line. Copyright   2005 [facility]. All rights reserved. [employer] 998365 - REV 04/24.      -----------------------------------------------------------------------------------------------------------------  Learning About High-Protein Foods  What foods are high in protein?     The foods you eat contain nutrients, such as vitamins and minerals. Protein is a nutrient. Your body needs the right amount to stay healthy and work as it should. You can use the list below to help you make choices about which foods to eat.  Here are some examples of foods that are high in protein.  Dairy and dairy alternatives  Cheese  Milk  Soy milk  Yogurt (especially Greek)  Meat  Beef  Chicken  Ham  Lamb  Lunch meat  Pork  Sausage  Turkey  Other protein foods  Hemp seeds!  Beans (black, garbanzo, kidney, lima)  Eggs  Hummus  Lentils  Nuts  Peanut butter and other nut butters  Peas  Soybeans  Tofu  Veggie or soy robson (Check the nutrition label for the amount of protein in each serving.)  Seafood  Anchovies  Cod  Crab  Halibut  [place]  Sardines  Shrimp  Tilapia  Abell  Tuna  Protein supplements  Bars (Check the nutrition label for the amount of protein in each serving.)  Drinks  Powders  Work with your doctor to find out how much of this nutrient you need. Depending on your health, you may need more or less of it in your diet.  Where can you learn more?  Go to https://www.healthLanx.net/patiented  Enter P335 in the search box to learn more about \"Learning About High-Protein Foods.\"  Current as of: September 20, 2023               Content Version: 14.0    [id number] " [employer], [employer].   Care instructions adapted under license by your healthcare professional. If you have questions about a medical condition or this instruction, always ask your healthcare professional. [employer] disclaims any warranty or liability for your use of this information.

## 2025-07-22 NOTE — NURSING NOTE
Current patient location: [address] [place] [id number]    Is the patient currently in the state of [place]? YES    Visit mode: TELEPHONE    If the visit is dropped, the patient can be reconnected by:TELEPHONE VISIT: Phone number:   Telephone Information:   Mobile [phone]       Will anyone else be joining the visit? NO  (If patient encounters technical issues they should call [phone]    Are changes needed to the allergy or medication list? No    Are refills needed on medications prescribed by this physician? NO    Rooming Documentation:  Questionnaire(s) not pre-assigned    Reason for visit: Medication Therapy Management    Dianne HAND

## 2025-07-22 NOTE — Clinical Note
Mireille is doing GREAT! Moving into a maintenance phase ... So will reach out to registered dietician to help with nutritional recs for this. Continuing Wegovy 0.5mg for now paying out of pocket and is affordable at this time. Will see you in October to see if any adjustments needed! Michelle

## (undated) DEVICE — SOL WATER IRRIG 1000ML BOTTLE 07139-09

## (undated) RX ORDER — FENTANYL CITRATE 50 UG/ML
INJECTION, SOLUTION INTRAMUSCULAR; INTRAVENOUS
Status: DISPENSED
Start: 2023-12-08